# Patient Record
Sex: MALE | Race: WHITE | Employment: OTHER | URBAN - METROPOLITAN AREA
[De-identification: names, ages, dates, MRNs, and addresses within clinical notes are randomized per-mention and may not be internally consistent; named-entity substitution may affect disease eponyms.]

---

## 2017-01-01 ENCOUNTER — SNF/IP PROF CHARGE ONLY (OUTPATIENT)
Dept: HEMATOLOGY/ONCOLOGY | Facility: HOSPITAL | Age: 62
End: 2017-01-01

## 2017-01-01 ENCOUNTER — OFFICE VISIT (OUTPATIENT)
Dept: HEMATOLOGY/ONCOLOGY | Facility: HOSPITAL | Age: 62
End: 2017-01-01
Attending: SPECIALIST
Payer: MEDICARE

## 2017-01-01 ENCOUNTER — SOCIAL WORK SERVICES (OUTPATIENT)
Dept: HEMATOLOGY/ONCOLOGY | Facility: HOSPITAL | Age: 62
End: 2017-01-01

## 2017-01-01 ENCOUNTER — HOSPITAL ENCOUNTER (OUTPATIENT)
Dept: MRI IMAGING | Facility: HOSPITAL | Age: 62
Discharge: HOME | End: 2017-01-01
Attending: PHYSICIAN ASSISTANT
Payer: MEDICARE

## 2017-01-01 ENCOUNTER — OFFICE VISIT (OUTPATIENT)
Dept: NEPHROLOGY | Facility: CLINIC | Age: 62
End: 2017-01-01

## 2017-01-01 ENCOUNTER — TELEPHONE (OUTPATIENT)
Dept: HEMATOLOGY/ONCOLOGY | Facility: HOSPITAL | Age: 62
End: 2017-01-01

## 2017-01-01 ENCOUNTER — OFFICE VISIT (OUTPATIENT)
Dept: FAMILY MEDICINE CLINIC | Facility: CLINIC | Age: 62
End: 2017-01-01

## 2017-01-01 ENCOUNTER — APPOINTMENT (OUTPATIENT)
Dept: CT IMAGING | Facility: HOSPITAL | Age: 62
DRG: 870 | End: 2017-01-01
Attending: INTERNAL MEDICINE
Payer: MEDICARE

## 2017-01-01 ENCOUNTER — MED REC SCAN ONLY (OUTPATIENT)
Dept: FAMILY MEDICINE CLINIC | Facility: CLINIC | Age: 62
End: 2017-01-01

## 2017-01-01 ENCOUNTER — APPOINTMENT (OUTPATIENT)
Dept: LAB | Facility: HOSPITAL | Age: 62
End: 2017-01-01
Attending: SPECIALIST
Payer: MEDICARE

## 2017-01-01 ENCOUNTER — TELEPHONE (OUTPATIENT)
Dept: NEPHROLOGY | Facility: CLINIC | Age: 62
End: 2017-01-01

## 2017-01-01 ENCOUNTER — OFFICE VISIT (OUTPATIENT)
Dept: SURGERY | Facility: CLINIC | Age: 62
End: 2017-01-01

## 2017-01-01 ENCOUNTER — TELEPHONE (OUTPATIENT)
Dept: FAMILY MEDICINE CLINIC | Facility: CLINIC | Age: 62
End: 2017-01-01

## 2017-01-01 ENCOUNTER — APPOINTMENT (OUTPATIENT)
Dept: CV DIAGNOSTICS | Facility: HOSPITAL | Age: 62
DRG: 870 | End: 2017-01-01
Attending: INTERNAL MEDICINE
Payer: MEDICARE

## 2017-01-01 ENCOUNTER — HOSPITAL ENCOUNTER (OUTPATIENT)
Dept: GENERAL RADIOLOGY | Age: 62
Discharge: HOME | End: 2017-01-01
Attending: SPECIALIST
Payer: MEDICARE

## 2017-01-01 ENCOUNTER — HOSPITAL ENCOUNTER (INPATIENT)
Facility: HOSPITAL | Age: 62
LOS: 16 days | Discharge: HOME HEALTH | DRG: 870 | End: 2018-01-01
Attending: INTERNAL MEDICINE | Admitting: INTERNAL MEDICINE
Payer: MEDICARE

## 2017-01-01 ENCOUNTER — APPOINTMENT (OUTPATIENT)
Dept: GENERAL RADIOLOGY | Facility: HOSPITAL | Age: 62
DRG: 870 | End: 2017-01-01
Attending: INTERNAL MEDICINE
Payer: MEDICARE

## 2017-01-01 VITALS
BODY MASS INDEX: 22.48 KG/M2 | OXYGEN SATURATION: 98 % | WEIGHT: 151.81 LBS | DIASTOLIC BLOOD PRESSURE: 60 MMHG | HEART RATE: 89 BPM | TEMPERATURE: 98 F | RESPIRATION RATE: 18 BRPM | SYSTOLIC BLOOD PRESSURE: 106 MMHG | HEIGHT: 69.09 IN

## 2017-01-01 VITALS
WEIGHT: 175.63 LBS | DIASTOLIC BLOOD PRESSURE: 86 MMHG | BODY MASS INDEX: 26.62 KG/M2 | RESPIRATION RATE: 20 BRPM | HEART RATE: 83 BPM | SYSTOLIC BLOOD PRESSURE: 125 MMHG | HEIGHT: 68.27 IN | OXYGEN SATURATION: 95 % | TEMPERATURE: 97 F

## 2017-01-01 VITALS
WEIGHT: 161.81 LBS | HEIGHT: 68.27 IN | BODY MASS INDEX: 24.1 KG/M2 | SYSTOLIC BLOOD PRESSURE: 131 MMHG | HEIGHT: 68.27 IN | TEMPERATURE: 98 F | HEART RATE: 59 BPM | RESPIRATION RATE: 18 BRPM | BODY MASS INDEX: 24.52 KG/M2 | OXYGEN SATURATION: 98 % | TEMPERATURE: 99 F | DIASTOLIC BLOOD PRESSURE: 77 MMHG | OXYGEN SATURATION: 99 % | HEART RATE: 81 BPM | SYSTOLIC BLOOD PRESSURE: 129 MMHG | DIASTOLIC BLOOD PRESSURE: 77 MMHG | RESPIRATION RATE: 18 BRPM | WEIGHT: 159 LBS

## 2017-01-01 VITALS
BODY MASS INDEX: 23.85 KG/M2 | OXYGEN SATURATION: 99 % | HEART RATE: 99 BPM | TEMPERATURE: 97 F | SYSTOLIC BLOOD PRESSURE: 98 MMHG | HEIGHT: 68.27 IN | RESPIRATION RATE: 18 BRPM | DIASTOLIC BLOOD PRESSURE: 65 MMHG | WEIGHT: 157.38 LBS

## 2017-01-01 VITALS
WEIGHT: 149 LBS | HEART RATE: 90 BPM | TEMPERATURE: 98 F | OXYGEN SATURATION: 97 % | DIASTOLIC BLOOD PRESSURE: 60 MMHG | HEIGHT: 69.09 IN | RESPIRATION RATE: 18 BRPM | BODY MASS INDEX: 22.07 KG/M2 | SYSTOLIC BLOOD PRESSURE: 107 MMHG

## 2017-01-01 VITALS
DIASTOLIC BLOOD PRESSURE: 42 MMHG | RESPIRATION RATE: 18 BRPM | SYSTOLIC BLOOD PRESSURE: 116 MMHG | HEART RATE: 74 BPM | BODY MASS INDEX: 24.34 KG/M2 | HEIGHT: 68.27 IN | WEIGHT: 160.63 LBS | TEMPERATURE: 97 F

## 2017-01-01 VITALS
OXYGEN SATURATION: 98 % | BODY MASS INDEX: 22.16 KG/M2 | TEMPERATURE: 96 F | HEIGHT: 69.09 IN | WEIGHT: 149.63 LBS | SYSTOLIC BLOOD PRESSURE: 119 MMHG | DIASTOLIC BLOOD PRESSURE: 75 MMHG | RESPIRATION RATE: 18 BRPM | HEART RATE: 71 BPM

## 2017-01-01 VITALS
BODY MASS INDEX: 26.16 KG/M2 | TEMPERATURE: 97 F | SYSTOLIC BLOOD PRESSURE: 129 MMHG | WEIGHT: 158.38 LBS | HEART RATE: 92 BPM | HEART RATE: 78 BPM | DIASTOLIC BLOOD PRESSURE: 72 MMHG | OXYGEN SATURATION: 99 % | WEIGHT: 172.63 LBS | DIASTOLIC BLOOD PRESSURE: 79 MMHG | BODY MASS INDEX: 24 KG/M2 | RESPIRATION RATE: 18 BRPM | TEMPERATURE: 98 F | HEIGHT: 68.27 IN | SYSTOLIC BLOOD PRESSURE: 133 MMHG | RESPIRATION RATE: 18 BRPM | OXYGEN SATURATION: 98 % | HEIGHT: 68.27 IN

## 2017-01-01 VITALS
BODY MASS INDEX: 22.13 KG/M2 | HEIGHT: 69.09 IN | TEMPERATURE: 97 F | HEART RATE: 58 BPM | WEIGHT: 149.38 LBS | OXYGEN SATURATION: 98 % | RESPIRATION RATE: 16 BRPM | SYSTOLIC BLOOD PRESSURE: 112 MMHG | DIASTOLIC BLOOD PRESSURE: 75 MMHG

## 2017-01-01 VITALS
SYSTOLIC BLOOD PRESSURE: 119 MMHG | HEART RATE: 94 BPM | TEMPERATURE: 98 F | WEIGHT: 159.63 LBS | BODY MASS INDEX: 24 KG/M2 | DIASTOLIC BLOOD PRESSURE: 68 MMHG | OXYGEN SATURATION: 97 % | RESPIRATION RATE: 18 BRPM

## 2017-01-01 VITALS
OXYGEN SATURATION: 100 % | DIASTOLIC BLOOD PRESSURE: 54 MMHG | RESPIRATION RATE: 18 BRPM | SYSTOLIC BLOOD PRESSURE: 107 MMHG | TEMPERATURE: 98 F | HEART RATE: 98 BPM

## 2017-01-01 VITALS
DIASTOLIC BLOOD PRESSURE: 76 MMHG | SYSTOLIC BLOOD PRESSURE: 116 MMHG | RESPIRATION RATE: 18 BRPM | TEMPERATURE: 98 F | OXYGEN SATURATION: 100 % | HEIGHT: 68.27 IN | WEIGHT: 165.5 LBS | HEART RATE: 99 BPM | BODY MASS INDEX: 25.08 KG/M2

## 2017-01-01 VITALS
SYSTOLIC BLOOD PRESSURE: 117 MMHG | OXYGEN SATURATION: 98 % | DIASTOLIC BLOOD PRESSURE: 72 MMHG | BODY MASS INDEX: 25.1 KG/M2 | RESPIRATION RATE: 18 BRPM | HEART RATE: 96 BPM | TEMPERATURE: 98 F | HEIGHT: 68.27 IN | WEIGHT: 165.63 LBS

## 2017-01-01 VITALS
WEIGHT: 149 LBS | HEART RATE: 98 BPM | SYSTOLIC BLOOD PRESSURE: 131 MMHG | DIASTOLIC BLOOD PRESSURE: 67 MMHG | WEIGHT: 159 LBS | TEMPERATURE: 98 F | HEIGHT: 69.09 IN | BODY MASS INDEX: 23.55 KG/M2 | HEIGHT: 69.09 IN | OXYGEN SATURATION: 98 % | SYSTOLIC BLOOD PRESSURE: 107 MMHG | RESPIRATION RATE: 18 BRPM | RESPIRATION RATE: 18 BRPM | HEART RATE: 90 BPM | TEMPERATURE: 98 F | BODY MASS INDEX: 22.07 KG/M2 | OXYGEN SATURATION: 97 % | DIASTOLIC BLOOD PRESSURE: 60 MMHG

## 2017-01-01 VITALS
HEART RATE: 98 BPM | OXYGEN SATURATION: 96 % | RESPIRATION RATE: 18 BRPM | TEMPERATURE: 97 F | HEIGHT: 69.09 IN | DIASTOLIC BLOOD PRESSURE: 62 MMHG | SYSTOLIC BLOOD PRESSURE: 121 MMHG | BODY MASS INDEX: 23.47 KG/M2 | WEIGHT: 158.5 LBS

## 2017-01-01 VITALS
HEART RATE: 107 BPM | OXYGEN SATURATION: 99 % | SYSTOLIC BLOOD PRESSURE: 129 MMHG | HEIGHT: 69.09 IN | DIASTOLIC BLOOD PRESSURE: 75 MMHG | RESPIRATION RATE: 18 BRPM | BODY MASS INDEX: 22.81 KG/M2 | TEMPERATURE: 98 F | WEIGHT: 154 LBS

## 2017-01-01 VITALS
DIASTOLIC BLOOD PRESSURE: 76 MMHG | HEIGHT: 69.09 IN | WEIGHT: 145.38 LBS | TEMPERATURE: 96 F | RESPIRATION RATE: 18 BRPM | BODY MASS INDEX: 21.53 KG/M2 | OXYGEN SATURATION: 98 % | HEART RATE: 82 BPM | SYSTOLIC BLOOD PRESSURE: 134 MMHG

## 2017-01-01 VITALS
DIASTOLIC BLOOD PRESSURE: 74 MMHG | HEART RATE: 80 BPM | TEMPERATURE: 98 F | RESPIRATION RATE: 18 BRPM | SYSTOLIC BLOOD PRESSURE: 109 MMHG

## 2017-01-01 VITALS — WEIGHT: 148 LBS | SYSTOLIC BLOOD PRESSURE: 122 MMHG | BODY MASS INDEX: 22 KG/M2 | DIASTOLIC BLOOD PRESSURE: 74 MMHG

## 2017-01-01 VITALS
WEIGHT: 169 LBS | DIASTOLIC BLOOD PRESSURE: 80 MMHG | HEART RATE: 99 BPM | HEIGHT: 68.27 IN | RESPIRATION RATE: 18 BRPM | BODY MASS INDEX: 25.61 KG/M2 | SYSTOLIC BLOOD PRESSURE: 128 MMHG | TEMPERATURE: 98 F | OXYGEN SATURATION: 97 %

## 2017-01-01 VITALS
RESPIRATION RATE: 18 BRPM | DIASTOLIC BLOOD PRESSURE: 64 MMHG | OXYGEN SATURATION: 100 % | BODY MASS INDEX: 24.25 KG/M2 | WEIGHT: 160 LBS | SYSTOLIC BLOOD PRESSURE: 114 MMHG | TEMPERATURE: 97 F | HEART RATE: 92 BPM | HEIGHT: 68.27 IN

## 2017-01-01 VITALS
WEIGHT: 149.38 LBS | OXYGEN SATURATION: 98 % | RESPIRATION RATE: 20 BRPM | BODY MASS INDEX: 22 KG/M2 | SYSTOLIC BLOOD PRESSURE: 122 MMHG | TEMPERATURE: 97 F | HEART RATE: 76 BPM | DIASTOLIC BLOOD PRESSURE: 55 MMHG

## 2017-01-01 VITALS
RESPIRATION RATE: 20 BRPM | WEIGHT: 173.19 LBS | OXYGEN SATURATION: 99 % | TEMPERATURE: 98 F | DIASTOLIC BLOOD PRESSURE: 77 MMHG | HEART RATE: 97 BPM | SYSTOLIC BLOOD PRESSURE: 127 MMHG | BODY MASS INDEX: 26.25 KG/M2 | HEIGHT: 68.27 IN

## 2017-01-01 VITALS
WEIGHT: 170 LBS | HEART RATE: 101 BPM | DIASTOLIC BLOOD PRESSURE: 75 MMHG | HEIGHT: 68.27 IN | RESPIRATION RATE: 18 BRPM | SYSTOLIC BLOOD PRESSURE: 120 MMHG | OXYGEN SATURATION: 98 % | TEMPERATURE: 98 F | BODY MASS INDEX: 25.76 KG/M2

## 2017-01-01 VITALS
BODY MASS INDEX: 23.6 KG/M2 | HEART RATE: 80 BPM | RESPIRATION RATE: 18 BRPM | SYSTOLIC BLOOD PRESSURE: 129 MMHG | HEIGHT: 69.09 IN | SYSTOLIC BLOOD PRESSURE: 123 MMHG | WEIGHT: 159.38 LBS | WEIGHT: 170.38 LBS | TEMPERATURE: 98 F | DIASTOLIC BLOOD PRESSURE: 50 MMHG | OXYGEN SATURATION: 98 % | OXYGEN SATURATION: 98 % | BODY MASS INDEX: 25.82 KG/M2 | HEIGHT: 68.27 IN | RESPIRATION RATE: 18 BRPM | DIASTOLIC BLOOD PRESSURE: 83 MMHG | TEMPERATURE: 98 F | HEART RATE: 101 BPM

## 2017-01-01 VITALS
OXYGEN SATURATION: 99 % | TEMPERATURE: 97 F | SYSTOLIC BLOOD PRESSURE: 148 MMHG | DIASTOLIC BLOOD PRESSURE: 75 MMHG | HEART RATE: 63 BPM | RESPIRATION RATE: 18 BRPM

## 2017-01-01 VITALS
DIASTOLIC BLOOD PRESSURE: 75 MMHG | WEIGHT: 159.19 LBS | HEART RATE: 85 BPM | SYSTOLIC BLOOD PRESSURE: 125 MMHG | BODY MASS INDEX: 24.13 KG/M2 | TEMPERATURE: 98 F | OXYGEN SATURATION: 97 % | HEIGHT: 68.27 IN | RESPIRATION RATE: 18 BRPM

## 2017-01-01 VITALS
DIASTOLIC BLOOD PRESSURE: 78 MMHG | TEMPERATURE: 98 F | BODY MASS INDEX: 21.89 KG/M2 | OXYGEN SATURATION: 99 % | HEART RATE: 84 BPM | WEIGHT: 147.81 LBS | HEIGHT: 69.09 IN | RESPIRATION RATE: 18 BRPM | SYSTOLIC BLOOD PRESSURE: 131 MMHG

## 2017-01-01 VITALS
RESPIRATION RATE: 18 BRPM | TEMPERATURE: 98 F | DIASTOLIC BLOOD PRESSURE: 69 MMHG | BODY MASS INDEX: 24.38 KG/M2 | WEIGHT: 164.63 LBS | HEART RATE: 88 BPM | SYSTOLIC BLOOD PRESSURE: 120 MMHG | OXYGEN SATURATION: 100 % | HEIGHT: 69.09 IN

## 2017-01-01 VITALS
TEMPERATURE: 96 F | RESPIRATION RATE: 18 BRPM | WEIGHT: 171 LBS | DIASTOLIC BLOOD PRESSURE: 71 MMHG | SYSTOLIC BLOOD PRESSURE: 125 MMHG | BODY MASS INDEX: 25.91 KG/M2 | HEIGHT: 68.27 IN | HEART RATE: 77 BPM

## 2017-01-01 VITALS
TEMPERATURE: 98 F | OXYGEN SATURATION: 99 % | BODY MASS INDEX: 24.02 KG/M2 | RESPIRATION RATE: 18 BRPM | SYSTOLIC BLOOD PRESSURE: 112 MMHG | DIASTOLIC BLOOD PRESSURE: 69 MMHG | HEIGHT: 68.27 IN | HEART RATE: 80 BPM | WEIGHT: 158.5 LBS

## 2017-01-01 VITALS
HEIGHT: 69.09 IN | WEIGHT: 160.19 LBS | BODY MASS INDEX: 23.73 KG/M2 | HEART RATE: 88 BPM | SYSTOLIC BLOOD PRESSURE: 113 MMHG | OXYGEN SATURATION: 98 % | TEMPERATURE: 98 F | DIASTOLIC BLOOD PRESSURE: 67 MMHG | RESPIRATION RATE: 18 BRPM

## 2017-01-01 VITALS
HEART RATE: 102 BPM | WEIGHT: 154 LBS | DIASTOLIC BLOOD PRESSURE: 68 MMHG | RESPIRATION RATE: 18 BRPM | HEIGHT: 69.09 IN | OXYGEN SATURATION: 98 % | SYSTOLIC BLOOD PRESSURE: 122 MMHG | BODY MASS INDEX: 22.81 KG/M2 | TEMPERATURE: 99 F

## 2017-01-01 VITALS
HEIGHT: 69.09 IN | OXYGEN SATURATION: 98 % | DIASTOLIC BLOOD PRESSURE: 62 MMHG | SYSTOLIC BLOOD PRESSURE: 108 MMHG | RESPIRATION RATE: 18 BRPM | HEART RATE: 105 BPM | WEIGHT: 146 LBS | BODY MASS INDEX: 21.62 KG/M2 | TEMPERATURE: 97 F

## 2017-01-01 VITALS
HEIGHT: 67 IN | SYSTOLIC BLOOD PRESSURE: 110 MMHG | HEART RATE: 79 BPM | OXYGEN SATURATION: 97 % | DIASTOLIC BLOOD PRESSURE: 62 MMHG | WEIGHT: 158.13 LBS | TEMPERATURE: 98 F | BODY MASS INDEX: 24.82 KG/M2

## 2017-01-01 VITALS
OXYGEN SATURATION: 99 % | DIASTOLIC BLOOD PRESSURE: 60 MMHG | WEIGHT: 151.25 LBS | TEMPERATURE: 99 F | HEART RATE: 101 BPM | BODY MASS INDEX: 22.92 KG/M2 | SYSTOLIC BLOOD PRESSURE: 110 MMHG | HEIGHT: 68 IN

## 2017-01-01 VITALS
RESPIRATION RATE: 19 BRPM | BODY MASS INDEX: 24.95 KG/M2 | HEART RATE: 88 BPM | WEIGHT: 164.63 LBS | TEMPERATURE: 97 F | DIASTOLIC BLOOD PRESSURE: 60 MMHG | SYSTOLIC BLOOD PRESSURE: 106 MMHG | HEIGHT: 68.27 IN

## 2017-01-01 VITALS
TEMPERATURE: 99 F | DIASTOLIC BLOOD PRESSURE: 73 MMHG | RESPIRATION RATE: 18 BRPM | HEART RATE: 101 BPM | BODY MASS INDEX: 23.99 KG/M2 | SYSTOLIC BLOOD PRESSURE: 135 MMHG | WEIGHT: 162 LBS | HEIGHT: 69 IN

## 2017-01-01 VITALS
HEIGHT: 68.27 IN | SYSTOLIC BLOOD PRESSURE: 117 MMHG | OXYGEN SATURATION: 98 % | HEART RATE: 68 BPM | TEMPERATURE: 99 F | WEIGHT: 160.38 LBS | BODY MASS INDEX: 24.31 KG/M2 | RESPIRATION RATE: 18 BRPM | DIASTOLIC BLOOD PRESSURE: 65 MMHG

## 2017-01-01 VITALS
TEMPERATURE: 97 F | WEIGHT: 157.81 LBS | BODY MASS INDEX: 23.37 KG/M2 | SYSTOLIC BLOOD PRESSURE: 112 MMHG | OXYGEN SATURATION: 99 % | HEART RATE: 90 BPM | HEIGHT: 69.09 IN | RESPIRATION RATE: 18 BRPM | DIASTOLIC BLOOD PRESSURE: 70 MMHG

## 2017-01-01 VITALS
BODY MASS INDEX: 22.54 KG/M2 | WEIGHT: 152.19 LBS | HEART RATE: 77 BPM | SYSTOLIC BLOOD PRESSURE: 120 MMHG | OXYGEN SATURATION: 98 % | RESPIRATION RATE: 18 BRPM | TEMPERATURE: 97 F | HEIGHT: 69.09 IN | DIASTOLIC BLOOD PRESSURE: 71 MMHG

## 2017-01-01 VITALS
BODY MASS INDEX: 24 KG/M2 | SYSTOLIC BLOOD PRESSURE: 128 MMHG | WEIGHT: 159.63 LBS | RESPIRATION RATE: 18 BRPM | DIASTOLIC BLOOD PRESSURE: 72 MMHG

## 2017-01-01 VITALS
WEIGHT: 159.63 LBS | BODY MASS INDEX: 24.19 KG/M2 | SYSTOLIC BLOOD PRESSURE: 140 MMHG | HEIGHT: 68.27 IN | DIASTOLIC BLOOD PRESSURE: 85 MMHG | TEMPERATURE: 99 F | HEART RATE: 88 BPM | OXYGEN SATURATION: 98 % | RESPIRATION RATE: 18 BRPM

## 2017-01-01 VITALS
HEART RATE: 84 BPM | RESPIRATION RATE: 18 BRPM | SYSTOLIC BLOOD PRESSURE: 114 MMHG | OXYGEN SATURATION: 98 % | TEMPERATURE: 97 F | DIASTOLIC BLOOD PRESSURE: 67 MMHG | HEIGHT: 69.09 IN | BODY MASS INDEX: 23.99 KG/M2 | WEIGHT: 162 LBS

## 2017-01-01 VITALS
DIASTOLIC BLOOD PRESSURE: 70 MMHG | BODY MASS INDEX: 23.18 KG/M2 | SYSTOLIC BLOOD PRESSURE: 122 MMHG | HEART RATE: 96 BPM | OXYGEN SATURATION: 98 % | HEIGHT: 69.09 IN | WEIGHT: 156.5 LBS | RESPIRATION RATE: 18 BRPM | TEMPERATURE: 98 F

## 2017-01-01 VITALS
HEART RATE: 85 BPM | RESPIRATION RATE: 18 BRPM | TEMPERATURE: 97 F | WEIGHT: 156 LBS | OXYGEN SATURATION: 99 % | DIASTOLIC BLOOD PRESSURE: 85 MMHG | SYSTOLIC BLOOD PRESSURE: 120 MMHG | BODY MASS INDEX: 23.11 KG/M2 | HEIGHT: 69.09 IN

## 2017-01-01 VITALS — WEIGHT: 154 LBS | BODY MASS INDEX: 23 KG/M2 | DIASTOLIC BLOOD PRESSURE: 70 MMHG | SYSTOLIC BLOOD PRESSURE: 122 MMHG

## 2017-01-01 VITALS — HEIGHT: 69.09 IN | BODY MASS INDEX: 23.64 KG/M2 | WEIGHT: 159.63 LBS

## 2017-01-01 VITALS
WEIGHT: 162.81 LBS | DIASTOLIC BLOOD PRESSURE: 61 MMHG | OXYGEN SATURATION: 97 % | SYSTOLIC BLOOD PRESSURE: 100 MMHG | HEART RATE: 93 BPM | BODY MASS INDEX: 24.68 KG/M2 | TEMPERATURE: 98 F | RESPIRATION RATE: 18 BRPM | HEIGHT: 68.27 IN

## 2017-01-01 VITALS
HEART RATE: 89 BPM | RESPIRATION RATE: 18 BRPM | TEMPERATURE: 97 F | SYSTOLIC BLOOD PRESSURE: 138 MMHG | DIASTOLIC BLOOD PRESSURE: 79 MMHG

## 2017-01-01 DIAGNOSIS — N18.4 CKD (CHRONIC KIDNEY DISEASE) STAGE 4, GFR 15-29 ML/MIN (HCC): ICD-10-CM

## 2017-01-01 DIAGNOSIS — Z87.81 HISTORY OF COMPRESSION FRACTURE OF SPINE: Primary | ICD-10-CM

## 2017-01-01 DIAGNOSIS — D63.1 ANEMIA IN STAGE 4 CHRONIC KIDNEY DISEASE (HCC): ICD-10-CM

## 2017-01-01 DIAGNOSIS — C90.00 STAGE III MULTIPLE MYELOMA (HCC): ICD-10-CM

## 2017-01-01 DIAGNOSIS — D89.89 LIGHT CHAIN DISEASE, LAMBDA TYPE (HCC): Primary | ICD-10-CM

## 2017-01-01 DIAGNOSIS — N17.0 ACUTE RENAL FAILURE WITH ACUTE TUBULAR NECROSIS SUPERIMPOSED ON STAGE 4 CHRONIC KIDNEY DISEASE (HCC): ICD-10-CM

## 2017-01-01 DIAGNOSIS — D89.89 LIGHT CHAIN DISEASE, LAMBDA TYPE (HCC): ICD-10-CM

## 2017-01-01 DIAGNOSIS — C90.02 MULTIPLE MYELOMA IN RELAPSE (HCC): Primary | ICD-10-CM

## 2017-01-01 DIAGNOSIS — B18.1 CHRONIC VIRAL HEPATITIS B WITHOUT DELTA AGENT AND WITHOUT COMA (HCC): ICD-10-CM

## 2017-01-01 DIAGNOSIS — K21.9 GERD WITHOUT ESOPHAGITIS: ICD-10-CM

## 2017-01-01 DIAGNOSIS — N18.4 ANEMIA IN STAGE 4 CHRONIC KIDNEY DISEASE (HCC): Primary | ICD-10-CM

## 2017-01-01 DIAGNOSIS — E79.0 HYPERURICEMIA: ICD-10-CM

## 2017-01-01 DIAGNOSIS — N18.4 STAGE 4 CHRONIC KIDNEY DISEASE (HCC): Primary | ICD-10-CM

## 2017-01-01 DIAGNOSIS — R53.83 FATIGUE DUE TO TREATMENT: ICD-10-CM

## 2017-01-01 DIAGNOSIS — N08 MYELOMA KIDNEY (HCC): ICD-10-CM

## 2017-01-01 DIAGNOSIS — D63.1 ANEMIA IN STAGE 3 CHRONIC KIDNEY DISEASE (HCC): ICD-10-CM

## 2017-01-01 DIAGNOSIS — Z00.00 ENCOUNTER FOR ANNUAL HEALTH EXAMINATION: ICD-10-CM

## 2017-01-01 DIAGNOSIS — D64.9 NORMOCYTIC NORMOCHROMIC ANEMIA: ICD-10-CM

## 2017-01-01 DIAGNOSIS — C90.02 MULTIPLE MYELOMA IN RELAPSE (HCC): ICD-10-CM

## 2017-01-01 DIAGNOSIS — N18.30 ANEMIA IN STAGE 3 CHRONIC KIDNEY DISEASE (HCC): ICD-10-CM

## 2017-01-01 DIAGNOSIS — B19.10 REACTIVATION OF HEPATITIS B VIRAL HEPATITIS: ICD-10-CM

## 2017-01-01 DIAGNOSIS — C90.00 MYELOMA KIDNEY (HCC): ICD-10-CM

## 2017-01-01 DIAGNOSIS — N18.4 ANEMIA IN STAGE 4 CHRONIC KIDNEY DISEASE (HCC): ICD-10-CM

## 2017-01-01 DIAGNOSIS — N18.4 CKD (CHRONIC KIDNEY DISEASE) STAGE 4, GFR 15-29 ML/MIN (HCC): Primary | ICD-10-CM

## 2017-01-01 DIAGNOSIS — N18.30 CHRONIC RENAL IMPAIRMENT, STAGE 3 (MODERATE) (HCC): ICD-10-CM

## 2017-01-01 DIAGNOSIS — M89.9 DISORDER OF BONE: ICD-10-CM

## 2017-01-01 DIAGNOSIS — N08 MYELOMA KIDNEY (HCC): Primary | ICD-10-CM

## 2017-01-01 DIAGNOSIS — Z87.81 HISTORY OF COMPRESSION FRACTURE OF SPINE: ICD-10-CM

## 2017-01-01 DIAGNOSIS — D69.6 THROMBOCYTOPENIA (HCC): ICD-10-CM

## 2017-01-01 DIAGNOSIS — N18.30 CHRONIC RENAL INSUFFICIENCY, STAGE 3 (MODERATE) (HCC): Primary | ICD-10-CM

## 2017-01-01 DIAGNOSIS — N18.30 CHRONIC RENAL INSUFFICIENCY, STAGE 3 (MODERATE) (HCC): ICD-10-CM

## 2017-01-01 DIAGNOSIS — R63.4 WEIGHT LOSS: ICD-10-CM

## 2017-01-01 DIAGNOSIS — N18.4 ACUTE RENAL FAILURE WITH ACUTE TUBULAR NECROSIS SUPERIMPOSED ON STAGE 4 CHRONIC KIDNEY DISEASE (HCC): ICD-10-CM

## 2017-01-01 DIAGNOSIS — D63.1 ANEMIA IN STAGE 4 CHRONIC KIDNEY DISEASE (HCC): Primary | ICD-10-CM

## 2017-01-01 DIAGNOSIS — C90.00 MYELOMA KIDNEY (HCC): Primary | ICD-10-CM

## 2017-01-01 DIAGNOSIS — Z71.89 OTHER SPECIFIED COUNSELING: Primary | ICD-10-CM

## 2017-01-01 DIAGNOSIS — C90.00 STAGE III MULTIPLE MYELOMA (HCC): Primary | ICD-10-CM

## 2017-01-01 DIAGNOSIS — H25.011 CORTICAL AGE-RELATED CATARACT OF RIGHT EYE: ICD-10-CM

## 2017-01-01 DIAGNOSIS — N18.4 STAGE 4 CHRONIC KIDNEY DISEASE (HCC): ICD-10-CM

## 2017-01-01 DIAGNOSIS — R63.4 WEIGHT LOSS: Primary | ICD-10-CM

## 2017-01-01 DIAGNOSIS — B18.1 CHRONIC VIRAL HEPATITIS B WITHOUT DELTA AGENT AND WITHOUT COMA (HCC): Primary | ICD-10-CM

## 2017-01-01 DIAGNOSIS — Z94.84 HX OF AUTOLOGOUS STEM CELL TRANSPLANT (HCC): ICD-10-CM

## 2017-01-01 DIAGNOSIS — N18.30 ACUTE RENAL FAILURE WITH OTHER SPECIFIED PATHOLOGICAL KIDNEY LESION SUPERIMPOSED ON STAGE 3 CHRONIC KIDNEY DISEASE: ICD-10-CM

## 2017-01-01 DIAGNOSIS — N17.8 ACUTE RENAL FAILURE WITH OTHER SPECIFIED PATHOLOGICAL KIDNEY LESION SUPERIMPOSED ON STAGE 3 CHRONIC KIDNEY DISEASE: ICD-10-CM

## 2017-01-01 DIAGNOSIS — Z01.818 PRE-OP EVALUATION: Primary | ICD-10-CM

## 2017-01-01 DIAGNOSIS — B19.10 HEP B W/O COMA: Primary | ICD-10-CM

## 2017-01-01 DIAGNOSIS — F32.89 OTHER DEPRESSION: ICD-10-CM

## 2017-01-01 DIAGNOSIS — B19.10 REACTIVATION OF HEPATITIS B VIRAL HEPATITIS: Primary | ICD-10-CM

## 2017-01-01 DIAGNOSIS — R79.89 ELEVATED LFTS: ICD-10-CM

## 2017-01-01 DIAGNOSIS — D64.9 ANEMIA, UNSPECIFIED TYPE: ICD-10-CM

## 2017-01-01 DIAGNOSIS — D53.9 MACROCYTIC ANEMIA: ICD-10-CM

## 2017-01-01 DIAGNOSIS — H65.01 RIGHT ACUTE SEROUS OTITIS MEDIA, RECURRENCE NOT SPECIFIED: ICD-10-CM

## 2017-01-01 DIAGNOSIS — M89.9 DISORDER OF BONE: Primary | ICD-10-CM

## 2017-01-01 LAB
A/G RATIO: 1.42
A/G RATIO: 1.43
ADENOVIRUS PCR:: NEGATIVE
ALBUMIN SERPL-MCNC: 1.8 G/DL (ref 3.5–4.8)
ALBUMIN SERPL-MCNC: 2.1 G/DL (ref 3.5–4.8)
ALBUMIN SERPL-MCNC: 3 G/DL (ref 3.5–4.8)
ALBUMIN SERPL-MCNC: 3.1 G/DL (ref 3.5–4.8)
ALBUMIN SERPL-MCNC: 3.1 G/DL (ref 3.5–4.8)
ALBUMIN SERPL-MCNC: 3.3 G/DL (ref 3.5–4.8)
ALBUMIN SERPL-MCNC: 3.3 G/DL (ref 3.5–4.8)
ALBUMIN, SERUM: 3.7 G/DL (ref 3.5–4.8)
ALBUMIN, SERUM: 3.94 G/DL (ref 3.5–4.8)
ALLENS TEST: POSITIVE
ALP LIVER SERPL-CCNC: 102 U/L (ref 45–117)
ALP LIVER SERPL-CCNC: 77 U/L (ref 45–117)
ALP LIVER SERPL-CCNC: 83 U/L (ref 45–117)
ALP LIVER SERPL-CCNC: 84 U/L (ref 45–117)
ALP LIVER SERPL-CCNC: 85 U/L (ref 45–117)
ALP LIVER SERPL-CCNC: 87 U/L (ref 45–117)
ALP LIVER SERPL-CCNC: 88 U/L (ref 45–117)
ALP LIVER SERPL-CCNC: 92 U/L (ref 45–117)
ALP LIVER SERPL-CCNC: 97 U/L (ref 45–117)
ALP LIVER SERPL-CCNC: 99 U/L (ref 45–117)
ALPHA-1 GLOBULIN: 0.23 G/DL (ref 0.1–0.3)
ALPHA-1 GLOBULIN: 0.26 G/DL (ref 0.1–0.3)
ALPHA-2 GLOBULIN: 1.27 G/DL (ref 0.6–1)
ALPHA-2 GLOBULIN: 1.61 G/DL (ref 0.6–1)
ALT SERPL-CCNC: 20 U/L (ref 17–63)
ALT SERPL-CCNC: 22 U/L (ref 17–63)
ALT SERPL-CCNC: 27 U/L (ref 17–63)
ALT SERPL-CCNC: 28 U/L (ref 17–63)
ALT SERPL-CCNC: 32 U/L (ref 17–63)
ALT SERPL-CCNC: 33 U/L (ref 17–63)
ALT SERPL-CCNC: 35 U/L (ref 17–63)
ALT SERPL-CCNC: 36 U/L (ref 17–63)
ALT SERPL-CCNC: 37 U/L (ref 17–63)
ALT SERPL-CCNC: 48 U/L (ref 17–63)
ANTIBODY SCREEN: NEGATIVE
ANTIBODY SCREEN: POSITIVE
APTT PPP: 34.1 SECONDS (ref 25–34)
ARTERIAL BLD GAS O2 SATURATION: 93 % (ref 92–100)
ARTERIAL BLD GAS O2 SATURATION: 94 % (ref 92–100)
ARTERIAL BLD GAS O2 SATURATION: 97 % (ref 92–100)
ARTERIAL BLOOD GAS BASE EXCESS: -10.7
ARTERIAL BLOOD GAS BASE EXCESS: -11.8
ARTERIAL BLOOD GAS BASE EXCESS: -12.3
ARTERIAL BLOOD GAS BASE EXCESS: -12.7
ARTERIAL BLOOD GAS BASE EXCESS: -13.1
ARTERIAL BLOOD GAS HCO3: 14.2 MEQ/L (ref 22–26)
ARTERIAL BLOOD GAS HCO3: 14.3 MEQ/L (ref 22–26)
ARTERIAL BLOOD GAS HCO3: 15.5 MEQ/L (ref 22–26)
ARTERIAL BLOOD GAS HCO3: 16.3 MEQ/L (ref 22–26)
ARTERIAL BLOOD GAS HCO3: 17.3 MEQ/L (ref 22–26)
ARTERIAL BLOOD GAS PCO2: 28 MM HG (ref 35–45)
ARTERIAL BLOOD GAS PCO2: 35 MM HG (ref 35–45)
ARTERIAL BLOOD GAS PCO2: 47 MM HG (ref 35–45)
ARTERIAL BLOOD GAS PCO2: 49 MM HG (ref 35–45)
ARTERIAL BLOOD GAS PCO2: 54 MM HG (ref 35–45)
ARTERIAL BLOOD GAS PH: 7.12 (ref 7.35–7.45)
ARTERIAL BLOOD GAS PH: 7.13 (ref 7.35–7.45)
ARTERIAL BLOOD GAS PH: 7.13 (ref 7.35–7.45)
ARTERIAL BLOOD GAS PH: 7.22 (ref 7.35–7.45)
ARTERIAL BLOOD GAS PH: 7.32 (ref 7.35–7.45)
ARTERIAL BLOOD GAS PO2: 177 MM HG (ref 80–105)
ARTERIAL BLOOD GAS PO2: 83 MM HG (ref 80–105)
ARTERIAL BLOOD GAS PO2: 93 MM HG (ref 80–105)
ARTERIAL BLOOD GAS PO2: 93 MM HG (ref 80–105)
ARTERIAL BLOOD GAS PO2: 97 MM HG (ref 80–105)
AST SERPL-CCNC: 15 U/L (ref 15–41)
AST SERPL-CCNC: 19 U/L (ref 15–41)
AST SERPL-CCNC: 22 U/L (ref 15–41)
AST SERPL-CCNC: 24 U/L (ref 15–41)
AST SERPL-CCNC: 25 U/L (ref 15–41)
AST SERPL-CCNC: 26 U/L (ref 15–41)
AST SERPL-CCNC: 28 U/L (ref 15–41)
AST SERPL-CCNC: 34 U/L (ref 15–41)
AST SERPL-CCNC: 35 U/L (ref 15–41)
AST SERPL-CCNC: 66 U/L (ref 15–41)
B PERT DNA SPEC QL NAA+PROBE: NEGATIVE
BAND %: 20 %
BASOPHIL % MANUAL: 0 %
BASOPHIL ABSOLUTE MANUAL: 0 X10(3) UL (ref 0–0.1)
BASOPHILS # BLD AUTO: 0 X10(3) UL (ref 0–0.1)
BASOPHILS # BLD AUTO: 0.01 X10(3) UL (ref 0–0.1)
BASOPHILS # BLD AUTO: 0.02 X10(3) UL (ref 0–0.1)
BASOPHILS # BLD AUTO: 0.02 X10(3) UL (ref 0–0.1)
BASOPHILS # BLD AUTO: 0.05 X10(3) UL (ref 0–0.1)
BASOPHILS NFR BLD AUTO: 0 %
BASOPHILS NFR BLD AUTO: 0.1 %
BASOPHILS NFR BLD AUTO: 0.2 %
BASOPHILS NFR BLD AUTO: 0.2 %
BASOPHILS NFR BLD AUTO: 0.3 %
BASOPHILS NFR BLD AUTO: 0.4 %
BASOPHILS NFR BLD AUTO: 0.6 %
BETA GLOBULIN: 0.44 G/DL (ref 0.7–1.2)
BETA GLOBULIN: 0.52 G/DL (ref 0.7–1.2)
BILIRUB SERPL-MCNC: 0.3 MG/DL (ref 0.1–2)
BILIRUB SERPL-MCNC: 0.4 MG/DL (ref 0.1–2)
BILIRUB SERPL-MCNC: 0.4 MG/DL (ref 0.1–2)
BILIRUB SERPL-MCNC: 0.5 MG/DL (ref 0.1–2)
BILIRUB SERPL-MCNC: 0.5 MG/DL (ref 0.1–2)
BILIRUB SERPL-MCNC: 0.8 MG/DL (ref 0.1–2)
BILIRUB SERPL-MCNC: 0.9 MG/DL (ref 0.1–2)
BILIRUB SERPL-MCNC: 0.9 MG/DL (ref 0.1–2)
BILIRUB SERPL-MCNC: 1 MG/DL (ref 0.1–2)
BILIRUB SERPL-MCNC: 1.2 MG/DL (ref 0.1–2)
BILIRUB UR QL STRIP.AUTO: NEGATIVE
BUN BLD-MCNC: 21 MG/DL (ref 8–20)
BUN BLD-MCNC: 23 MG/DL (ref 8–20)
BUN BLD-MCNC: 24 MG/DL (ref 8–20)
BUN BLD-MCNC: 25 MG/DL (ref 8–20)
BUN BLD-MCNC: 28 MG/DL (ref 8–20)
BUN BLD-MCNC: 29 MG/DL (ref 8–20)
BUN BLD-MCNC: 31 MG/DL (ref 8–20)
BUN BLD-MCNC: 36 MG/DL (ref 8–20)
BUN BLD-MCNC: 39 MG/DL (ref 8–20)
BUN BLD-MCNC: 41 MG/DL (ref 8–20)
BUN BLD-MCNC: 48 MG/DL (ref 8–20)
C PNEUM DNA SPEC QL NAA+PROBE: NEGATIVE
C-REACTIVE PROTEIN: 0.69 MG/DL (ref ?–1)
CALCIUM BLD-MCNC: 5.6 MG/DL (ref 8.3–10.3)
CALCIUM BLD-MCNC: 6 MG/DL (ref 8.3–10.3)
CALCIUM BLD-MCNC: 7.3 MG/DL (ref 8.3–10.3)
CALCIUM BLD-MCNC: 8.4 MG/DL (ref 8.3–10.3)
CALCIUM BLD-MCNC: 8.6 MG/DL (ref 8.3–10.3)
CALCIUM BLD-MCNC: 8.8 MG/DL (ref 8.3–10.3)
CALCIUM BLD-MCNC: 8.9 MG/DL (ref 8.3–10.3)
CALCIUM BLD-MCNC: 9 MG/DL (ref 8.3–10.3)
CALCIUM BLD-MCNC: 9 MG/DL (ref 8.3–10.3)
CALCIUM BLD-MCNC: 9.3 MG/DL (ref 8.3–10.3)
CALCIUM BLD-MCNC: 9.4 MG/DL (ref 8.3–10.3)
CALCULATED O2 SATURATION: 93 % (ref 92–100)
CALCULATED O2 SATURATION: 94 % (ref 92–100)
CALCULATED O2 SATURATION: 99 % (ref 92–100)
CARBOXYHEMOGLOBIN: 0.9 % SAT (ref 0–3)
CARBOXYHEMOGLOBIN: 1 % SAT (ref 0–3)
CARBOXYHEMOGLOBIN: 1 % SAT (ref 0–3)
CARBOXYHEMOGLOBIN: 1.1 % SAT (ref 0–3)
CARBOXYHEMOGLOBIN: 1.1 % SAT (ref 0–3)
CD138+: 99.5 %
CD19+CD10+: 1.4 %
CD19+CD20+: 98.6 %
CD19+CD22+: 96.5 %
CD19+CD25+: 0.9 %
CD19+CD5+: 0.1 %
CD19+KAPPA+: 63.8 %
CD19+LAMBDA+: 34.8 %
CD3+CD2+: 99.9 %
CD3+CD4+: 37.3 %
CD3+CD4+CD8+: 0.2 %
CD3+CD5+: 95.1 %
CD3+CD7+: 87.3 %
CD3+CD8+: 50.8 %
CD4+:CD8+ RATIO: 0.7
CD56+: 0.5 %
CHLORIDE: 107 MMOL/L (ref 101–111)
CHLORIDE: 108 MMOL/L (ref 101–111)
CHLORIDE: 111 MMOL/L (ref 101–111)
CHLORIDE: 112 MMOL/L (ref 101–111)
CHLORIDE: 112 MMOL/L (ref 101–111)
CHLORIDE: 114 MMOL/L (ref 101–111)
CHLORIDE: 115 MMOL/L (ref 101–111)
CHLORIDE: 116 MMOL/L (ref 101–111)
CHLORIDE: 119 MMOL/L (ref 101–111)
CLARITY UR REFRACT.AUTO: CLEAR
CO2: 14 MMOL/L (ref 22–32)
CO2: 17 MMOL/L (ref 22–32)
CO2: 17 MMOL/L (ref 22–32)
CO2: 18 MMOL/L (ref 22–32)
CO2: 18 MMOL/L (ref 22–32)
CO2: 19 MMOL/L (ref 22–32)
CO2: 20 MMOL/L (ref 22–32)
CO2: 20 MMOL/L (ref 22–32)
CO2: 21 MMOL/L (ref 22–32)
CORONAVIRUS 229E PCR:: NEGATIVE
CORONAVIRUS HKU1 PCR:: NEGATIVE
CORONAVIRUS NL63 PCR:: NEGATIVE
CORONAVIRUS OC43 PCR:: POSITIVE
CREAT BLD-MCNC: 1.63 MG/DL (ref 0.7–1.3)
CREAT BLD-MCNC: 1.65 MG/DL (ref 0.7–1.3)
CREAT BLD-MCNC: 1.68 MG/DL (ref 0.7–1.3)
CREAT BLD-MCNC: 1.76 MG/DL (ref 0.7–1.3)
CREAT BLD-MCNC: 1.81 MG/DL (ref 0.7–1.3)
CREAT BLD-MCNC: 1.84 MG/DL (ref 0.7–1.3)
CREAT BLD-MCNC: 2.12 MG/DL (ref 0.7–1.3)
CREAT BLD-MCNC: 2.35 MG/DL (ref 0.7–1.3)
CREAT BLD-MCNC: 2.49 MG/DL (ref 0.7–1.3)
CREAT BLD-MCNC: 2.71 MG/DL (ref 0.7–1.3)
CREAT BLD-MCNC: 2.86 MG/DL (ref 0.7–1.3)
CYKAPPA:CYLAMBDA RATIO: 0
CYTOPLASMIC KAPPA: 0.1 %
CYTOPLASMIC LAMBDA: 98.7 %
DEPRECATED HBV CORE AB SER IA-ACNC: >2000 NG/ML (ref 22–322)
EOSINOPHIL # BLD AUTO: 0 X10(3) UL (ref 0–0.3)
EOSINOPHIL # BLD AUTO: 0.06 X10(3) UL (ref 0–0.3)
EOSINOPHIL # BLD AUTO: 0.1 X10(3) UL (ref 0–0.3)
EOSINOPHIL # BLD AUTO: 0.11 X10(3) UL (ref 0–0.3)
EOSINOPHIL # BLD AUTO: 0.13 X10(3) UL (ref 0–0.3)
EOSINOPHIL # BLD AUTO: 0.15 X10(3) UL (ref 0–0.3)
EOSINOPHIL # BLD AUTO: 0.18 X10(3) UL (ref 0–0.3)
EOSINOPHIL # BLD AUTO: 0.2 X10(3) UL (ref 0–0.3)
EOSINOPHIL # BLD AUTO: 0.22 X10(3) UL (ref 0–0.3)
EOSINOPHIL # BLD AUTO: 0.29 X10(3) UL (ref 0–0.3)
EOSINOPHIL % MANUAL: 0 %
EOSINOPHIL ABSOLUTE MANUAL: 0 X10(3) UL (ref 0–0.3)
EOSINOPHIL NFR BLD AUTO: 0 %
EOSINOPHIL NFR BLD AUTO: 1.8 %
EOSINOPHIL NFR BLD AUTO: 1.8 %
EOSINOPHIL NFR BLD AUTO: 1.9 %
EOSINOPHIL NFR BLD AUTO: 2 %
EOSINOPHIL NFR BLD AUTO: 2.5 %
EOSINOPHIL NFR BLD AUTO: 2.7 %
EOSINOPHIL NFR BLD AUTO: 3.2 %
EOSINOPHIL NFR BLD AUTO: 4.1 %
EOSINOPHIL NFR BLD AUTO: 5.8 %
ERYTHROCYTE [DISTWIDTH] IN BLOOD BY AUTOMATED COUNT: 13.2 % (ref 11.5–16)
ERYTHROCYTE [DISTWIDTH] IN BLOOD BY AUTOMATED COUNT: 13.7 % (ref 11.5–16)
ERYTHROCYTE [DISTWIDTH] IN BLOOD BY AUTOMATED COUNT: 13.9 % (ref 11.5–16)
ERYTHROCYTE [DISTWIDTH] IN BLOOD BY AUTOMATED COUNT: 14.4 % (ref 11.5–16)
ERYTHROCYTE [DISTWIDTH] IN BLOOD BY AUTOMATED COUNT: 15.3 % (ref 11.5–16)
ERYTHROCYTE [DISTWIDTH] IN BLOOD BY AUTOMATED COUNT: 15.4 % (ref 11.5–16)
ERYTHROCYTE [DISTWIDTH] IN BLOOD BY AUTOMATED COUNT: 15.5 % (ref 11.5–16)
ERYTHROCYTE [DISTWIDTH] IN BLOOD BY AUTOMATED COUNT: 15.6 % (ref 11.5–16)
ERYTHROCYTE [DISTWIDTH] IN BLOOD BY AUTOMATED COUNT: 15.7 % (ref 11.5–16)
ERYTHROCYTE [DISTWIDTH] IN BLOOD BY AUTOMATED COUNT: 22.3 % (ref 11.5–16)
ERYTHROCYTE [DISTWIDTH] IN BLOOD BY AUTOMATED COUNT: 23.3 % (ref 11.5–16)
ERYTHROPOIETIN (EPO): 58 MU/ML
FIBRINOGEN: 330 MG/DL (ref 200–446)
FIO2: 60 %
FIO2: 70 %
FLUAV RNA SPEC QL NAA+PROBE: NEGATIVE
FLUBV RNA SPEC QL NAA+PROBE: NEGATIVE
FOLATE (FOLIC ACID), SERUM: 10.2 NG/ML (ref 8.7–24)
GAMMA GLOBULIN: 0.48 G/DL (ref 0.6–1.6)
GAMMA GLOBULIN: 0.55 G/DL (ref 0.6–1.6)
GLUCOSE BLD-MCNC: 103 MG/DL (ref 70–99)
GLUCOSE BLD-MCNC: 109 MG/DL (ref 70–99)
GLUCOSE BLD-MCNC: 114 MG/DL (ref 70–99)
GLUCOSE BLD-MCNC: 134 MG/DL (ref 70–99)
GLUCOSE BLD-MCNC: 76 MG/DL (ref 70–99)
GLUCOSE BLD-MCNC: 84 MG/DL (ref 70–99)
GLUCOSE BLD-MCNC: 85 MG/DL (ref 70–99)
GLUCOSE BLD-MCNC: 86 MG/DL (ref 70–99)
GLUCOSE BLD-MCNC: 91 MG/DL (ref 70–99)
GLUCOSE BLD-MCNC: 95 MG/DL (ref 70–99)
GLUCOSE BLD-MCNC: 96 MG/DL (ref 70–99)
GLUCOSE UR STRIP.AUTO-MCNC: 50 MG/DL
GRAN CASTS #/AREA URNS LPF: PRESENT /LPF
HAPTOGLOBIN: 116 MG/DL (ref 30–200)
HAV AB SERPL IA-ACNC: 1409 PG/ML (ref 193–986)
HAV IGM SER QL: 2.3 MG/DL (ref 1.7–3)
HBV DNA (IU/ML): 340 IU/ML
HBV DNA (LOG IU/ML): 2.5 LOG IU
HBV DNA INTERPRETATION: DETECTED
HCT VFR BLD AUTO: 21.4 % (ref 37–53)
HCT VFR BLD AUTO: 22.8 % (ref 37–53)
HCT VFR BLD AUTO: 23.1 % (ref 37–53)
HCT VFR BLD AUTO: 24.3 % (ref 37–53)
HCT VFR BLD AUTO: 24.7 % (ref 37–53)
HCT VFR BLD AUTO: 24.7 % (ref 37–53)
HCT VFR BLD AUTO: 25.1 % (ref 37–53)
HCT VFR BLD AUTO: 25.9 % (ref 37–53)
HCT VFR BLD AUTO: 27.9 % (ref 37–53)
HCT VFR BLD AUTO: 28.4 % (ref 37–53)
HCT VFR BLD AUTO: 29.7 % (ref 37–53)
HGB BLD-MCNC: 10.2 G/DL (ref 13–17)
HGB BLD-MCNC: 7.5 G/DL (ref 13–17)
HGB BLD-MCNC: 7.9 G/DL (ref 13–17)
HGB BLD-MCNC: 8.1 G/DL (ref 13–17)
HGB BLD-MCNC: 8.2 G/DL (ref 13–17)
HGB BLD-MCNC: 8.3 G/DL (ref 13–17)
HGB BLD-MCNC: 8.4 G/DL (ref 13–17)
HGB BLD-MCNC: 8.4 G/DL (ref 13–17)
HGB BLD-MCNC: 8.6 G/DL (ref 13–17)
HGB BLD-MCNC: 9.3 G/DL (ref 13–17)
HGB BLD-MCNC: 9.5 G/DL (ref 13–17)
HYALINE CASTS #/AREA URNS AUTO: PRESENT /LPF
IMMATURE GRANULOCYTE COUNT: 0.02 X10(3) UL (ref 0–1)
IMMATURE GRANULOCYTE COUNT: 0.07 X10(3) UL (ref 0–1)
IMMATURE GRANULOCYTE COUNT: 0.09 X10(3) UL (ref 0–1)
IMMATURE GRANULOCYTE COUNT: 0.1 X10(3) UL (ref 0–1)
IMMATURE GRANULOCYTE COUNT: 0.11 X10(3) UL (ref 0–1)
IMMATURE GRANULOCYTE COUNT: 0.15 X10(3) UL (ref 0–1)
IMMATURE GRANULOCYTE COUNT: 0.22 X10(3) UL (ref 0–1)
IMMATURE GRANULOCYTE COUNT: 0.3 X10(3) UL (ref 0–1)
IMMATURE GRANULOCYTE COUNT: 0.32 X10(3) UL (ref 0–1)
IMMATURE GRANULOCYTE COUNT: 0.39 X10(3) UL (ref 0–1)
IMMATURE GRANULOCYTE RATIO %: 0.5 %
IMMATURE GRANULOCYTE RATIO %: 1.3 %
IMMATURE GRANULOCYTE RATIO %: 1.4 %
IMMATURE GRANULOCYTE RATIO %: 1.8 %
IMMATURE GRANULOCYTE RATIO %: 2.7 %
IMMATURE GRANULOCYTE RATIO %: 3.3 %
IMMATURE GRANULOCYTE RATIO %: 3.8 %
IMMATURE GRANULOCYTE RATIO %: 4.2 %
IMMATURE GRANULOCYTE RATIO %: 4.3 %
IMMATURE GRANULOCYTE RATIO %: 4.7 %
IMMUNOGLOBULIN A: <7.83 MG/DL (ref 70–312)
IMMUNOGLOBULIN A: <7.83 MG/DL (ref 70–312)
IMMUNOGLOBULIN G: 437 MG/DL (ref 791–1643)
IMMUNOGLOBULIN G: 559 MG/DL (ref 791–1643)
IMMUNOGLOBULIN M: 14.4 MG/DL (ref 43–279)
IMMUNOGLOBULIN M: <5.3 MG/DL (ref 43–279)
INR BLD: 1.27 (ref 0.89–1.11)
IONIZED CALCIUM: 0.9 MMOL/L (ref 1.12–1.32)
IONIZED CALCIUM: 0.92 MMOL/L (ref 1.12–1.32)
IONIZED CALCIUM: 0.96 MMOL/L (ref 1.12–1.32)
IRON SATURATION: 50 % (ref 13–45)
IRON: 104 UG/DL (ref 45–182)
KAPPA FREE LIGHT CHAIN: 0.13 MG/DL (ref 0.33–1.94)
KAPPA FREE LIGHT CHAIN: 0.64 MG/DL (ref 0.33–1.94)
KAPPA/LAMBDA FLC RATIO: 0.02 (ref 0.26–1.65)
KAPPA/LAMBDA FLC RATIO: 0.11 (ref 0.26–1.65)
KAPPA:LAMBDA RATIO: 1.83
KETONES UR STRIP.AUTO-MCNC: NEGATIVE MG/DL
LACTIC ACID ARTERIAL: 3.4 MMOL/L (ref 0.5–2)
LACTIC ACID ARTERIAL: 4.1 MMOL/L (ref 0.5–2)
LACTIC ACID ARTERIAL: 4.3 MMOL/L (ref 0.5–2)
LACTIC ACID ARTERIAL: 4.7 MMOL/L (ref 0.5–2)
LACTIC ACID ARTERIAL: 5 MMOL/L (ref 0.5–2)
LACTIC ACID: 4.3 MMOL/L (ref 0.5–2)
LAMBDA FREE LIGHT CHAIN: 5.53 MG/DL (ref 0.57–2.63)
LAMBDA FREE LIGHT CHAIN: 6.62 MG/DL (ref 0.57–2.63)
LDH: 259 U/L (ref 84–249)
LEUKOCYTE ESTERASE UR QL STRIP.AUTO: NEGATIVE
LYMPHOCYTE % MANUAL: 4 %
LYMPHOCYTE ABSOLUTE MANUAL: 0.18 X10(3) UL (ref 0.9–4)
LYMPHOCYTES # BLD AUTO: 0.34 X10(3) UL (ref 0.9–4)
LYMPHOCYTES # BLD AUTO: 0.37 X10(3) UL (ref 0.9–4)
LYMPHOCYTES # BLD AUTO: 0.52 X10(3) UL (ref 0.9–4)
LYMPHOCYTES # BLD AUTO: 0.59 X10(3) UL (ref 0.9–4)
LYMPHOCYTES # BLD AUTO: 1 X10(3) UL (ref 0.9–4)
LYMPHOCYTES # BLD AUTO: 1.18 X10(3) UL (ref 0.9–4)
LYMPHOCYTES # BLD AUTO: 1.42 X10(3) UL (ref 0.9–4)
LYMPHOCYTES # BLD AUTO: 1.79 X10(3) UL (ref 0.9–4)
LYMPHOCYTES # BLD AUTO: 1.93 X10(3) UL (ref 0.9–4)
LYMPHOCYTES # BLD AUTO: 2 X10(3) UL (ref 0.9–4)
LYMPHOCYTES NFR BLD AUTO: 13.4 %
LYMPHOCYTES NFR BLD AUTO: 17.1 %
LYMPHOCYTES NFR BLD AUTO: 19.5 %
LYMPHOCYTES NFR BLD AUTO: 20.2 %
LYMPHOCYTES NFR BLD AUTO: 28 %
LYMPHOCYTES NFR BLD AUTO: 32.3 %
LYMPHOCYTES NFR BLD AUTO: 38.4 %
LYMPHOCYTES NFR BLD AUTO: 4.6 %
LYMPHOCYTES NFR BLD AUTO: 8.5 %
LYMPHOCYTES NFR BLD AUTO: 9.6 %
M PROTEIN MFR SERPL ELPH: 5.2 G/DL (ref 6.1–8.3)
M PROTEIN MFR SERPL ELPH: 6.1 G/DL (ref 6.1–8.3)
M PROTEIN MFR SERPL ELPH: 6.5 G/DL (ref 6.1–8.3)
M PROTEIN MFR SERPL ELPH: 6.8 G/DL (ref 6.1–8.3)
M PROTEIN MFR SERPL ELPH: 6.8 G/DL (ref 6.1–8.3)
M PROTEIN MFR SERPL ELPH: 7.1 G/DL (ref 6.1–8.3)
M PROTEIN MFR SERPL ELPH: 7.5 G/DL (ref 6.1–8.3)
M PROTEIN MFR SERPL ELPH: 7.7 G/DL (ref 6.1–8.3)
M PROTEIN MFR SERPL ELPH: 7.8 G/DL (ref 6.1–8.3)
M PROTEIN MFR SERPL ELPH: 8 G/DL (ref 6.1–8.3)
M-SPIKE 1: 0.25 G/DL (ref ?–0)
M-SPIKE 1: 0.32 G/DL (ref ?–0)
MCH RBC QN AUTO: 32.1 PG (ref 27–33.2)
MCH RBC QN AUTO: 32.4 PG (ref 27–33.2)
MCH RBC QN AUTO: 36.7 PG (ref 27–33.2)
MCH RBC QN AUTO: 37.2 PG (ref 27–33.2)
MCH RBC QN AUTO: 37.2 PG (ref 27–33.2)
MCH RBC QN AUTO: 37.3 PG (ref 27–33.2)
MCH RBC QN AUTO: 37.5 PG (ref 27–33.2)
MCH RBC QN AUTO: 37.6 PG (ref 27–33.2)
MCH RBC QN AUTO: 37.7 PG (ref 27–33.2)
MCH RBC QN AUTO: 37.7 PG (ref 27–33.2)
MCH RBC QN AUTO: 38.1 PG (ref 27–33.2)
MCHC RBC AUTO-ENTMCNC: 32.7 G/DL (ref 31–37)
MCHC RBC AUTO-ENTMCNC: 33.2 G/DL (ref 31–37)
MCHC RBC AUTO-ENTMCNC: 33.2 G/DL (ref 31–37)
MCHC RBC AUTO-ENTMCNC: 33.5 G/DL (ref 31–37)
MCHC RBC AUTO-ENTMCNC: 33.6 G/DL (ref 31–37)
MCHC RBC AUTO-ENTMCNC: 34.1 G/DL (ref 31–37)
MCHC RBC AUTO-ENTMCNC: 34.2 G/DL (ref 31–37)
MCHC RBC AUTO-ENTMCNC: 34.3 G/DL (ref 31–37)
MCHC RBC AUTO-ENTMCNC: 34.6 G/DL (ref 31–37)
MCHC RBC AUTO-ENTMCNC: 35 G/DL (ref 31–37)
MCHC RBC AUTO-ENTMCNC: 35.5 G/DL (ref 31–37)
MCV RBC AUTO: 106 FL (ref 80–99)
MCV RBC AUTO: 107.7 FL (ref 80–99)
MCV RBC AUTO: 108.4 FL (ref 80–99)
MCV RBC AUTO: 108.6 FL (ref 80–99)
MCV RBC AUTO: 109 FL (ref 80–99)
MCV RBC AUTO: 110.8 FL (ref 80–99)
MCV RBC AUTO: 112.1 FL (ref 80–99)
MCV RBC AUTO: 112.3 FL (ref 80–99)
MCV RBC AUTO: 113.1 FL (ref 80–99)
MCV RBC AUTO: 93.9 FL (ref 80–99)
MCV RBC AUTO: 99 FL (ref 80–99)
METAPNEUMOVIRUS PCR:: NEGATIVE
METHEMOGLOBIN: 0.7 % SAT (ref 0.4–1.5)
METHEMOGLOBIN: 0.9 % SAT (ref 0.4–1.5)
MONOCYTE % MANUAL: 6 %
MONOCYTE ABSOLUTE MANUAL: 0.26 X10(3) UL (ref 0.1–0.6)
MONOCYTES # BLD AUTO: 0.22 X10(3) UL (ref 0.1–0.6)
MONOCYTES # BLD AUTO: 0.3 X10(3) UL (ref 0.1–0.6)
MONOCYTES # BLD AUTO: 0.49 X10(3) UL (ref 0.1–0.6)
MONOCYTES # BLD AUTO: 0.62 X10(3) UL (ref 0.1–0.6)
MONOCYTES # BLD AUTO: 0.66 X10(3) UL (ref 0.1–0.6)
MONOCYTES # BLD AUTO: 0.66 X10(3) UL (ref 0.1–0.6)
MONOCYTES # BLD AUTO: 0.71 X10(3) UL (ref 0.1–0.6)
MONOCYTES # BLD AUTO: 0.95 X10(3) UL (ref 0.1–0.6)
MONOCYTES # BLD AUTO: 0.99 X10(3) UL (ref 0.1–0.6)
MONOCYTES # BLD AUTO: 1.1 X10(3) UL (ref 0.1–0.6)
MONOCYTES NFR BLD AUTO: 11.7 %
MONOCYTES NFR BLD AUTO: 11.9 %
MONOCYTES NFR BLD AUTO: 11.9 %
MONOCYTES NFR BLD AUTO: 12.2 %
MONOCYTES NFR BLD AUTO: 12.3 %
MONOCYTES NFR BLD AUTO: 14.7 %
MONOCYTES NFR BLD AUTO: 7.3 %
MONOCYTES NFR BLD AUTO: 7.5 %
MONOCYTES NFR BLD AUTO: 9.1 %
MONOCYTES NFR BLD AUTO: 9.2 %
MYCOPLASMA PNEUMONIA PCR:: NEGATIVE
NEUTROPHIL ABS PRELIM: 2.05 X10 (3) UL (ref 1.3–6.7)
NEUTROPHIL ABS PRELIM: 2.09 X10 (3) UL (ref 1.3–6.7)
NEUTROPHIL ABS PRELIM: 2.74 X10 (3) UL (ref 1.3–6.7)
NEUTROPHIL ABS PRELIM: 3.23 X10 (3) UL (ref 1.3–6.7)
NEUTROPHIL ABS PRELIM: 3.61 X10 (3) UL (ref 1.3–6.7)
NEUTROPHIL ABS PRELIM: 4.05 X10 (3) UL (ref 1.3–6.7)
NEUTROPHIL ABS PRELIM: 4.08 X10 (3) UL (ref 1.3–6.7)
NEUTROPHIL ABS PRELIM: 4.09 X10 (3) UL (ref 1.3–6.7)
NEUTROPHIL ABS PRELIM: 4.85 X10 (3) UL (ref 1.3–6.7)
NEUTROPHIL ABS PRELIM: 5.31 X10 (3) UL (ref 1.3–6.7)
NEUTROPHIL ABS PRELIM: 6.67 X10 (3) UL (ref 1.3–6.7)
NEUTROPHIL ABSOLUTE MANUAL: 3.96 X10(3) UL (ref 1.3–6.7)
NEUTROPHILS # BLD AUTO: 2.05 X10(3) UL (ref 1.3–6.7)
NEUTROPHILS # BLD AUTO: 2.09 X10(3) UL (ref 1.3–6.7)
NEUTROPHILS # BLD AUTO: 2.74 X10(3) UL (ref 1.3–6.7)
NEUTROPHILS # BLD AUTO: 3.23 X10(3) UL (ref 1.3–6.7)
NEUTROPHILS # BLD AUTO: 3.61 X10(3) UL (ref 1.3–6.7)
NEUTROPHILS # BLD AUTO: 4.05 X10(3) UL (ref 1.3–6.7)
NEUTROPHILS # BLD AUTO: 4.09 X10(3) UL (ref 1.3–6.7)
NEUTROPHILS # BLD AUTO: 4.85 X10(3) UL (ref 1.3–6.7)
NEUTROPHILS # BLD AUTO: 5.31 X10(3) UL (ref 1.3–6.7)
NEUTROPHILS # BLD AUTO: 6.67 X10(3) UL (ref 1.3–6.7)
NEUTROPHILS % MANUAL: 70 %
NEUTROPHILS NFR BLD AUTO: 41.5 %
NEUTROPHILS NFR BLD AUTO: 49.5 %
NEUTROPHILS NFR BLD AUTO: 56.7 %
NEUTROPHILS NFR BLD AUTO: 61.7 %
NEUTROPHILS NFR BLD AUTO: 63.9 %
NEUTROPHILS NFR BLD AUTO: 64.6 %
NEUTROPHILS NFR BLD AUTO: 67.9 %
NEUTROPHILS NFR BLD AUTO: 75.9 %
NEUTROPHILS NFR BLD AUTO: 81 %
NEUTROPHILS NFR BLD AUTO: 82.3 %
NITRITE UR QL STRIP.AUTO: NEGATIVE
NRBC CALCULATED: 10
P/F RATIO: 169 MMHG
PARAINFLUENZA 1 PCR:: NEGATIVE
PARAINFLUENZA 2 PCR:: NEGATIVE
PARAINFLUENZA 3 PCR:: NEGATIVE
PARAINFLUENZA 4 PCR:: NEGATIVE
PATIENT TEMPERATURE: 97.3 F
PATIENT TEMPERATURE: 98.6 F
PEEP: 10 CM H2O
PEEP: 5 CM H2O
PH UR STRIP.AUTO: 5 [PH] (ref 4.5–8)
PHOSPHATE SERPL-MCNC: 3.7 MG/DL (ref 2.5–4.9)
PLATELET # BLD AUTO: 107 10(3)UL (ref 150–450)
PLATELET # BLD AUTO: 112 10(3)UL (ref 150–450)
PLATELET # BLD AUTO: 114 10(3)UL (ref 150–450)
PLATELET # BLD AUTO: 120 10(3)UL (ref 150–450)
PLATELET # BLD AUTO: 124 10(3)UL (ref 150–450)
PLATELET # BLD AUTO: 161 10(3)UL (ref 150–450)
PLATELET # BLD AUTO: 52 10(3)UL (ref 150–450)
PLATELET # BLD AUTO: 60 10(3)UL (ref 150–450)
PLATELET # BLD AUTO: 63 10(3)UL (ref 150–450)
PLATELET # BLD AUTO: 66 10(3)UL (ref 150–450)
PLATELET # BLD AUTO: 89 10(3)UL (ref 150–450)
PLATELET MORPHOLOGY: NORMAL
POTASSIUM BLOOD GAS: 2.9 MMOL/L (ref 3.6–5.1)
POTASSIUM BLOOD GAS: 3.3 MMOL/L (ref 3.6–5.1)
POTASSIUM BLOOD GAS: 3.5 MMOL/L (ref 3.6–5.1)
POTASSIUM SERPL-SCNC: 3 MMOL/L (ref 3.6–5.1)
POTASSIUM SERPL-SCNC: 3.1 MMOL/L (ref 3.6–5.1)
POTASSIUM SERPL-SCNC: 3.7 MMOL/L (ref 3.6–5.1)
POTASSIUM SERPL-SCNC: 3.7 MMOL/L (ref 3.6–5.1)
POTASSIUM SERPL-SCNC: 3.8 MMOL/L (ref 3.6–5.1)
POTASSIUM SERPL-SCNC: 4 MMOL/L (ref 3.6–5.1)
POTASSIUM SERPL-SCNC: 4.1 MMOL/L (ref 3.6–5.1)
POTASSIUM SERPL-SCNC: 4.1 MMOL/L (ref 3.6–5.1)
POTASSIUM SERPL-SCNC: 4.2 MMOL/L (ref 3.6–5.1)
POTASSIUM SERPL-SCNC: 4.2 MMOL/L (ref 3.6–5.1)
POTASSIUM SERPL-SCNC: 4.5 MMOL/L (ref 3.6–5.1)
PROCALCITONIN SERPL-MCNC: 62.06 NG/ML (ref ?–0.11)
PROT UR STRIP.AUTO-MCNC: 30 MG/DL
PSA SERPL DL<=0.01 NG/ML-MCNC: 16 SECONDS (ref 12–14.3)
RBC # BLD AUTO: 1.97 X10(6)UL (ref 4.3–5.7)
RBC # BLD AUTO: 2.15 X10(6)UL (ref 4.3–5.7)
RBC # BLD AUTO: 2.2 X10(6)UL (ref 4.3–5.7)
RBC # BLD AUTO: 2.23 X10(6)UL (ref 4.3–5.7)
RBC # BLD AUTO: 2.23 X10(6)UL (ref 4.3–5.7)
RBC # BLD AUTO: 2.24 X10(6)UL (ref 4.3–5.7)
RBC # BLD AUTO: 2.29 X10(6)UL (ref 4.3–5.7)
RBC # BLD AUTO: 2.46 X10(6)UL (ref 4.3–5.7)
RBC # BLD AUTO: 2.59 X10(6)UL (ref 4.3–5.7)
RBC # BLD AUTO: 2.74 X10(6)UL (ref 4.3–5.7)
RBC # BLD AUTO: 2.87 X10(6)UL (ref 4.3–5.7)
RED CELL DISTRIBUTION WIDTH-SD: 51.4 FL (ref 35.1–46.3)
RED CELL DISTRIBUTION WIDTH-SD: 51.4 FL (ref 35.1–46.3)
RED CELL DISTRIBUTION WIDTH-SD: 54.4 FL (ref 35.1–46.3)
RED CELL DISTRIBUTION WIDTH-SD: 58.7 FL (ref 35.1–46.3)
RED CELL DISTRIBUTION WIDTH-SD: 60 FL (ref 35.1–46.3)
RED CELL DISTRIBUTION WIDTH-SD: 60.3 FL (ref 35.1–46.3)
RED CELL DISTRIBUTION WIDTH-SD: 61.7 FL (ref 35.1–46.3)
RED CELL DISTRIBUTION WIDTH-SD: 63.8 FL (ref 35.1–46.3)
RED CELL DISTRIBUTION WIDTH-SD: 64 FL (ref 35.1–46.3)
RED CELL DISTRIBUTION WIDTH-SD: 75.7 FL (ref 35.1–46.3)
RED CELL DISTRIBUTION WIDTH-SD: 79.4 FL (ref 35.1–46.3)
RETIC ABS CALC AUTO: 132.8 X10(3) UL (ref 22.5–147.5)
RETIC IRF CALC: 0.26 RATIO (ref 0.09–0.3)
RETIC%: 5.8 % (ref 0.5–2.5)
RETICULOCYTE HEMOGLOBIN EQUIVALENT: 44.1 PG (ref 28.2–36.3)
RH BLOOD TYPE: POSITIVE
RH BLOOD TYPE: POSITIVE
RHINOVIRUS/ENTERO PCR:: NEGATIVE
RSV RNA SPEC QL NAA+PROBE: NEGATIVE
SED RATE-ML: 24 MM/HR (ref 0–12)
SODIUM BLOOD GAS: 142 MMOL/L (ref 136–144)
SODIUM BLOOD GAS: 142 MMOL/L (ref 136–144)
SODIUM BLOOD GAS: 143 MMOL/L (ref 136–144)
SODIUM SERPL-SCNC: 138 MMOL/L (ref 136–144)
SODIUM SERPL-SCNC: 140 MMOL/L (ref 136–144)
SODIUM SERPL-SCNC: 141 MMOL/L (ref 136–144)
SODIUM SERPL-SCNC: 142 MMOL/L (ref 136–144)
SODIUM SERPL-SCNC: 142 MMOL/L (ref 136–144)
SODIUM SERPL-SCNC: 143 MMOL/L (ref 136–144)
SODIUM SERPL-SCNC: 143 MMOL/L (ref 136–144)
SODIUM SERPL-SCNC: 145 MMOL/L (ref 136–144)
SODIUM SERPL-SCNC: 146 MMOL/L (ref 136–144)
SP GR UR STRIP.AUTO: 1.01 (ref 1–1.03)
TIDAL VOLUME: 420 ML
TIDAL VOLUME: 420 ML
TIDAL VOLUME: 500 ML
TIDAL VOLUME: 500 ML
TIDAL VOLUME: 550 ML
TOTAL CELLS COUNTED: 100
TOTAL HEMOGLOBIN: 7.7 G/DL (ref 13.2–17.3)
TOTAL HEMOGLOBIN: 8.6 G/DL (ref 13.2–17.3)
TOTAL HEMOGLOBIN: 8.8 G/DL (ref 13.2–17.3)
TOTAL HEMOGLOBIN: 8.9 G/DL (ref 13.2–17.3)
TOTAL HEMOGLOBIN: 9.3 G/DL (ref 13.2–17.3)
TOTAL IRON BINDING CAPACITY: 210 UG/DL (ref 298–536)
TOTAL PROTEIN,SERUM: 6.3 G/DL (ref 6.1–8.3)
TOTAL PROTEIN,SERUM: 6.7 G/DL (ref 6.1–8.3)
TRANSFERRIN: 141 MG/DL (ref 200–360)
TROPONIN: 1.83 NG/ML (ref ?–0.05)
TROPONIN: 1.93 NG/ML (ref ?–0.05)
TSI SER-ACNC: 2.94 MIU/ML (ref 0.35–5.5)
URIC ACID: 9.6 MG/DL (ref 2.4–8.7)
UROBILINOGEN UR STRIP.AUTO-MCNC: <2 MG/DL
VENT RATE: 12 /MIN
VENT RATE: 24 /MIN
VENT RATE: 30 /MIN
WBC # BLD AUTO: 3 X10(3) UL (ref 4–13)
WBC # BLD AUTO: 4 X10(3) UL (ref 4–13)
WBC # BLD AUTO: 4.4 X10(3) UL (ref 4–13)
WBC # BLD AUTO: 5 X10(3) UL (ref 4–13)
WBC # BLD AUTO: 5.4 X10(3) UL (ref 4–13)
WBC # BLD AUTO: 5.5 X10(3) UL (ref 4–13)
WBC # BLD AUTO: 5.8 X10(3) UL (ref 4–13)
WBC # BLD AUTO: 7.2 X10(3) UL (ref 4–13)
WBC # BLD AUTO: 7.5 X10(3) UL (ref 4–13)
WBC # BLD AUTO: 8.1 X10(3) UL (ref 4–13)
WBC # BLD AUTO: 8.3 X10(3) UL (ref 4–13)

## 2017-01-01 PROCEDURE — 96415 CHEMO IV INFUSION ADDL HR: CPT

## 2017-01-01 PROCEDURE — 36430 TRANSFUSION BLD/BLD COMPNT: CPT

## 2017-01-01 PROCEDURE — 96409 CHEMO IV PUSH SNGL DRUG: CPT

## 2017-01-01 PROCEDURE — 99223 1ST HOSP IP/OBS HIGH 75: CPT | Performed by: HOSPITALIST

## 2017-01-01 PROCEDURE — 96372 THER/PROPH/DIAG INJ SC/IM: CPT

## 2017-01-01 PROCEDURE — 96413 CHEMO IV INFUSION 1 HR: CPT

## 2017-01-01 PROCEDURE — 86922 COMPATIBILITY TEST ANTIGLOB: CPT

## 2017-01-01 PROCEDURE — 96401 CHEMO ANTI-NEOPL SQ/IM: CPT

## 2017-01-01 PROCEDURE — 86920 COMPATIBILITY TEST SPIN: CPT

## 2017-01-01 PROCEDURE — 96375 TX/PRO/DX INJ NEW DRUG ADDON: CPT

## 2017-01-01 PROCEDURE — 85025 COMPLETE CBC W/AUTO DIFF WBC: CPT

## 2017-01-01 PROCEDURE — 80053 COMPREHEN METABOLIC PANEL: CPT

## 2017-01-01 PROCEDURE — 99215 OFFICE O/P EST HI 40 MIN: CPT | Performed by: SPECIALIST

## 2017-01-01 PROCEDURE — 87517 HEPATITIS B DNA QUANT: CPT

## 2017-01-01 PROCEDURE — 99214 OFFICE O/P EST MOD 30 MIN: CPT | Performed by: NURSE PRACTITIONER

## 2017-01-01 PROCEDURE — 85027 COMPLETE CBC AUTOMATED: CPT

## 2017-01-01 PROCEDURE — 36415 COLL VENOUS BLD VENIPUNCTURE: CPT

## 2017-01-01 PROCEDURE — 85045 AUTOMATED RETICULOCYTE COUNT: CPT

## 2017-01-01 PROCEDURE — G9678 ONCOLOGY CARE MODEL SERVICE: HCPCS | Performed by: INTERNAL MEDICINE

## 2017-01-01 PROCEDURE — 99214 OFFICE O/P EST MOD 30 MIN: CPT | Performed by: CLINICAL NURSE SPECIALIST

## 2017-01-01 PROCEDURE — 77075 RADEX OSSEOUS SURVEY COMPL: CPT | Performed by: SPECIALIST

## 2017-01-01 PROCEDURE — 96367 TX/PROPH/DG ADDL SEQ IV INF: CPT

## 2017-01-01 PROCEDURE — 99214 OFFICE O/P EST MOD 30 MIN: CPT | Performed by: INTERNAL MEDICINE

## 2017-01-01 PROCEDURE — 86077 PHYS BLOOD BANK SERV XMATCH: CPT

## 2017-01-01 PROCEDURE — 82272 OCCULT BLD FECES 1-3 TESTS: CPT

## 2017-01-01 PROCEDURE — 86870 RBC ANTIBODY IDENTIFICATION: CPT

## 2017-01-01 PROCEDURE — 96376 TX/PRO/DX INJ SAME DRUG ADON: CPT

## 2017-01-01 PROCEDURE — G0438 PPPS, INITIAL VISIT: HCPCS | Performed by: FAMILY MEDICINE

## 2017-01-01 PROCEDURE — 86905 BLOOD TYPING RBC ANTIGENS: CPT

## 2017-01-01 PROCEDURE — 86140 C-REACTIVE PROTEIN: CPT

## 2017-01-01 PROCEDURE — 80048 BASIC METABOLIC PNL TOTAL CA: CPT

## 2017-01-01 PROCEDURE — 93000 ELECTROCARDIOGRAM COMPLETE: CPT | Performed by: FAMILY MEDICINE

## 2017-01-01 PROCEDURE — 99214 OFFICE O/P EST MOD 30 MIN: CPT | Performed by: FAMILY MEDICINE

## 2017-01-01 PROCEDURE — 85652 RBC SED RATE AUTOMATED: CPT

## 2017-01-01 PROCEDURE — 5A1955Z RESPIRATORY VENTILATION, GREATER THAN 96 CONSECUTIVE HOURS: ICD-10-PCS | Performed by: HOSPITALIST

## 2017-01-01 PROCEDURE — 71010 XR CHEST AP PORTABLE  (CPT=71010): CPT | Performed by: INTERNAL MEDICINE

## 2017-01-01 PROCEDURE — 96361 HYDRATE IV INFUSION ADD-ON: CPT

## 2017-01-01 PROCEDURE — 84165 PROTEIN E-PHORESIS SERUM: CPT

## 2017-01-01 PROCEDURE — 72148 MRI LUMBAR SPINE W/O DYE: CPT | Performed by: PHYSICIAN ASSISTANT

## 2017-01-01 PROCEDURE — G9678 ONCOLOGY CARE MODEL SERVICE: HCPCS | Performed by: SPECIALIST

## 2017-01-01 PROCEDURE — 86334 IMMUNOFIX E-PHORESIS SERUM: CPT

## 2017-01-01 PROCEDURE — 72146 MRI CHEST SPINE W/O DYE: CPT | Performed by: PHYSICIAN ASSISTANT

## 2017-01-01 PROCEDURE — 86901 BLOOD TYPING SEROLOGIC RH(D): CPT

## 2017-01-01 PROCEDURE — 83883 ASSAY NEPHELOMETRY NOT SPEC: CPT

## 2017-01-01 PROCEDURE — 86970 RBC PRETX INCUBATJ W/CHEMICL: CPT

## 2017-01-01 PROCEDURE — 86850 RBC ANTIBODY SCREEN: CPT

## 2017-01-01 PROCEDURE — 70450 CT HEAD/BRAIN W/O DYE: CPT | Performed by: INTERNAL MEDICINE

## 2017-01-01 PROCEDURE — 93306 TTE W/DOPPLER COMPLETE: CPT | Performed by: INTERNAL MEDICINE

## 2017-01-01 PROCEDURE — 84550 ASSAY OF BLOOD/URIC ACID: CPT

## 2017-01-01 PROCEDURE — 99215 OFFICE O/P EST HI 40 MIN: CPT | Performed by: CLINICAL NURSE SPECIALIST

## 2017-01-01 PROCEDURE — 85007 BL SMEAR W/DIFF WBC COUNT: CPT

## 2017-01-01 PROCEDURE — 72141 MRI NECK SPINE W/O DYE: CPT | Performed by: PHYSICIAN ASSISTANT

## 2017-01-01 PROCEDURE — 86900 BLOOD TYPING SEROLOGIC ABO: CPT

## 2017-01-01 PROCEDURE — 86335 IMMUNFIX E-PHORSIS/URINE/CSF: CPT

## 2017-01-01 PROCEDURE — 84156 ASSAY OF PROTEIN URINE: CPT

## 2017-01-01 PROCEDURE — 96411 CHEMO IV PUSH ADDL DRUG: CPT

## 2017-01-01 PROCEDURE — 85018 HEMOGLOBIN: CPT

## 2017-01-01 PROCEDURE — 99223 1ST HOSP IP/OBS HIGH 75: CPT | Performed by: INTERNAL MEDICINE

## 2017-01-01 PROCEDURE — 71250 CT THORAX DX C-: CPT | Performed by: INTERNAL MEDICINE

## 2017-01-01 PROCEDURE — 96366 THER/PROPH/DIAG IV INF ADDON: CPT

## 2017-01-01 PROCEDURE — 99215 OFFICE O/P EST HI 40 MIN: CPT | Performed by: INTERNAL MEDICINE

## 2017-01-01 RX ORDER — LORAZEPAM 0.5 MG/1
TABLET ORAL EVERY 4 HOURS PRN
Status: CANCELLED | OUTPATIENT
Start: 2017-01-01

## 2017-01-01 RX ORDER — IPRATROPIUM BROMIDE AND ALBUTEROL SULFATE 2.5; .5 MG/3ML; MG/3ML
3 SOLUTION RESPIRATORY (INHALATION) EVERY 4 HOURS PRN
Status: DISCONTINUED | OUTPATIENT
Start: 2017-01-01 | End: 2018-01-01

## 2017-01-01 RX ORDER — DIPHENHYDRAMINE HYDROCHLORIDE 50 MG/ML
50 INJECTION INTRAMUSCULAR; INTRAVENOUS ONCE
Status: CANCELLED
Start: 2017-01-01 | End: 2017-01-01

## 2017-01-01 RX ORDER — SODIUM CHLORIDE, SODIUM LACTATE, POTASSIUM CHLORIDE, CALCIUM CHLORIDE 600; 310; 30; 20 MG/100ML; MG/100ML; MG/100ML; MG/100ML
INJECTION, SOLUTION INTRAVENOUS CONTINUOUS
Status: DISCONTINUED | OUTPATIENT
Start: 2017-01-01 | End: 2017-01-01

## 2017-01-01 RX ORDER — RANITIDINE 25 MG/ML
50 INJECTION, SOLUTION INTRAMUSCULAR; INTRAVENOUS AS NEEDED
Status: CANCELLED | OUTPATIENT
Start: 2017-01-01

## 2017-01-01 RX ORDER — PROCHLORPERAZINE MALEATE 10 MG
10 TABLET ORAL EVERY 6 HOURS PRN
Status: CANCELLED | OUTPATIENT
Start: 2017-01-01

## 2017-01-01 RX ORDER — ALBUTEROL SULFATE 90 UG/1
2 AEROSOL, METERED RESPIRATORY (INHALATION) AS NEEDED
Status: CANCELLED | OUTPATIENT
Start: 2017-01-01

## 2017-01-01 RX ORDER — DIPHENHYDRAMINE HCL 25 MG
25 CAPSULE ORAL ONCE
Status: COMPLETED | OUTPATIENT
Start: 2017-01-01 | End: 2017-01-01

## 2017-01-01 RX ORDER — METOCLOPRAMIDE HYDROCHLORIDE 5 MG/ML
10 INJECTION INTRAMUSCULAR; INTRAVENOUS EVERY 6 HOURS PRN
Status: CANCELLED | OUTPATIENT
Start: 2017-01-01

## 2017-01-01 RX ORDER — LORAZEPAM 2 MG/ML
INJECTION INTRAMUSCULAR EVERY 4 HOURS PRN
Status: CANCELLED | OUTPATIENT
Start: 2017-01-01

## 2017-01-01 RX ORDER — ONDANSETRON 2 MG/ML
8 INJECTION INTRAMUSCULAR; INTRAVENOUS EVERY 6 HOURS PRN
Status: CANCELLED | OUTPATIENT
Start: 2017-01-01

## 2017-01-01 RX ORDER — FAMOTIDINE 10 MG/ML
20 INJECTION, SOLUTION INTRAVENOUS DAILY
Status: DISCONTINUED | OUTPATIENT
Start: 2017-01-01 | End: 2017-01-01

## 2017-01-01 RX ORDER — ACETAMINOPHEN 325 MG/1
650 TABLET ORAL ONCE
Status: COMPLETED | OUTPATIENT
Start: 2017-01-01 | End: 2017-01-01

## 2017-01-01 RX ORDER — SODIUM BICARBONATE 650 MG/1
650 TABLET ORAL 3 TIMES DAILY
Qty: 90 TABLET | Refills: 5 | Status: SHIPPED | OUTPATIENT
Start: 2017-01-01 | End: 2018-01-01

## 2017-01-01 RX ORDER — METHYLPREDNISOLONE 4 MG/1
TABLET ORAL
Qty: 40 TABLET | Refills: 0 | Status: SHIPPED | OUTPATIENT
Start: 2017-01-01 | End: 2017-01-01

## 2017-01-01 RX ORDER — DIFLUPREDNATE 0.5 MG/ML
1 EMULSION OPHTHALMIC DAILY
Status: DISCONTINUED | OUTPATIENT
Start: 2018-01-01 | End: 2018-01-01

## 2017-01-01 RX ORDER — ACETAMINOPHEN 325 MG/1
TABLET ORAL EVERY 6 HOURS PRN
Status: CANCELLED | OUTPATIENT
Start: 2017-01-01

## 2017-01-01 RX ORDER — DIPHENHYDRAMINE HCL 25 MG
25 CAPSULE ORAL EVERY 6 HOURS PRN
Status: CANCELLED
Start: 2017-01-01

## 2017-01-01 RX ORDER — ACYCLOVIR 200 MG/5ML
400 SUSPENSION ORAL 2 TIMES DAILY
Status: DISCONTINUED | OUTPATIENT
Start: 2017-01-01 | End: 2018-01-01

## 2017-01-01 RX ORDER — DIPHENHYDRAMINE HYDROCHLORIDE 50 MG/ML
INJECTION INTRAMUSCULAR; INTRAVENOUS EVERY 4 HOURS PRN
Status: CANCELLED | OUTPATIENT
Start: 2017-01-01

## 2017-01-01 RX ORDER — ACYCLOVIR 400 MG/1
TABLET ORAL
Qty: 60 TABLET | Refills: 0 | Status: SHIPPED | OUTPATIENT
Start: 2017-01-01 | End: 2017-01-01

## 2017-01-01 RX ORDER — DIPHENHYDRAMINE HYDROCHLORIDE 50 MG/ML
50 INJECTION INTRAMUSCULAR; INTRAVENOUS ONCE
Status: CANCELLED | OUTPATIENT
Start: 2017-01-01 | End: 2017-01-01

## 2017-01-01 RX ORDER — MEPERIDINE HYDROCHLORIDE 25 MG/ML
INJECTION INTRAMUSCULAR; INTRAVENOUS; SUBCUTANEOUS AS NEEDED
Status: CANCELLED | OUTPATIENT
Start: 2017-01-01

## 2017-01-01 RX ORDER — DEXAMETHASONE 4 MG/1
TABLET ORAL
Qty: 10 TABLET | Refills: 0 | Status: SHIPPED | OUTPATIENT
Start: 2017-01-01 | End: 2017-01-01

## 2017-01-01 RX ORDER — IPRATROPIUM BROMIDE AND ALBUTEROL SULFATE 2.5; .5 MG/3ML; MG/3ML
3 SOLUTION RESPIRATORY (INHALATION)
Status: DISCONTINUED | OUTPATIENT
Start: 2017-01-01 | End: 2018-01-01

## 2017-01-01 RX ORDER — METHYLPREDNISOLONE SODIUM SUCCINATE 500 MG/1
100 INJECTION, POWDER, FOR SOLUTION INTRAMUSCULAR; INTRAVENOUS ONCE
Status: CANCELLED
Start: 2017-01-01 | End: 2017-01-01

## 2017-01-01 RX ORDER — ACETAMINOPHEN 325 MG/1
650 TABLET ORAL ONCE
Status: CANCELLED | OUTPATIENT
Start: 2017-01-01

## 2017-01-01 RX ORDER — METHYLPREDNISOLONE SODIUM SUCCINATE 125 MG/2ML
100 INJECTION, POWDER, LYOPHILIZED, FOR SOLUTION INTRAMUSCULAR; INTRAVENOUS ONCE
Status: COMPLETED | OUTPATIENT
Start: 2017-01-01 | End: 2017-01-01

## 2017-01-01 RX ORDER — ONDANSETRON 2 MG/ML
4 INJECTION INTRAMUSCULAR; INTRAVENOUS EVERY 6 HOURS PRN
Status: DISCONTINUED | OUTPATIENT
Start: 2017-01-01 | End: 2018-01-01

## 2017-01-01 RX ORDER — DIPHENHYDRAMINE HCL 25 MG
25 CAPSULE ORAL ONCE
Status: CANCELLED
Start: 2017-01-01 | End: 2017-01-01

## 2017-01-01 RX ORDER — SODIUM PHOSPHATE, DIBASIC AND SODIUM PHOSPHATE, MONOBASIC 7; 19 G/133ML; G/133ML
1 ENEMA RECTAL ONCE AS NEEDED
Status: DISCONTINUED | OUTPATIENT
Start: 2017-01-01 | End: 2018-01-01

## 2017-01-01 RX ORDER — ACETAMINOPHEN 650 MG/1
650 SUPPOSITORY RECTAL EVERY 6 HOURS PRN
Status: DISCONTINUED | OUTPATIENT
Start: 2017-01-01 | End: 2018-01-01

## 2017-01-01 RX ORDER — METHYLPREDNISOLONE 4 MG/1
TABLET ORAL
Qty: 40 TABLET | Refills: 3 | Status: SHIPPED | OUTPATIENT
Start: 2017-01-01 | End: 2017-01-01 | Stop reason: ALTCHOICE

## 2017-01-01 RX ORDER — POLYETHYLENE GLYCOL 3350 17 G/17G
17 POWDER, FOR SOLUTION ORAL DAILY PRN
Status: DISCONTINUED | OUTPATIENT
Start: 2017-01-01 | End: 2018-01-01

## 2017-01-01 RX ORDER — TRIPROLIDINE/PSEUDOEPHEDRINE 2.5MG-60MG
TABLET ORAL
Refills: 1 | COMMUNITY
Start: 2017-01-01 | End: 2018-01-01 | Stop reason: ALTCHOICE

## 2017-01-01 RX ORDER — DEXAMETHASONE 4 MG/1
8 TABLET ORAL ONCE
Status: COMPLETED | OUTPATIENT
Start: 2017-01-01 | End: 2017-01-01

## 2017-01-01 RX ORDER — ACETAMINOPHEN 325 MG/1
650 TABLET ORAL EVERY 6 HOURS PRN
Status: DISCONTINUED | OUTPATIENT
Start: 2017-01-01 | End: 2017-01-01

## 2017-01-01 RX ORDER — DIPHENHYDRAMINE HYDROCHLORIDE 50 MG/ML
50 INJECTION INTRAMUSCULAR; INTRAVENOUS ONCE
Status: COMPLETED | OUTPATIENT
Start: 2017-01-01 | End: 2017-01-01

## 2017-01-01 RX ORDER — ACETAMINOPHEN 160 MG/5ML
650 SOLUTION ORAL EVERY 6 HOURS PRN
Status: DISCONTINUED | OUTPATIENT
Start: 2017-01-01 | End: 2018-01-01

## 2017-01-01 RX ORDER — SODIUM BICARBONATE 650 MG/1
650 TABLET ORAL 3 TIMES DAILY
Qty: 60 TABLET | Refills: 3 | Status: SHIPPED | OUTPATIENT
Start: 2017-01-01 | End: 2017-01-01

## 2017-01-01 RX ORDER — DIPHENHYDRAMINE HCL 25 MG
25 CAPSULE ORAL ONCE
Status: CANCELLED | OUTPATIENT
Start: 2017-01-01

## 2017-01-01 RX ORDER — IPRATROPIUM BROMIDE AND ALBUTEROL SULFATE 2.5; .5 MG/3ML; MG/3ML
3 SOLUTION RESPIRATORY (INHALATION)
Status: DISCONTINUED | OUTPATIENT
Start: 2017-01-01 | End: 2017-01-01

## 2017-01-01 RX ORDER — DEXAMETHASONE 4 MG/1
8 TABLET ORAL 2 TIMES DAILY
Qty: 8 TABLET | Refills: 0 | Status: SHIPPED | OUTPATIENT
Start: 2017-01-01 | End: 2017-01-01 | Stop reason: ALTCHOICE

## 2017-01-01 RX ORDER — SODIUM BICARBONATE 650 MG/1
650 TABLET ORAL 2 TIMES DAILY
Qty: 60 TABLET | Refills: 3 | Status: SHIPPED | OUTPATIENT
Start: 2017-01-01 | End: 2017-01-01

## 2017-01-01 RX ORDER — POTASSIUM CHLORIDE 29.8 MG/ML
40 INJECTION INTRAVENOUS ONCE
Status: COMPLETED | OUTPATIENT
Start: 2017-01-01 | End: 2017-01-01

## 2017-01-01 RX ORDER — FAMOTIDINE 10 MG/ML
20 INJECTION, SOLUTION INTRAVENOUS DAILY
Status: DISCONTINUED | OUTPATIENT
Start: 2017-01-01 | End: 2018-01-01

## 2017-01-01 RX ORDER — DEXAMETHASONE 4 MG/1
4 TABLET ORAL
Qty: 10 TABLET | Refills: 0 | Status: SHIPPED | OUTPATIENT
Start: 2017-01-01 | End: 2017-01-01

## 2017-01-01 RX ORDER — CALCIUM GLUCONATE-DEXTROSE IV SOLN 2 GRAM/100ML-5% 2-5/100 GM/ML-%
2 SOLUTION INTRAVENOUS ONCE
Status: COMPLETED | OUTPATIENT
Start: 2018-01-01 | End: 2018-01-01

## 2017-01-01 RX ORDER — PANTOPRAZOLE SODIUM 40 MG/1
TABLET, DELAYED RELEASE ORAL
Qty: 30 TABLET | Refills: 5 | Status: SHIPPED | OUTPATIENT
Start: 2017-01-01 | End: 2017-01-01

## 2017-01-01 RX ORDER — DEXAMETHASONE 4 MG/1
TABLET ORAL
Qty: 10 TABLET | Refills: 0 | Status: SHIPPED | OUTPATIENT
Start: 2017-01-01 | End: 2017-01-01 | Stop reason: ALTCHOICE

## 2017-01-01 RX ORDER — HEPARIN SODIUM 5000 [USP'U]/ML
5000 INJECTION, SOLUTION INTRAVENOUS; SUBCUTANEOUS EVERY 12 HOURS SCHEDULED
Status: DISCONTINUED | OUTPATIENT
Start: 2017-01-01 | End: 2018-01-01

## 2017-01-01 RX ORDER — ALLOPURINOL 100 MG/1
TABLET ORAL
Qty: 30 TABLET | Refills: 0 | Status: SHIPPED | OUTPATIENT
Start: 2017-01-01 | End: 2017-01-01

## 2017-01-01 RX ORDER — ACYCLOVIR 400 MG/1
TABLET ORAL
Qty: 60 TABLET | Refills: 0 | Status: ON HOLD | OUTPATIENT
Start: 2017-01-01 | End: 2018-01-01

## 2017-01-01 RX ORDER — METHYLPREDNISOLONE SODIUM SUCCINATE 125 MG/2ML
60 INJECTION, POWDER, LYOPHILIZED, FOR SOLUTION INTRAMUSCULAR; INTRAVENOUS ONCE
Status: COMPLETED | OUTPATIENT
Start: 2017-01-01 | End: 2017-01-01

## 2017-01-01 RX ORDER — POTASSIUM CHLORIDE 750 MG/1
20 TABLET, EXTENDED RELEASE ORAL ONCE
Status: COMPLETED | OUTPATIENT
Start: 2017-01-01 | End: 2017-01-01

## 2017-01-01 RX ORDER — METHYLPREDNISOLONE SODIUM SUCCINATE 125 MG/2ML
125 INJECTION, POWDER, LYOPHILIZED, FOR SOLUTION INTRAMUSCULAR; INTRAVENOUS EVERY 6 HOURS
Status: CANCELLED
Start: 2017-01-01

## 2017-01-01 RX ORDER — PANTOPRAZOLE SODIUM 40 MG/1
TABLET, DELAYED RELEASE ORAL
Qty: 90 TABLET | Refills: 0 | Status: SHIPPED | OUTPATIENT
Start: 2017-01-01 | End: 2018-01-01

## 2017-01-01 RX ORDER — DOCUSATE SODIUM 100 MG/1
100 CAPSULE, LIQUID FILLED ORAL 2 TIMES DAILY
Status: DISCONTINUED | OUTPATIENT
Start: 2017-01-01 | End: 2017-01-01

## 2017-01-01 RX ORDER — BESIFLOXACIN 6 MG/ML
SUSPENSION OPHTHALMIC
Refills: 1 | Status: ON HOLD | COMMUNITY
Start: 2017-01-01 | End: 2017-01-01

## 2017-01-01 RX ORDER — ALLOPURINOL 100 MG/1
TABLET ORAL
Qty: 90 TABLET | Refills: 0 | Status: SHIPPED | OUTPATIENT
Start: 2017-01-01 | End: 2018-01-01

## 2017-01-01 RX ORDER — SODIUM CHLORIDE 9 MG/ML
INJECTION, SOLUTION INTRAVENOUS CONTINUOUS
Status: DISCONTINUED | OUTPATIENT
Start: 2017-01-01 | End: 2018-01-01

## 2017-01-01 RX ORDER — ALLOPURINOL 100 MG/1
100 TABLET ORAL DAILY
Status: DISCONTINUED | OUTPATIENT
Start: 2017-01-01 | End: 2017-01-01

## 2017-01-01 RX ORDER — METHYLPREDNISOLONE SODIUM SUCCINATE 125 MG/2ML
60 INJECTION, POWDER, LYOPHILIZED, FOR SOLUTION INTRAMUSCULAR; INTRAVENOUS ONCE
Status: CANCELLED
Start: 2017-01-01 | End: 2017-01-01

## 2017-01-01 RX ORDER — ACETAMINOPHEN 325 MG/1
650 TABLET ORAL EVERY 6 HOURS PRN
Status: DISCONTINUED | OUTPATIENT
Start: 2017-01-01 | End: 2018-01-01

## 2017-01-01 RX ORDER — LORAZEPAM 0.5 MG/1
0.5 TABLET ORAL EVERY 8 HOURS PRN
Qty: 30 TABLET | Refills: 3 | Status: SHIPPED
Start: 2017-01-01 | End: 2017-01-01 | Stop reason: ALTCHOICE

## 2017-01-01 RX ORDER — CHLORHEXIDINE GLUCONATE 0.12 MG/ML
15 RINSE ORAL
Status: DISCONTINUED | OUTPATIENT
Start: 2017-01-01 | End: 2018-01-01

## 2017-01-01 RX ORDER — DEXAMETHASONE 4 MG/1
8 TABLET ORAL 2 TIMES DAILY
Qty: 16 TABLET | Status: SHIPPED | OUTPATIENT
Start: 2017-01-01 | End: 2017-01-01 | Stop reason: ALTCHOICE

## 2017-01-01 RX ORDER — ALLOPURINOL 100 MG/1
100 TABLET ORAL DAILY
Qty: 30 TABLET | Refills: 0 | Status: SHIPPED | OUTPATIENT
Start: 2017-01-01 | End: 2017-01-01

## 2017-01-01 RX ORDER — BISACODYL 10 MG
10 SUPPOSITORY, RECTAL RECTAL
Status: DISCONTINUED | OUTPATIENT
Start: 2017-01-01 | End: 2018-01-01

## 2017-01-01 RX ORDER — PANTOPRAZOLE SODIUM 40 MG/1
TABLET, DELAYED RELEASE ORAL
Qty: 90 TABLET | Refills: 0 | Status: SHIPPED | OUTPATIENT
Start: 2017-01-01 | End: 2017-01-01

## 2017-01-01 RX ADMIN — ACETAMINOPHEN 650 MG: 325 TABLET ORAL at 10:54:00

## 2017-01-01 RX ADMIN — DIPHENHYDRAMINE HYDROCHLORIDE 50 MG: 50 INJECTION INTRAMUSCULAR; INTRAVENOUS at 12:40:00

## 2017-01-01 RX ADMIN — DIPHENHYDRAMINE HCL 25 MG: 25 MG CAPSULE ORAL at 10:05:00

## 2017-01-01 RX ADMIN — DIPHENHYDRAMINE HCL 25 MG: 25 MG CAPSULE ORAL at 13:48:00

## 2017-01-01 RX ADMIN — DIPHENHYDRAMINE HCL 25 MG: 25 MG CAPSULE ORAL at 12:18:00

## 2017-01-01 RX ADMIN — METHYLPREDNISOLONE SODIUM SUCCINATE 60 MG: 125 INJECTION, POWDER, LYOPHILIZED, FOR SOLUTION INTRAMUSCULAR; INTRAVENOUS at 10:30:00

## 2017-01-01 RX ADMIN — ACETAMINOPHEN 650 MG: 325 TABLET ORAL at 09:38:00

## 2017-01-01 RX ADMIN — ACETAMINOPHEN 650 MG: 325 TABLET ORAL at 12:18:00

## 2017-01-01 RX ADMIN — DEXAMETHASONE 8 MG: 4 TABLET ORAL at 17:40:00

## 2017-01-01 RX ADMIN — DIPHENHYDRAMINE HCL 25 MG: 25 MG CAPSULE ORAL at 10:30:00

## 2017-01-01 RX ADMIN — DIPHENHYDRAMINE HYDROCHLORIDE 50 MG: 50 INJECTION INTRAMUSCULAR; INTRAVENOUS at 10:25:00

## 2017-01-01 RX ADMIN — ACETAMINOPHEN 650 MG: 325 TABLET ORAL at 11:41:00

## 2017-01-01 RX ADMIN — ACETAMINOPHEN 650 MG: 325 TABLET ORAL at 10:05:00

## 2017-01-01 RX ADMIN — DIPHENHYDRAMINE HYDROCHLORIDE 50 MG: 50 INJECTION INTRAMUSCULAR; INTRAVENOUS at 10:05:00

## 2017-01-01 RX ADMIN — ACETAMINOPHEN 650 MG: 325 TABLET ORAL at 09:30:00

## 2017-01-01 RX ADMIN — ACETAMINOPHEN 650 MG: 325 TABLET ORAL at 09:05:00

## 2017-01-01 RX ADMIN — METHYLPREDNISOLONE SODIUM SUCCINATE 100 MG: 125 INJECTION, POWDER, LYOPHILIZED, FOR SOLUTION INTRAMUSCULAR; INTRAVENOUS at 11:03:00

## 2017-01-01 RX ADMIN — ACETAMINOPHEN 650 MG: 325 TABLET ORAL at 11:22:00

## 2017-01-01 RX ADMIN — ACETAMINOPHEN 650 MG: 325 TABLET ORAL at 10:30:00

## 2017-01-01 RX ADMIN — METHYLPREDNISOLONE SODIUM SUCCINATE 100 MG: 125 INJECTION, POWDER, LYOPHILIZED, FOR SOLUTION INTRAMUSCULAR; INTRAVENOUS at 12:41:00

## 2017-01-01 RX ADMIN — METHYLPREDNISOLONE SODIUM SUCCINATE 100 MG: 125 INJECTION, POWDER, LYOPHILIZED, FOR SOLUTION INTRAMUSCULAR; INTRAVENOUS at 10:05:00

## 2017-01-01 RX ADMIN — POTASSIUM CHLORIDE 20 MEQ: 750 TABLET, EXTENDED RELEASE ORAL at 13:32:00

## 2017-01-01 RX ADMIN — DIPHENHYDRAMINE HYDROCHLORIDE 50 MG: 50 INJECTION INTRAMUSCULAR; INTRAVENOUS at 11:00:00

## 2017-01-01 RX ADMIN — ACETAMINOPHEN 650 MG: 325 TABLET ORAL at 13:48:00

## 2017-01-01 RX ADMIN — ACETAMINOPHEN 650 MG: 325 TABLET ORAL at 10:10:00

## 2017-01-01 RX ADMIN — DIPHENHYDRAMINE HYDROCHLORIDE 50 MG: 50 INJECTION INTRAMUSCULAR; INTRAVENOUS at 11:17:00

## 2017-01-01 RX ADMIN — METHYLPREDNISOLONE SODIUM SUCCINATE 100 MG: 125 INJECTION, POWDER, LYOPHILIZED, FOR SOLUTION INTRAMUSCULAR; INTRAVENOUS at 10:30:00

## 2017-01-01 RX ADMIN — ACETAMINOPHEN 650 MG: 325 TABLET ORAL at 10:20:00

## 2017-01-01 RX ADMIN — METHYLPREDNISOLONE SODIUM SUCCINATE 100 MG: 125 INJECTION, POWDER, LYOPHILIZED, FOR SOLUTION INTRAMUSCULAR; INTRAVENOUS at 11:20:00

## 2017-01-01 RX ADMIN — DIPHENHYDRAMINE HCL 25 MG: 25 MG CAPSULE ORAL at 09:30:00

## 2017-01-04 ENCOUNTER — TELEPHONE (OUTPATIENT)
Dept: FAMILY MEDICINE CLINIC | Facility: CLINIC | Age: 62
End: 2017-01-04

## 2017-01-04 NOTE — TELEPHONE ENCOUNTER
Helen Avalos states that medicare is denying pt immunizations stating that it is a routine preventive health.  Jo also states that [employer] gap insurance denied coverage as well due to medicare's denial. Helen Avalos is requesting a letter form Dr. Gabi Beltran stating why t

## 2017-01-04 NOTE — TELEPHONE ENCOUNTER
Per pt: the immunizations were given on 11/18/2016 in [facility] office: HIB, IPV, MMR, Tdap.   Per Kayla Altman, pt was [employer] previously and that is why the immunizations were covered and since switching to medicare, immunizations are not covered  Jo gleason and stephanie

## 2017-01-08 NOTE — PROGRESS NOTES
[facility] Progress Note      Patient Name: Lucía Reynoso   YOB: 1955  Medical Record Number: [id number]  Attending Physician: Sarahi Kennedy M.D.      Date of Visit: 1/12/2017      Chief Complaint  Multiple myeloma, light chain la maintenance lenolidomide 5 mg daily x 21 days every 28 days. Immediately liver function increased but initially mildly. However, they then increased significantly. Hepatitis B viral load was checked and found to be lower than before lenolidomide.  As a resu Oral Tab Take 1 tablet (400 mg total) by mouth 2 (two) times daily. Disp: 60 tablet Rfl: 3   Zoledronic Acid (ZOMETA IV) Inject 3 mg into the vein every 28 days. Disp:  Rfl:    Calcium Citrate-Vitamin D (CALCIUM CITRATE + D3 OR) Take by mouth.  Disp:  Rfl: discussions today.     Laboratory    Recent Results (from the past 336 hour(s))  -COMP METABOLIC PANEL (14)   Collection Time: 01/12/17 11:14 AM   Result Value Ref Range   Glucose 88 70-99 mg/dL   BUN 23 (H) 8-20 mg/dL   Creatinine 1.39 (H) 0.70-1.30 mg/dL monoclonal protein testing could be a useful quantitative exam.    IgA and IgM proteins appear suppressed. Reviewed by Velvet Dias M.D. Pathology 01/16/2017 at 3:05 PM      Kappa Free Light Chain 0.755 0.330-1.940 mg/dL   Lambda Free Light Chain 10. 3

## 2017-01-12 ENCOUNTER — OFFICE VISIT (OUTPATIENT)
Dept: HEMATOLOGY/ONCOLOGY | Facility: HOSPITAL | Age: 62
End: 2017-01-12
Attending: SPECIALIST
Payer: MEDICARE

## 2017-01-12 VITALS
SYSTOLIC BLOOD PRESSURE: 136 MMHG | DIASTOLIC BLOOD PRESSURE: 83 MMHG | OXYGEN SATURATION: 99 % | WEIGHT: 172.19 LBS | BODY MASS INDEX: 25.21 KG/M2 | HEART RATE: 85 BPM | TEMPERATURE: 98 F | HEIGHT: 69.29 IN | RESPIRATION RATE: 18 BRPM

## 2017-01-12 DIAGNOSIS — D89.89 LIGHT CHAIN DISEASE, LAMBDA TYPE (HCC): Primary | ICD-10-CM

## 2017-01-12 DIAGNOSIS — K21.9 GERD WITHOUT ESOPHAGITIS: ICD-10-CM

## 2017-01-12 DIAGNOSIS — D89.89 LIGHT CHAIN DISEASE, LAMBDA TYPE (HCC): ICD-10-CM

## 2017-01-12 DIAGNOSIS — C90.00 STAGE III MULTIPLE MYELOMA (HCC): Primary | ICD-10-CM

## 2017-01-12 DIAGNOSIS — B19.10 REACTIVATION OF HEPATITIS B VIRAL HEPATITIS: ICD-10-CM

## 2017-01-12 DIAGNOSIS — B18.1 CHRONIC VIRAL HEPATITIS B WITHOUT DELTA AGENT AND WITHOUT COMA (HCC): ICD-10-CM

## 2017-01-12 DIAGNOSIS — C90.02 MULTIPLE MYELOMA IN RELAPSE (HCC): ICD-10-CM

## 2017-01-12 LAB
ALBUMIN SERPL-MCNC: 3.3 G/DL (ref 3.5–4.8)
ALP LIVER SERPL-CCNC: 82 U/L (ref 45–117)
ALT SERPL-CCNC: 29 U/L (ref 17–63)
AST SERPL-CCNC: 23 U/L (ref 15–41)
BASOPHILS # BLD AUTO: 0.08 X10(3) UL (ref 0–0.1)
BASOPHILS NFR BLD AUTO: 0.9 %
BILIRUB SERPL-MCNC: 1.1 MG/DL (ref 0.1–2)
BUN BLD-MCNC: 23 MG/DL (ref 8–20)
CALCIUM BLD-MCNC: 8.6 MG/DL (ref 8.3–10.3)
CHLORIDE: 107 MMOL/L (ref 101–111)
CO2: 23 MMOL/L (ref 22–32)
CREAT BLD-MCNC: 1.39 MG/DL (ref 0.7–1.3)
EOSINOPHIL # BLD AUTO: 0.15 X10(3) UL (ref 0–0.3)
EOSINOPHIL NFR BLD AUTO: 1.7 %
ERYTHROCYTE [DISTWIDTH] IN BLOOD BY AUTOMATED COUNT: 15.2 % (ref 11.5–16)
GLUCOSE BLD-MCNC: 88 MG/DL (ref 70–99)
HCT VFR BLD AUTO: 31.6 % (ref 37–53)
HGB BLD-MCNC: 11.1 G/DL (ref 13–17)
IMMATURE GRANULOCYTE COUNT: 0.41 X10(3) UL (ref 0–1)
IMMATURE GRANULOCYTE RATIO %: 4.6 %
IMMUNOGLOBULIN A: 9.55 MG/DL (ref 70–312)
IMMUNOGLOBULIN G: 488 MG/DL (ref 791–1643)
IMMUNOGLOBULIN M: 9.5 MG/DL (ref 43–279)
LYMPHOCYTES # BLD AUTO: 1.12 X10(3) UL (ref 0.9–4)
LYMPHOCYTES NFR BLD AUTO: 12.5 %
M PROTEIN MFR SERPL ELPH: 6.2 G/DL (ref 6.1–8.3)
MCH RBC QN AUTO: 37.1 PG (ref 27–33.2)
MCHC RBC AUTO-ENTMCNC: 35.1 G/DL (ref 31–37)
MCV RBC AUTO: 105.7 FL (ref 80–99)
MONOCYTES # BLD AUTO: 1.27 X10(3) UL (ref 0.1–0.6)
MONOCYTES NFR BLD AUTO: 14.2 %
NEUTROPHIL ABS PRELIM: 5.91 X10 (3) UL (ref 1.3–6.7)
NEUTROPHILS # BLD AUTO: 5.91 X10(3) UL (ref 1.3–6.7)
NEUTROPHILS NFR BLD AUTO: 66.1 %
PLATELET # BLD AUTO: 143 10(3)UL (ref 150–450)
POTASSIUM SERPL-SCNC: 4 MMOL/L (ref 3.6–5.1)
RBC # BLD AUTO: 2.99 X10(6)UL (ref 4.3–5.7)
RED CELL DISTRIBUTION WIDTH-SD: 58.1 FL (ref 35.1–46.3)
SODIUM SERPL-SCNC: 140 MMOL/L (ref 136–144)
WBC # BLD AUTO: 8.9 X10(3) UL (ref 4–13)

## 2017-01-12 PROCEDURE — 96409 CHEMO IV PUSH SNGL DRUG: CPT

## 2017-01-12 PROCEDURE — 99215 OFFICE O/P EST HI 40 MIN: CPT | Performed by: SPECIALIST

## 2017-01-12 PROCEDURE — 96375 TX/PRO/DX INJ NEW DRUG ADDON: CPT

## 2017-01-12 RX ORDER — DEXAMETHASONE 4 MG/1
20 TABLET ORAL DAILY
Qty: 10 TABLET | Refills: 0 | Status: SHIPPED | OUTPATIENT
Start: 2017-01-12 | End: 2017-01-14

## 2017-01-12 NOTE — PROGRESS NOTES
Patient is here today for follow up with Dr. Osiel Palmer for multiple myeloma. Patient denies pain. Mild fatigue. Denies nausea, stated appetite is good. Medication list,medical history and toxicities were reviewed and updated. Post MD visit.  Patient escor

## 2017-01-12 NOTE — PROGRESS NOTES
Education Record    Learner:  Patient    Disease / Diagnosis:    Barriers / Limitations:  None   Comments:    Method:  Discussion   Comments:    General Topics:  Plan of care reviewed   Comments:    Outcome:  Shows understanding   Comments:

## 2017-01-13 ENCOUNTER — OFFICE VISIT (OUTPATIENT)
Dept: HEMATOLOGY/ONCOLOGY | Facility: HOSPITAL | Age: 62
End: 2017-01-13
Attending: SPECIALIST
Payer: MEDICARE

## 2017-01-13 VITALS
TEMPERATURE: 97 F | RESPIRATION RATE: 18 BRPM | WEIGHT: 175.5 LBS | SYSTOLIC BLOOD PRESSURE: 116 MMHG | BODY MASS INDEX: 25.7 KG/M2 | HEIGHT: 69.29 IN | HEART RATE: 82 BPM | DIASTOLIC BLOOD PRESSURE: 69 MMHG

## 2017-01-13 DIAGNOSIS — D89.89 LIGHT CHAIN DISEASE, LAMBDA TYPE (HCC): Primary | ICD-10-CM

## 2017-01-13 PROCEDURE — 96409 CHEMO IV PUSH SNGL DRUG: CPT

## 2017-01-13 PROCEDURE — 96375 TX/PRO/DX INJ NEW DRUG ADDON: CPT

## 2017-01-13 NOTE — PROGRESS NOTES
Education Record    Learner:  Patient    Disease / Diagnosis:Multiple Myeloma    Barriers / Limitations:  None   Comments:    Method:  Discussion   Comments:    General Topics:  Plan of care reviewed   Comments:    Outcome:  Shows understanding   Comments:

## 2017-01-16 LAB
A/G RATIO: 1.68
ALBUMIN, SERUM: 3.64 G/DL (ref 3.5–4.8)
ALPHA-1 GLOBULIN: 0.23 G/DL (ref 0.1–0.3)
ALPHA-2 GLOBULIN: 0.86 G/DL (ref 0.6–1)
BETA GLOBULIN: 0.61 G/DL (ref 0.7–1.2)
GAMMA GLOBULIN: 0.46 G/DL (ref 0.6–1.6)
KAPPA FREE LIGHT CHAIN: 0.76 MG/DL (ref 0.33–1.94)
KAPPA/LAMBDA FLC RATIO: 0.07 (ref 0.26–1.65)
LAMBDA FREE LIGHT CHAIN: 10.36 MG/DL (ref 0.57–2.63)
M-SPIKE 1: 0.13 G/DL (ref ?–0)
TOTAL PROTEIN,SERUM: 5.8 G/DL (ref 6.1–8.3)

## 2017-01-19 ENCOUNTER — OFFICE VISIT (OUTPATIENT)
Dept: HEMATOLOGY/ONCOLOGY | Facility: HOSPITAL | Age: 62
End: 2017-01-19
Attending: SPECIALIST
Payer: MEDICARE

## 2017-01-19 VITALS
TEMPERATURE: 98 F | DIASTOLIC BLOOD PRESSURE: 77 MMHG | WEIGHT: 173.5 LBS | BODY MASS INDEX: 25.41 KG/M2 | HEIGHT: 69.29 IN | OXYGEN SATURATION: 100 % | HEART RATE: 94 BPM | RESPIRATION RATE: 18 BRPM | SYSTOLIC BLOOD PRESSURE: 121 MMHG

## 2017-01-19 DIAGNOSIS — D89.89 LIGHT CHAIN DISEASE, LAMBDA TYPE (HCC): Primary | ICD-10-CM

## 2017-01-19 LAB
BAND %: 5 %
BASOPHIL % MANUAL: 0 %
BASOPHIL ABSOLUTE MANUAL: 0 X10(3) UL (ref 0–0.1)
EOSINOPHIL % MANUAL: 3 %
EOSINOPHIL ABSOLUTE MANUAL: 0.29 X10(3) UL (ref 0–0.3)
ERYTHROCYTE [DISTWIDTH] IN BLOOD BY AUTOMATED COUNT: 15.2 % (ref 11.5–16)
HCT VFR BLD AUTO: 32.1 % (ref 37–53)
HGB BLD-MCNC: 11.2 G/DL (ref 13–17)
LYMPHOCYTE % MANUAL: 10 %
LYMPHOCYTE ABSOLUTE MANUAL: 0.96 X10(3) UL (ref 0.9–4)
MCH RBC QN AUTO: 36.8 PG (ref 27–33.2)
MCHC RBC AUTO-ENTMCNC: 34.9 G/DL (ref 31–37)
MCV RBC AUTO: 105.6 FL (ref 80–99)
METAMYELOCYTE %: 2 %
METAMYELOCYTE ABSOLUTE MANUAL: 0.19 X10(3) UL (ref ?–0.01)
MONOCYTE % MANUAL: 11 %
MONOCYTE ABSOLUTE MANUAL: 1.06 X10(3) UL (ref 0.1–0.6)
NEUTROPHIL ABS PRELIM: 6.08 X10 (3) UL (ref 1.3–6.7)
NEUTROPHIL ABSOLUTE MANUAL: 7.1 X10(3) UL (ref 1.3–6.7)
NEUTROPHILS % MANUAL: 69 %
PLATELET # BLD AUTO: 64 10(3)UL (ref 150–450)
PLATELET MORPHOLOGY: NORMAL
RBC # BLD AUTO: 3.04 X10(6)UL (ref 4.3–5.7)
RED CELL DISTRIBUTION WIDTH-SD: 58.1 FL (ref 35.1–46.3)
TOTAL CELLS COUNTED: 100
WBC # BLD AUTO: 9.6 X10(3) UL (ref 4–13)

## 2017-01-19 PROCEDURE — 96375 TX/PRO/DX INJ NEW DRUG ADDON: CPT

## 2017-01-19 PROCEDURE — 85007 BL SMEAR W/DIFF WBC COUNT: CPT

## 2017-01-19 PROCEDURE — 96409 CHEMO IV PUSH SNGL DRUG: CPT

## 2017-01-19 PROCEDURE — 85025 COMPLETE CBC W/AUTO DIFF WBC: CPT

## 2017-01-19 PROCEDURE — 36415 COLL VENOUS BLD VENIPUNCTURE: CPT

## 2017-01-19 PROCEDURE — 85027 COMPLETE CBC AUTOMATED: CPT

## 2017-01-19 NOTE — PROGRESS NOTES
Education Record    Learner:  Patient    Disease / Diagnosis:    Barriers / Limitations:  None   Comments:    Method:  Brief focused   Comments:    General Topics:  Medication and Plan of care reviewed   Comments:    Outcome:  Shows understanding   Comment

## 2017-01-20 ENCOUNTER — OFFICE VISIT (OUTPATIENT)
Dept: HEMATOLOGY/ONCOLOGY | Facility: HOSPITAL | Age: 62
End: 2017-01-20
Attending: SPECIALIST
Payer: MEDICARE

## 2017-01-20 VITALS
BODY MASS INDEX: 26 KG/M2 | SYSTOLIC BLOOD PRESSURE: 136 MMHG | TEMPERATURE: 97 F | WEIGHT: 177 LBS | DIASTOLIC BLOOD PRESSURE: 67 MMHG | HEART RATE: 82 BPM | RESPIRATION RATE: 18 BRPM

## 2017-01-20 DIAGNOSIS — D89.89 LIGHT CHAIN DISEASE, LAMBDA TYPE (HCC): Primary | ICD-10-CM

## 2017-01-20 PROCEDURE — 96409 CHEMO IV PUSH SNGL DRUG: CPT

## 2017-01-23 PROBLEM — K21.9 GERD WITHOUT ESOPHAGITIS: Status: ACTIVE | Noted: 2017-01-23

## 2017-01-26 ENCOUNTER — OFFICE VISIT (OUTPATIENT)
Dept: HEMATOLOGY/ONCOLOGY | Facility: HOSPITAL | Age: 62
End: 2017-01-26
Attending: SPECIALIST
Payer: MEDICARE

## 2017-01-26 VITALS
OXYGEN SATURATION: 100 % | WEIGHT: 171.5 LBS | BODY MASS INDEX: 25.11 KG/M2 | SYSTOLIC BLOOD PRESSURE: 126 MMHG | HEIGHT: 69.29 IN | HEART RATE: 101 BPM | DIASTOLIC BLOOD PRESSURE: 80 MMHG | RESPIRATION RATE: 18 BRPM | TEMPERATURE: 97 F

## 2017-01-26 DIAGNOSIS — D89.89 LIGHT CHAIN DISEASE, LAMBDA TYPE (HCC): Primary | ICD-10-CM

## 2017-01-26 LAB
BASOPHILS # BLD AUTO: 0.03 X10(3) UL (ref 0–0.1)
BASOPHILS NFR BLD AUTO: 0.4 %
EOSINOPHIL # BLD AUTO: 0.33 X10(3) UL (ref 0–0.3)
EOSINOPHIL NFR BLD AUTO: 4.2 %
ERYTHROCYTE [DISTWIDTH] IN BLOOD BY AUTOMATED COUNT: 15.3 % (ref 11.5–16)
HCT VFR BLD AUTO: 30.8 % (ref 37–53)
HGB BLD-MCNC: 10.6 G/DL (ref 13–17)
IMMATURE GRANULOCYTE COUNT: 0.19 X10(3) UL (ref 0–1)
IMMATURE GRANULOCYTE RATIO %: 2.4 %
LYMPHOCYTES # BLD AUTO: 0.81 X10(3) UL (ref 0.9–4)
LYMPHOCYTES NFR BLD AUTO: 10.3 %
MCH RBC QN AUTO: 37.2 PG (ref 27–33.2)
MCHC RBC AUTO-ENTMCNC: 34.4 G/DL (ref 31–37)
MCV RBC AUTO: 108.1 FL (ref 80–99)
MONOCYTES # BLD AUTO: 1.05 X10(3) UL (ref 0.1–0.6)
MONOCYTES NFR BLD AUTO: 13.3 %
NEUTROPHIL ABS PRELIM: 5.46 X10 (3) UL (ref 1.3–6.7)
NEUTROPHILS # BLD AUTO: 5.46 X10(3) UL (ref 1.3–6.7)
NEUTROPHILS NFR BLD AUTO: 69.4 %
PLATELET # BLD AUTO: 70 10(3)UL (ref 150–450)
RBC # BLD AUTO: 2.85 X10(6)UL (ref 4.3–5.7)
RED CELL DISTRIBUTION WIDTH-SD: 59.7 FL (ref 35.1–46.3)
WBC # BLD AUTO: 7.9 X10(3) UL (ref 4–13)

## 2017-01-26 PROCEDURE — 36415 COLL VENOUS BLD VENIPUNCTURE: CPT

## 2017-01-26 PROCEDURE — 85025 COMPLETE CBC W/AUTO DIFF WBC: CPT

## 2017-01-26 PROCEDURE — 96375 TX/PRO/DX INJ NEW DRUG ADDON: CPT

## 2017-01-26 PROCEDURE — 96409 CHEMO IV PUSH SNGL DRUG: CPT

## 2017-01-26 NOTE — PROGRESS NOTES
Education Record    Learner:  Patient    Disease / Diagnosis: Multiple Myeloma    Barriers / Limitations:  None   Comments:    Method:  Brief focused and Reinforcement   Comments:    General Topics:  Plan of care reviewed   Comments:    Outcome:  Shows und

## 2017-01-27 ENCOUNTER — OFFICE VISIT (OUTPATIENT)
Dept: HEMATOLOGY/ONCOLOGY | Facility: HOSPITAL | Age: 62
End: 2017-01-27
Attending: SPECIALIST
Payer: MEDICARE

## 2017-01-27 VITALS
TEMPERATURE: 96 F | SYSTOLIC BLOOD PRESSURE: 134 MMHG | DIASTOLIC BLOOD PRESSURE: 67 MMHG | HEART RATE: 71 BPM | RESPIRATION RATE: 18 BRPM

## 2017-01-27 DIAGNOSIS — D89.89 LIGHT CHAIN DISEASE, LAMBDA TYPE (HCC): Primary | ICD-10-CM

## 2017-01-27 PROCEDURE — 96409 CHEMO IV PUSH SNGL DRUG: CPT

## 2017-01-27 PROCEDURE — 96375 TX/PRO/DX INJ NEW DRUG ADDON: CPT

## 2017-01-27 NOTE — PROGRESS NOTES
Education Record    Learner:  Patient    Disease / Diagnosis:Multiple myeloma    Barriers / Limitations:  None   Comments:    Method:  Discussion   Comments:    General Topics:  Medication, Side effects and symptom management, Plan of care [facility]

## 2017-01-31 ENCOUNTER — SNF/IP PROF CHARGE ONLY (OUTPATIENT)
Dept: HEMATOLOGY/ONCOLOGY | Facility: HOSPITAL | Age: 62
End: 2017-01-31

## 2017-01-31 DIAGNOSIS — C90.02 MULTIPLE MYELOMA IN RELAPSE (HCC): Primary | ICD-10-CM

## 2017-01-31 PROCEDURE — G9678 ONCOLOGY CARE MODEL SERVICE: HCPCS | Performed by: SPECIALIST

## 2017-02-09 ENCOUNTER — OFFICE VISIT (OUTPATIENT)
Dept: HEMATOLOGY/ONCOLOGY | Facility: HOSPITAL | Age: 62
End: 2017-02-09
Attending: SPECIALIST
Payer: MEDICARE

## 2017-02-09 VITALS
HEART RATE: 105 BPM | RESPIRATION RATE: 18 BRPM | BODY MASS INDEX: 24.69 KG/M2 | OXYGEN SATURATION: 99 % | SYSTOLIC BLOOD PRESSURE: 130 MMHG | DIASTOLIC BLOOD PRESSURE: 81 MMHG | TEMPERATURE: 97 F | WEIGHT: 168.63 LBS | HEIGHT: 69.29 IN

## 2017-02-09 DIAGNOSIS — C90.02 MULTIPLE MYELOMA IN RELAPSE (HCC): ICD-10-CM

## 2017-02-09 DIAGNOSIS — C90.00 STAGE III MULTIPLE MYELOMA (HCC): Primary | ICD-10-CM

## 2017-02-09 DIAGNOSIS — K21.9 GERD WITHOUT ESOPHAGITIS: ICD-10-CM

## 2017-02-09 DIAGNOSIS — B19.10 REACTIVATION OF HEPATITIS B VIRAL HEPATITIS: ICD-10-CM

## 2017-02-09 DIAGNOSIS — D89.89 LIGHT CHAIN DISEASE, LAMBDA TYPE (HCC): Primary | ICD-10-CM

## 2017-02-09 DIAGNOSIS — D89.89 LIGHT CHAIN DISEASE, LAMBDA TYPE (HCC): ICD-10-CM

## 2017-02-09 LAB
ALBUMIN SERPL-MCNC: 3.5 G/DL (ref 3.5–4.8)
ALP LIVER SERPL-CCNC: 76 U/L (ref 45–117)
ALT SERPL-CCNC: 28 U/L (ref 17–63)
AST SERPL-CCNC: 20 U/L (ref 15–41)
BASOPHILS # BLD AUTO: 0.02 X10(3) UL (ref 0–0.1)
BASOPHILS NFR BLD AUTO: 0.2 %
BILIRUB SERPL-MCNC: 1.3 MG/DL (ref 0.1–2)
BUN BLD-MCNC: 28 MG/DL (ref 8–20)
CALCIUM BLD-MCNC: 9 MG/DL (ref 8.3–10.3)
CHLORIDE: 105 MMOL/L (ref 101–111)
CO2: 19 MMOL/L (ref 22–32)
CREAT BLD-MCNC: 1.43 MG/DL (ref 0.7–1.3)
EOSINOPHIL # BLD AUTO: 0.3 X10(3) UL (ref 0–0.3)
EOSINOPHIL NFR BLD AUTO: 3.2 %
ERYTHROCYTE [DISTWIDTH] IN BLOOD BY AUTOMATED COUNT: 14.9 % (ref 11.5–16)
GLUCOSE BLD-MCNC: 100 MG/DL (ref 70–99)
HCT VFR BLD AUTO: 33.6 % (ref 37–53)
HGB BLD-MCNC: 11.2 G/DL (ref 13–17)
IMMATURE GRANULOCYTE COUNT: 0.25 X10(3) UL (ref 0–1)
IMMATURE GRANULOCYTE RATIO %: 2.7 %
LYMPHOCYTES # BLD AUTO: 0.92 X10(3) UL (ref 0.9–4)
LYMPHOCYTES NFR BLD AUTO: 9.9 %
M PROTEIN MFR SERPL ELPH: 6.9 G/DL (ref 6.1–8.3)
MCH RBC QN AUTO: 36.1 PG (ref 27–33.2)
MCHC RBC AUTO-ENTMCNC: 33.3 G/DL (ref 31–37)
MCV RBC AUTO: 108.4 FL (ref 80–99)
MONOCYTES # BLD AUTO: 1.22 X10(3) UL (ref 0.1–0.6)
MONOCYTES NFR BLD AUTO: 13.2 %
NEUTROPHIL ABS PRELIM: 6.54 X10 (3) UL (ref 1.3–6.7)
NEUTROPHILS # BLD AUTO: 6.54 X10(3) UL (ref 1.3–6.7)
NEUTROPHILS NFR BLD AUTO: 70.8 %
PLATELET # BLD AUTO: 165 10(3)UL (ref 150–450)
POTASSIUM SERPL-SCNC: 3.9 MMOL/L (ref 3.6–5.1)
RBC # BLD AUTO: 3.1 X10(6)UL (ref 4.3–5.7)
RED CELL DISTRIBUTION WIDTH-SD: 59.6 FL (ref 35.1–46.3)
SODIUM SERPL-SCNC: 137 MMOL/L (ref 136–144)
WBC # BLD AUTO: 9.3 X10(3) UL (ref 4–13)

## 2017-02-09 PROCEDURE — 96413 CHEMO IV INFUSION 1 HR: CPT

## 2017-02-09 PROCEDURE — 99215 OFFICE O/P EST HI 40 MIN: CPT | Performed by: SPECIALIST

## 2017-02-09 PROCEDURE — 96375 TX/PRO/DX INJ NEW DRUG ADDON: CPT

## 2017-02-09 PROCEDURE — 96361 HYDRATE IV INFUSION ADD-ON: CPT

## 2017-02-09 RX ORDER — DEXAMETHASONE 4 MG/1
4 TABLET ORAL
Qty: 10 TABLET | Refills: 0 | Status: SHIPPED | OUTPATIENT
Start: 2017-02-09 | End: 2017-03-09

## 2017-02-09 RX ORDER — DEXAMETHASONE 4 MG/1
4 TABLET ORAL
COMMUNITY
End: 2017-02-09 | Stop reason: CLARIF

## 2017-02-09 RX ORDER — DEXAMETHASONE 4 MG/1
4 TABLET ORAL
COMMUNITY
End: 2017-02-09

## 2017-02-09 NOTE — PROGRESS NOTES
[facility] Progress Note      Patient Name: Steven Rosario   YOB: 1955  Medical Record Number: [id number]  Attending Physician: Dayne Mccoy M.D.      Date of Visit: 2/9/2017      Chief Complaint  Multiple myeloma, light chain lloyd maintenance lenolidomide 5 mg daily x 21 days every 28 days. Immediately liver function increased but initially mildly. However, they then increased significantly. Hepatitis B viral load was checked and found to be lower than before lenolidomide.  As a resu tablet (4 mg total) by mouth daily with breakfast. Takes 20mg daily on 3/2 and 3/3.  Disp: 10 tablet Rfl: 0   PANTOPRAZOLE SODIUM 40 MG Oral Tab EC TAKE 1 TABLET(40 MG) BY MOUTH EVERY MORNING BEFORE BREAKFAST Disp: 30 tablet Rfl: 0   acyclovir 400 MG Oral T auscultation bilaterally. Cardiovascular Regular rate and rhythm; normal S1S2. Abdomen Soft; nontender; no masses. Extremities No lower extremity edema. Skin Skin is warm and dry.    Neurologic Alert and oriented x 3; motor and sensory grossly intact Prophylaxis: Continue acyclovir 400 mg bid. 3.   Reactivation of hepatitis B: LFTs stable. Patient continues to follow with hepatology. He remains on entecavir. 4.   GERD: Patient to use pantoprazole when taking dexamethasone.      Planned Follow Up

## 2017-02-09 NOTE — PROGRESS NOTES
Pt arrived for md/fu and chemo, pt denies any new adverse S/S except for some slight fatigue, pt alert and appears in nad currently, pt ambulates with steady gait, f/u appts made    Education Record    Learner:  Patient    Disease / Jose Ramírez

## 2017-02-09 NOTE — PROGRESS NOTES
Patient is here today for follow up with Nathan Claros for Multiple Myeloma. Patient denies pain. Stated mild fatigue. Appetite is good. Medication list, medical history and toxicities were reviewed and updated.       Education Record    Learner:  Patient    D

## 2017-02-10 ENCOUNTER — OFFICE VISIT (OUTPATIENT)
Dept: HEMATOLOGY/ONCOLOGY | Facility: HOSPITAL | Age: 62
End: 2017-02-10
Attending: SPECIALIST
Payer: MEDICARE

## 2017-02-10 VITALS
SYSTOLIC BLOOD PRESSURE: 111 MMHG | OXYGEN SATURATION: 96 % | BODY MASS INDEX: 24.99 KG/M2 | RESPIRATION RATE: 18 BRPM | DIASTOLIC BLOOD PRESSURE: 52 MMHG | HEART RATE: 96 BPM | TEMPERATURE: 98 F | WEIGHT: 170.63 LBS | HEIGHT: 69.29 IN

## 2017-02-10 DIAGNOSIS — D89.89 LIGHT CHAIN DISEASE, LAMBDA TYPE (HCC): Primary | ICD-10-CM

## 2017-02-10 PROCEDURE — 96375 TX/PRO/DX INJ NEW DRUG ADDON: CPT

## 2017-02-10 PROCEDURE — 96413 CHEMO IV INFUSION 1 HR: CPT

## 2017-02-10 PROCEDURE — 96360 HYDRATION IV INFUSION INIT: CPT

## 2017-02-10 NOTE — PROGRESS NOTES
Education Record    Learner:  Patient    Disease / Diagnosis:  myeloma    Barriers / Limitations:  None   Comments:    Method:  Brief focused   Comments:    General Topics:  Medication, Procedure and Plan of care reviewed   Comments:    Outcome:  Shows und

## 2017-02-16 ENCOUNTER — OFFICE VISIT (OUTPATIENT)
Dept: HEMATOLOGY/ONCOLOGY | Facility: HOSPITAL | Age: 62
End: 2017-02-16
Attending: SPECIALIST
Payer: MEDICARE

## 2017-02-16 VITALS
HEIGHT: 69.29 IN | SYSTOLIC BLOOD PRESSURE: 130 MMHG | DIASTOLIC BLOOD PRESSURE: 89 MMHG | TEMPERATURE: 98 F | RESPIRATION RATE: 18 BRPM | BODY MASS INDEX: 24.63 KG/M2 | WEIGHT: 168.19 LBS | HEART RATE: 99 BPM | OXYGEN SATURATION: 98 %

## 2017-02-16 DIAGNOSIS — D89.89 LIGHT CHAIN DISEASE, LAMBDA TYPE (HCC): Primary | ICD-10-CM

## 2017-02-16 LAB
BAND %: 4 %
BASOPHIL % MANUAL: 0 %
BASOPHIL ABSOLUTE MANUAL: 0 X10(3) UL (ref 0–0.1)
EOSINOPHIL % MANUAL: 7 %
EOSINOPHIL ABSOLUTE MANUAL: 0.6 X10(3) UL (ref 0–0.3)
ERYTHROCYTE [DISTWIDTH] IN BLOOD BY AUTOMATED COUNT: 15.1 % (ref 11.5–16)
HCT VFR BLD AUTO: 33 % (ref 37–53)
HGB BLD-MCNC: 11.2 G/DL (ref 13–17)
LYMPHOCYTE % MANUAL: 5 %
LYMPHOCYTE ABSOLUTE MANUAL: 0.43 X10(3) UL (ref 0.9–4)
MCH RBC QN AUTO: 36.8 PG (ref 27–33.2)
MCHC RBC AUTO-ENTMCNC: 33.9 G/DL (ref 31–37)
MCV RBC AUTO: 108.6 FL (ref 80–99)
METAMYELOCYTE %: 2 %
METAMYELOCYTE ABSOLUTE MANUAL: 0.17 X10(3) UL (ref ?–0.01)
MONOCYTE % MANUAL: 14 %
MONOCYTE ABSOLUTE MANUAL: 1.2 X10(3) UL (ref 0.1–0.6)
MYELOCYTE %: 2 %
MYELOCYTE ABSOLUTE MANUAL: 0.17 X10(3) UL (ref ?–0.01)
NEUTROPHIL ABS PRELIM: 5.61 X10 (3) UL (ref 1.3–6.7)
NEUTROPHIL ABSOLUTE MANUAL: 6.02 X10(3) UL (ref 1.3–6.7)
NEUTROPHILS % MANUAL: 66 %
PLATELET # BLD AUTO: 78 10(3)UL (ref 150–450)
RBC # BLD AUTO: 3.04 X10(6)UL (ref 4.3–5.7)
RED CELL DISTRIBUTION WIDTH-SD: 59.6 FL (ref 35.1–46.3)
TOTAL CELLS COUNTED: 100
WBC # BLD AUTO: 8.6 X10(3) UL (ref 4–13)

## 2017-02-16 PROCEDURE — 85007 BL SMEAR W/DIFF WBC COUNT: CPT

## 2017-02-16 PROCEDURE — 36415 COLL VENOUS BLD VENIPUNCTURE: CPT

## 2017-02-16 PROCEDURE — 96409 CHEMO IV PUSH SNGL DRUG: CPT

## 2017-02-16 PROCEDURE — 85027 COMPLETE CBC AUTOMATED: CPT

## 2017-02-16 PROCEDURE — 96367 TX/PROPH/DG ADDL SEQ IV INF: CPT

## 2017-02-16 PROCEDURE — 85025 COMPLETE CBC W/AUTO DIFF WBC: CPT

## 2017-02-16 NOTE — PROGRESS NOTES
Education Record    Learner:  Patient    Disease / Diagnosis:multiple myeloma    Barriers / Limitations:  None   Comments:    Method:  Reinforcement   Comments:    General Topics:  Medication, Precautions, Procedure, Side effects and symptom management and

## 2017-02-17 ENCOUNTER — OFFICE VISIT (OUTPATIENT)
Dept: HEMATOLOGY/ONCOLOGY | Facility: HOSPITAL | Age: 62
End: 2017-02-17
Attending: SPECIALIST
Payer: MEDICARE

## 2017-02-17 VITALS
SYSTOLIC BLOOD PRESSURE: 147 MMHG | TEMPERATURE: 98 F | DIASTOLIC BLOOD PRESSURE: 87 MMHG | WEIGHT: 172.19 LBS | HEART RATE: 85 BPM | BODY MASS INDEX: 25 KG/M2 | RESPIRATION RATE: 18 BRPM

## 2017-02-17 DIAGNOSIS — D89.89 LIGHT CHAIN DISEASE, LAMBDA TYPE (HCC): Primary | ICD-10-CM

## 2017-02-17 PROCEDURE — 96409 CHEMO IV PUSH SNGL DRUG: CPT

## 2017-02-17 PROCEDURE — 96375 TX/PRO/DX INJ NEW DRUG ADDON: CPT

## 2017-02-23 ENCOUNTER — OFFICE VISIT (OUTPATIENT)
Dept: HEMATOLOGY/ONCOLOGY | Facility: HOSPITAL | Age: 62
End: 2017-02-23
Attending: SPECIALIST
Payer: MEDICARE

## 2017-02-23 VITALS
SYSTOLIC BLOOD PRESSURE: 118 MMHG | OXYGEN SATURATION: 97 % | DIASTOLIC BLOOD PRESSURE: 81 MMHG | HEART RATE: 98 BPM | TEMPERATURE: 98 F | RESPIRATION RATE: 18 BRPM | WEIGHT: 171.63 LBS | HEIGHT: 69.29 IN | BODY MASS INDEX: 25.13 KG/M2

## 2017-02-23 DIAGNOSIS — D89.89 LIGHT CHAIN DISEASE, LAMBDA TYPE (HCC): Primary | ICD-10-CM

## 2017-02-23 DIAGNOSIS — C90.00 STAGE III MULTIPLE MYELOMA (HCC): ICD-10-CM

## 2017-02-23 LAB
ALBUMIN SERPL-MCNC: 3.4 G/DL (ref 3.5–4.8)
ALP LIVER SERPL-CCNC: 83 U/L (ref 45–117)
ALT SERPL-CCNC: 29 U/L (ref 17–63)
AST SERPL-CCNC: 21 U/L (ref 15–41)
BASOPHILS # BLD AUTO: 0.04 X10(3) UL (ref 0–0.1)
BASOPHILS NFR BLD AUTO: 0.5 %
BILIRUB SERPL-MCNC: 1.2 MG/DL (ref 0.1–2)
BUN BLD-MCNC: 26 MG/DL (ref 8–20)
CALCIUM BLD-MCNC: 9.5 MG/DL (ref 8.3–10.3)
CHLORIDE: 107 MMOL/L (ref 101–111)
CO2: 21 MMOL/L (ref 22–32)
CREAT BLD-MCNC: 1.44 MG/DL (ref 0.7–1.3)
EOSINOPHIL # BLD AUTO: 0.36 X10(3) UL (ref 0–0.3)
EOSINOPHIL NFR BLD AUTO: 4.1 %
ERYTHROCYTE [DISTWIDTH] IN BLOOD BY AUTOMATED COUNT: 15.2 % (ref 11.5–16)
GLUCOSE BLD-MCNC: 92 MG/DL (ref 70–99)
HCT VFR BLD AUTO: 30.8 % (ref 37–53)
HGB BLD-MCNC: 10.6 G/DL (ref 13–17)
IMMATURE GRANULOCYTE COUNT: 0.35 X10(3) UL (ref 0–1)
IMMATURE GRANULOCYTE RATIO %: 4 %
LYMPHOCYTES # BLD AUTO: 1.04 X10(3) UL (ref 0.9–4)
LYMPHOCYTES NFR BLD AUTO: 11.8 %
M PROTEIN MFR SERPL ELPH: 6.7 G/DL (ref 6.1–8.3)
MCH RBC QN AUTO: 36.6 PG (ref 27–33.2)
MCHC RBC AUTO-ENTMCNC: 34.4 G/DL (ref 31–37)
MCV RBC AUTO: 106.2 FL (ref 80–99)
MONOCYTES # BLD AUTO: 1.29 X10(3) UL (ref 0.1–0.6)
MONOCYTES NFR BLD AUTO: 14.7 %
NEUTROPHIL ABS PRELIM: 5.72 X10 (3) UL (ref 1.3–6.7)
NEUTROPHILS # BLD AUTO: 5.72 X10(3) UL (ref 1.3–6.7)
NEUTROPHILS NFR BLD AUTO: 64.9 %
PLATELET # BLD AUTO: 88 10(3)UL (ref 150–450)
POTASSIUM SERPL-SCNC: 4.2 MMOL/L (ref 3.6–5.1)
RBC # BLD AUTO: 2.9 X10(6)UL (ref 4.3–5.7)
RED CELL DISTRIBUTION WIDTH-SD: 58.1 FL (ref 35.1–46.3)
SODIUM SERPL-SCNC: 137 MMOL/L (ref 136–144)
WBC # BLD AUTO: 8.8 X10(3) UL (ref 4–13)

## 2017-02-23 PROCEDURE — 85025 COMPLETE CBC W/AUTO DIFF WBC: CPT

## 2017-02-23 PROCEDURE — 36415 COLL VENOUS BLD VENIPUNCTURE: CPT

## 2017-02-23 PROCEDURE — 80053 COMPREHEN METABOLIC PANEL: CPT

## 2017-02-23 PROCEDURE — 96409 CHEMO IV PUSH SNGL DRUG: CPT

## 2017-02-24 ENCOUNTER — OFFICE VISIT (OUTPATIENT)
Dept: HEMATOLOGY/ONCOLOGY | Facility: HOSPITAL | Age: 62
End: 2017-02-24
Attending: SPECIALIST
Payer: MEDICARE

## 2017-02-24 VITALS
HEIGHT: 69.29 IN | WEIGHT: 175 LBS | DIASTOLIC BLOOD PRESSURE: 78 MMHG | TEMPERATURE: 97 F | SYSTOLIC BLOOD PRESSURE: 134 MMHG | BODY MASS INDEX: 25.63 KG/M2 | HEART RATE: 81 BPM | RESPIRATION RATE: 18 BRPM

## 2017-02-24 DIAGNOSIS — D89.89 LIGHT CHAIN DISEASE, LAMBDA TYPE (HCC): Primary | ICD-10-CM

## 2017-02-24 PROCEDURE — 96361 HYDRATE IV INFUSION ADD-ON: CPT

## 2017-02-24 PROCEDURE — 96413 CHEMO IV INFUSION 1 HR: CPT

## 2017-02-24 NOTE — PROGRESS NOTES
Education Record    Learner:  Patient    Disease / Diagnosis:  Multiple myeloma    Barriers / Limitations:  None   Comments:    Method:  Brief focused   Comments:    General Topics:  Medication, Procedure and Plan of care reviewed   Comments:    Outcome:

## 2017-02-28 ENCOUNTER — SNF/IP PROF CHARGE ONLY (OUTPATIENT)
Dept: HEMATOLOGY/ONCOLOGY | Facility: HOSPITAL | Age: 62
End: 2017-02-28

## 2017-02-28 DIAGNOSIS — C90.02 MULTIPLE MYELOMA IN RELAPSE (HCC): Primary | ICD-10-CM

## 2017-02-28 PROCEDURE — G9678 ONCOLOGY CARE MODEL SERVICE: HCPCS | Performed by: SPECIALIST

## 2017-03-07 NOTE — PROGRESS NOTES
[facility] Progress Note      Patient Name: Delonte Borges   YOB: 1955  Medical Record Number: [id number]  Attending Physician: Shania Fuller M.D.      Date of Visit: 3/9/2017      Chief Complaint  Multiple myeloma, light chain lloyd maintenance lenolidomide 5 mg daily x 21 days every 28 days. Immediately liver function increased but initially mildly. However, they then increased significantly. Hepatitis B viral load was checked and found to be lower than before lenolidomide.  As a resu (DECADRON) 4 MG tablet Take 1 tablet (4 mg total) by mouth daily with breakfast. Takes 20mg daily on 3/30 and 3/31.  Disp: 10 tablet Rfl: 0   Pantoprazole Sodium 40 MG Oral Tab EC TAKE 1 TABLET(40 MG) BY MOUTH EVERY MORNING BEFORE BREAKFAST Disp: 30 tablet respiratory distress; clear to auscultation bilaterally. Cardiovascular Regular rate and rhythm; normal S1S2. Abdomen Soft; nontender; no masses. Extremities No lower extremity edema. Skin Skin is warm and dry.    Neurologic Alert and oriented x 3; m Bilirubin, Total 1.0 0.1-2.0 mg/dL   Total Protein 6.7 6.1-8.3 g/dL   Albumin 3.4 (L) 3.5-4.8 g/dL   Sodium 140 136-144 mmol/L   Potassium 4.0 3.6-5.1 mmol/L   Chloride 111 101-111 mmol/L   CO2 18.0 (L) 22.0-32.0 mmol/L   -CBC W/ DIFFERENTIAL   Collectio - multiple myeloma on treatment. Electronically signed by:    Renu Winn M.D.   THE [facility]  Director, Bone and Soft Tissue Sarcoma Program  Section of Hematology/Oncology, [facility]

## 2017-03-09 ENCOUNTER — OFFICE VISIT (OUTPATIENT)
Dept: HEMATOLOGY/ONCOLOGY | Facility: HOSPITAL | Age: 62
End: 2017-03-09
Attending: SPECIALIST
Payer: MEDICARE

## 2017-03-09 VITALS
DIASTOLIC BLOOD PRESSURE: 82 MMHG | HEART RATE: 103 BPM | HEIGHT: 69.02 IN | OXYGEN SATURATION: 97 % | WEIGHT: 186 LBS | BODY MASS INDEX: 27.55 KG/M2 | SYSTOLIC BLOOD PRESSURE: 128 MMHG | RESPIRATION RATE: 18 BRPM | TEMPERATURE: 98 F

## 2017-03-09 DIAGNOSIS — C90.00 STAGE III MULTIPLE MYELOMA (HCC): ICD-10-CM

## 2017-03-09 DIAGNOSIS — K21.9 GERD WITHOUT ESOPHAGITIS: ICD-10-CM

## 2017-03-09 DIAGNOSIS — B19.10 REACTIVATION OF HEPATITIS B VIRAL HEPATITIS: ICD-10-CM

## 2017-03-09 DIAGNOSIS — C90.02 MULTIPLE MYELOMA IN RELAPSE (HCC): ICD-10-CM

## 2017-03-09 DIAGNOSIS — D89.89 LIGHT CHAIN DISEASE, LAMBDA TYPE (HCC): Primary | ICD-10-CM

## 2017-03-09 LAB
ALBUMIN SERPL-MCNC: 3.4 G/DL (ref 3.5–4.8)
ALP LIVER SERPL-CCNC: 82 U/L (ref 45–117)
ALT SERPL-CCNC: 31 U/L (ref 17–63)
AST SERPL-CCNC: 19 U/L (ref 15–41)
BASOPHILS # BLD AUTO: 0.03 X10(3) UL (ref 0–0.1)
BASOPHILS NFR BLD AUTO: 0.4 %
BILIRUB SERPL-MCNC: 1 MG/DL (ref 0.1–2)
BUN BLD-MCNC: 26 MG/DL (ref 8–20)
CALCIUM BLD-MCNC: 8.7 MG/DL (ref 8.3–10.3)
CHLORIDE: 111 MMOL/L (ref 101–111)
CO2: 18 MMOL/L (ref 22–32)
CREAT BLD-MCNC: 1.38 MG/DL (ref 0.7–1.3)
EOSINOPHIL # BLD AUTO: 0.25 X10(3) UL (ref 0–0.3)
EOSINOPHIL NFR BLD AUTO: 2.9 %
ERYTHROCYTE [DISTWIDTH] IN BLOOD BY AUTOMATED COUNT: 15 % (ref 11.5–16)
GLUCOSE BLD-MCNC: 87 MG/DL (ref 70–99)
HCT VFR BLD AUTO: 34.1 % (ref 37–53)
HGB BLD-MCNC: 11.4 G/DL (ref 13–17)
IMMATURE GRANULOCYTE COUNT: 0.34 X10(3) UL (ref 0–1)
IMMATURE GRANULOCYTE RATIO %: 4 %
IMMUNOGLOBULIN A: <7.83 MG/DL (ref 70–312)
IMMUNOGLOBULIN G: 435 MG/DL (ref 791–1643)
IMMUNOGLOBULIN M: 6.9 MG/DL (ref 43–279)
LYMPHOCYTES # BLD AUTO: 1.11 X10(3) UL (ref 0.9–4)
LYMPHOCYTES NFR BLD AUTO: 13 %
M PROTEIN MFR SERPL ELPH: 6.7 G/DL (ref 6.1–8.3)
MCH RBC QN AUTO: 36.1 PG (ref 27–33.2)
MCHC RBC AUTO-ENTMCNC: 33.4 G/DL (ref 31–37)
MCV RBC AUTO: 107.9 FL (ref 80–99)
MONOCYTES # BLD AUTO: 1.17 X10(3) UL (ref 0.1–0.6)
MONOCYTES NFR BLD AUTO: 13.7 %
NEUTROPHIL ABS PRELIM: 5.62 X10 (3) UL (ref 1.3–6.7)
NEUTROPHILS # BLD AUTO: 5.62 X10(3) UL (ref 1.3–6.7)
NEUTROPHILS NFR BLD AUTO: 66 %
PLATELET # BLD AUTO: 142 10(3)UL (ref 150–450)
PLATELET MORPHOLOGY: NORMAL
POTASSIUM SERPL-SCNC: 4 MMOL/L (ref 3.6–5.1)
RBC # BLD AUTO: 3.16 X10(6)UL (ref 4.3–5.7)
RED CELL DISTRIBUTION WIDTH-SD: 59.6 FL (ref 35.1–46.3)
SODIUM SERPL-SCNC: 140 MMOL/L (ref 136–144)
WBC # BLD AUTO: 8.5 X10(3) UL (ref 4–13)

## 2017-03-09 PROCEDURE — 96409 CHEMO IV PUSH SNGL DRUG: CPT

## 2017-03-09 PROCEDURE — 99215 OFFICE O/P EST HI 40 MIN: CPT | Performed by: SPECIALIST

## 2017-03-09 RX ORDER — PANTOPRAZOLE SODIUM 40 MG/1
TABLET, DELAYED RELEASE ORAL
Qty: 30 TABLET | Refills: 5 | Status: SHIPPED | OUTPATIENT
Start: 2017-03-09 | End: 2017-01-01

## 2017-03-09 RX ORDER — DEXAMETHASONE 4 MG/1
4 TABLET ORAL
Qty: 10 TABLET | Refills: 0 | Status: SHIPPED | OUTPATIENT
Start: 2017-03-09 | End: 2017-01-01

## 2017-03-09 NOTE — PROGRESS NOTES
Education Record    Learner:  Patient    Disease / Diagnosis: MM    Barriers / Limitations:  None   Comments:    Method:  Discussion and Printed material   Comments:    General Topics:  Medication, Side effects and symptom management and Plan of care revie

## 2017-03-09 NOTE — PROGRESS NOTES
Patient here today for follow up with Dr. Jewels Hernandez. Patient denies pain. Medication list, medical history and toxicities were reviewed and updated. Post MD visit patient sent to waiting room. Treating RN notified. AVS provided and follow up reviewed.

## 2017-03-10 ENCOUNTER — OFFICE VISIT (OUTPATIENT)
Dept: HEMATOLOGY/ONCOLOGY | Facility: HOSPITAL | Age: 62
End: 2017-03-10
Attending: SPECIALIST
Payer: MEDICARE

## 2017-03-10 VITALS
HEIGHT: 69.02 IN | BODY MASS INDEX: 25.55 KG/M2 | RESPIRATION RATE: 18 BRPM | WEIGHT: 172.5 LBS | SYSTOLIC BLOOD PRESSURE: 122 MMHG | TEMPERATURE: 97 F | HEART RATE: 98 BPM | OXYGEN SATURATION: 98 % | DIASTOLIC BLOOD PRESSURE: 74 MMHG

## 2017-03-10 DIAGNOSIS — D89.89 LIGHT CHAIN DISEASE, LAMBDA TYPE (HCC): Primary | ICD-10-CM

## 2017-03-10 PROCEDURE — 96361 HYDRATE IV INFUSION ADD-ON: CPT

## 2017-03-10 PROCEDURE — 96409 CHEMO IV PUSH SNGL DRUG: CPT

## 2017-03-14 LAB
A/G RATIO: 1.81
ALBUMIN, SERUM: 3.99 G/DL (ref 3.5–4.8)
ALPHA-1 GLOBULIN: 0.22 G/DL (ref 0.1–0.3)
ALPHA-2 GLOBULIN: 1.02 G/DL (ref 0.6–1)
BETA GLOBULIN: 0.6 G/DL (ref 0.7–1.2)
GAMMA GLOBULIN: 0.37 G/DL (ref 0.6–1.6)
KAPPA FREE LIGHT CHAIN: 0.41 MG/DL (ref 0.33–1.94)
KAPPA/LAMBDA FLC RATIO: 0.05 (ref 0.26–1.65)
LAMBDA FREE LIGHT CHAIN: 8.38 MG/DL (ref 0.57–2.63)
TOTAL PROTEIN,SERUM: 6.2 G/DL (ref 6.1–8.3)

## 2017-03-15 ENCOUNTER — TELEPHONE (OUTPATIENT)
Dept: HEMATOLOGY/ONCOLOGY | Facility: HOSPITAL | Age: 62
End: 2017-03-15

## 2017-03-16 ENCOUNTER — OFFICE VISIT (OUTPATIENT)
Dept: HEMATOLOGY/ONCOLOGY | Facility: HOSPITAL | Age: 62
End: 2017-03-16
Attending: SPECIALIST
Payer: MEDICARE

## 2017-03-16 VITALS
OXYGEN SATURATION: 98 % | DIASTOLIC BLOOD PRESSURE: 77 MMHG | WEIGHT: 170 LBS | HEART RATE: 98 BPM | BODY MASS INDEX: 25.18 KG/M2 | TEMPERATURE: 98 F | HEIGHT: 69.02 IN | SYSTOLIC BLOOD PRESSURE: 109 MMHG | RESPIRATION RATE: 18 BRPM

## 2017-03-16 DIAGNOSIS — D89.89 LIGHT CHAIN DISEASE, LAMBDA TYPE (HCC): Primary | ICD-10-CM

## 2017-03-16 LAB
BAND %: 8 %
BASOPHIL % MANUAL: 1 %
BASOPHIL ABSOLUTE MANUAL: 0.09 X10(3) UL (ref 0–0.1)
EOSINOPHIL % MANUAL: 3 %
EOSINOPHIL ABSOLUTE MANUAL: 0.26 X10(3) UL (ref 0–0.3)
ERYTHROCYTE [DISTWIDTH] IN BLOOD BY AUTOMATED COUNT: 14.8 % (ref 11.5–16)
HCT VFR BLD AUTO: 32.4 % (ref 37–53)
HGB BLD-MCNC: 11.2 G/DL (ref 13–17)
LYMPHOCYTE % MANUAL: 12 %
LYMPHOCYTE ABSOLUTE MANUAL: 1.04 X10(3) UL (ref 0.9–4)
MCH RBC QN AUTO: 36.7 PG (ref 27–33.2)
MCHC RBC AUTO-ENTMCNC: 34.6 G/DL (ref 31–37)
MCV RBC AUTO: 106.2 FL (ref 80–99)
MONOCYTE % MANUAL: 11 %
MONOCYTE ABSOLUTE MANUAL: 0.96 X10(3) UL (ref 0.1–0.6)
MYELOCYTE %: 1 %
MYELOCYTE ABSOLUTE MANUAL: 0.09 X10(3) UL (ref ?–0.01)
NEUTROPHIL ABS PRELIM: 5.53 X10 (3) UL (ref 1.3–6.7)
NEUTROPHIL ABSOLUTE MANUAL: 6.26 X10(3) UL (ref 1.3–6.7)
NEUTROPHILS % MANUAL: 64 %
PLATELET # BLD AUTO: 56 10(3)UL (ref 150–450)
PLATELET MORPHOLOGY: NORMAL
RBC # BLD AUTO: 3.05 X10(6)UL (ref 4.3–5.7)
RED CELL DISTRIBUTION WIDTH-SD: 57.1 FL (ref 35.1–46.3)
TOTAL CELLS COUNTED: 100
WBC # BLD AUTO: 8.7 X10(3) UL (ref 4–13)

## 2017-03-16 PROCEDURE — 85007 BL SMEAR W/DIFF WBC COUNT: CPT

## 2017-03-16 PROCEDURE — 96409 CHEMO IV PUSH SNGL DRUG: CPT

## 2017-03-16 PROCEDURE — 36415 COLL VENOUS BLD VENIPUNCTURE: CPT

## 2017-03-16 PROCEDURE — 96374 THER/PROPH/DIAG INJ IV PUSH: CPT

## 2017-03-16 PROCEDURE — 96367 TX/PROPH/DG ADDL SEQ IV INF: CPT

## 2017-03-16 NOTE — PROGRESS NOTES
Education Record    Learner:  Patient    Disease / Diagnosis:    Barriers / Limitations:  None   Comments:    Method:  Discussion   Comments:    General Topics:  Medication, Precautions, Side effects and symptom management and Plan of care reviewed   Christelle

## 2017-03-17 ENCOUNTER — OFFICE VISIT (OUTPATIENT)
Dept: HEMATOLOGY/ONCOLOGY | Facility: HOSPITAL | Age: 62
End: 2017-03-17
Attending: SPECIALIST
Payer: MEDICARE

## 2017-03-17 VITALS
OXYGEN SATURATION: 97 % | BODY MASS INDEX: 26 KG/M2 | WEIGHT: 174.5 LBS | SYSTOLIC BLOOD PRESSURE: 150 MMHG | DIASTOLIC BLOOD PRESSURE: 79 MMHG | HEART RATE: 93 BPM | TEMPERATURE: 97 F | RESPIRATION RATE: 18 BRPM

## 2017-03-17 DIAGNOSIS — D89.89 LIGHT CHAIN DISEASE, LAMBDA TYPE (HCC): Primary | ICD-10-CM

## 2017-03-17 PROCEDURE — 96375 TX/PRO/DX INJ NEW DRUG ADDON: CPT

## 2017-03-17 PROCEDURE — 96409 CHEMO IV PUSH SNGL DRUG: CPT

## 2017-03-17 NOTE — PROGRESS NOTES
Pt arrived for chemo and states he has been having increased fatigue and difficulty falling and staying asleep, pt denies any other adverse S/S, pt alert and appears in nad, pt ambulates with steady gait,     Education Record    Learner:  Patient    Diseas

## 2017-03-23 ENCOUNTER — OFFICE VISIT (OUTPATIENT)
Dept: HEMATOLOGY/ONCOLOGY | Facility: HOSPITAL | Age: 62
End: 2017-03-23
Attending: SPECIALIST
Payer: MEDICARE

## 2017-03-23 VITALS
WEIGHT: 170.38 LBS | HEIGHT: 69.02 IN | OXYGEN SATURATION: 100 % | SYSTOLIC BLOOD PRESSURE: 122 MMHG | DIASTOLIC BLOOD PRESSURE: 82 MMHG | HEART RATE: 98 BPM | TEMPERATURE: 98 F | BODY MASS INDEX: 25.23 KG/M2 | RESPIRATION RATE: 18 BRPM

## 2017-03-23 DIAGNOSIS — D89.89 LIGHT CHAIN DISEASE, LAMBDA TYPE (HCC): Primary | ICD-10-CM

## 2017-03-23 LAB
BASOPHILS # BLD AUTO: 0.02 X10(3) UL (ref 0–0.1)
BASOPHILS NFR BLD AUTO: 0.3 %
EOSINOPHIL # BLD AUTO: 0.34 X10(3) UL (ref 0–0.3)
EOSINOPHIL NFR BLD AUTO: 4.4 %
ERYTHROCYTE [DISTWIDTH] IN BLOOD BY AUTOMATED COUNT: 15.1 % (ref 11.5–16)
HCT VFR BLD AUTO: 33.6 % (ref 37–53)
HGB BLD-MCNC: 11.4 G/DL (ref 13–17)
IMMATURE GRANULOCYTE COUNT: 0.39 X10(3) UL (ref 0–1)
IMMATURE GRANULOCYTE RATIO %: 5 %
LYMPHOCYTES # BLD AUTO: 1.02 X10(3) UL (ref 0.9–4)
LYMPHOCYTES NFR BLD AUTO: 13.1 %
MCH RBC QN AUTO: 36 PG (ref 27–33.2)
MCHC RBC AUTO-ENTMCNC: 33.9 G/DL (ref 31–37)
MCV RBC AUTO: 106 FL (ref 80–99)
MONOCYTES # BLD AUTO: 1.19 X10(3) UL (ref 0.1–0.6)
MONOCYTES NFR BLD AUTO: 15.3 %
NEUTROPHIL ABS PRELIM: 4.84 X10 (3) UL (ref 1.3–6.7)
NEUTROPHILS # BLD AUTO: 4.84 X10(3) UL (ref 1.3–6.7)
NEUTROPHILS NFR BLD AUTO: 61.9 %
PLATELET # BLD AUTO: 73 10(3)UL (ref 150–450)
RBC # BLD AUTO: 3.17 X10(6)UL (ref 4.3–5.7)
RED CELL DISTRIBUTION WIDTH-SD: 58.6 FL (ref 35.1–46.3)
WBC # BLD AUTO: 7.8 X10(3) UL (ref 4–13)

## 2017-03-23 PROCEDURE — 85025 COMPLETE CBC W/AUTO DIFF WBC: CPT

## 2017-03-23 PROCEDURE — 96409 CHEMO IV PUSH SNGL DRUG: CPT

## 2017-03-23 PROCEDURE — 36415 COLL VENOUS BLD VENIPUNCTURE: CPT

## 2017-03-23 PROCEDURE — 96375 TX/PRO/DX INJ NEW DRUG ADDON: CPT

## 2017-03-23 NOTE — PROGRESS NOTES
Education Record    Learner:  Patient    Disease / Diagnosis: Multiple Myeloma    Barriers / Limitations:  None   Comments:    Method:  Brief focused   Comments:    General Topics:  Plan of care reviewed   Comments:    Outcome:  Shows understanding   Iraida Garces

## 2017-03-24 ENCOUNTER — OFFICE VISIT (OUTPATIENT)
Dept: HEMATOLOGY/ONCOLOGY | Facility: HOSPITAL | Age: 62
End: 2017-03-24
Attending: SPECIALIST
Payer: MEDICARE

## 2017-03-24 VITALS
HEIGHT: 69.02 IN | RESPIRATION RATE: 18 BRPM | DIASTOLIC BLOOD PRESSURE: 86 MMHG | WEIGHT: 172.81 LBS | TEMPERATURE: 97 F | BODY MASS INDEX: 25.6 KG/M2 | SYSTOLIC BLOOD PRESSURE: 135 MMHG | HEART RATE: 76 BPM

## 2017-03-24 DIAGNOSIS — D89.89 LIGHT CHAIN DISEASE, LAMBDA TYPE (HCC): Primary | ICD-10-CM

## 2017-03-24 PROCEDURE — 96375 TX/PRO/DX INJ NEW DRUG ADDON: CPT

## 2017-03-24 PROCEDURE — 96409 CHEMO IV PUSH SNGL DRUG: CPT

## 2017-03-24 NOTE — PATIENT INSTRUCTIONS
Education Record    Learner:  Patient    Disease / Diagnosis:MULTIPLE MYELOMA    Barriers / Limitations:  None              Comments:    Method:  Brief focused and Reinforcement              Comments:    General Topics:  Medication, Side effects and sympto

## 2017-04-06 NOTE — PROGRESS NOTES
Education Record    Learner:  Patient    Disease / Diagnosis: MM    Barriers / Limitations:  None   Comments:    Method:  Discussion   Comments:    General Topics:  Medication, Side effects and symptom management and Plan of care reviewed   Comments:     Sameera Diaz

## 2017-04-06 NOTE — PROGRESS NOTES
Patient is here today for follow up with Amanda Jackson. Patient denies pain. Stated fatigue post treatment. Appetite is good. Nausea yesterday only - was out to eat - is better today. Medication list, medical history and toxicities were reviewed and updated.

## 2017-04-07 NOTE — TELEPHONE ENCOUNTER
MD Lindsey Holly, RN                     Let him know that his myeloma numbers are stable. Still in remission. Left detailed message on patient voicemail regarding results. Instructed to return call as needed.

## 2017-04-13 NOTE — PROGRESS NOTES
Education Record    Learner:  Patient    Disease / Diagnosis: MM    Barriers / Limitations:  None   Comments:    Method:  Discussion   Comments:    General Topics:  Medication, Side effects and symptom management and Plan of care reviewed   Comments:     Gianna Lewis

## 2017-04-14 NOTE — PROGRESS NOTES
Education Record    Learner:  Patient    Disease / Diagnosis:mutiple myeloma    Barriers / Limitations:  None   Comments:    Method:  Discussion   Comments:    General Topics:  Medication, Side effects and symptom management, Plan of care reviewed and Fall

## 2017-04-19 NOTE — PROGRESS NOTES
[facility] Progress Note      Patient Name: Sidra Solis   YOB: 1955  Medical Record Number: [id number]  Attending Physician: Bibiana Chavez M.D.      Date of Visit: 4/6/2017      Chief Complaint  Multiple myeloma, light chain lloyd maintenance lenolidomide 5 mg daily x 21 days every 28 days. Immediately liver function increased but initially mildly. However, they then increased significantly. Hepatitis B viral load was checked and found to be lower than before lenolidomide.  As a resu Medications     dexamethasone (DECADRON) 4 MG tablet Take 1 tablet (4 mg total) by mouth daily with breakfast. Takes 20mg daily on 5/4 and 5/5.  Disp: 10 tablet Rfl: 0   Pantoprazole Sodium 40 MG Oral Tab EC TAKE 1 TABLET(40 MG) BY MOUTH EVERY MORNING BEFOR Respiratory Normal effort; no respiratory distress; clear to auscultation bilaterally. Cardiovascular Regular rate and rhythm; normal S1S2. Abdomen Soft; nontender; no masses. Extremities No lower extremity edema. Skin Skin is warm and dry.    Mai Sequin Value Ref Range   Glucose 97 70-99 mg/dL   BUN 26 (H) 8-20 mg/dL   Creatinine 1.46 (H) 0.70-1.30 mg/dL   GFR 51 (L) >=60   Calcium, Total 9.3 8.3-10.3 mg/dL   Alkaline Phosphatase 90  U/L   AST 18 15-41 U/L   Alt 33 17-63 U/L   Bilirubin, Total 1.1 0 protocol. Risk Level: High - multiple myeloma on treatment. Electronically signed by:    Nat Bradshaw M.D.   [facility]  Director, Bone and Soft Tissue Sarcoma Program  Section of Hematology/Oncology, [address] [place] [address]

## 2017-04-20 NOTE — PROGRESS NOTES
Education Record    Learner:  Patient    Disease / Diagnosis: MM    Barriers / Limitations:  None   Comments:    Method:  Discussion   Comments:    General Topics:  Medication, Side effects and symptom management and Plan of care reviewed   Comments:     Wendi Marcano

## 2017-05-04 NOTE — PROGRESS NOTES
Patient is here today for follow up with Alireza Lanier for Multiple Myeloma. Patient is on Kyprolis therapy. Patient stated some fatigue. Has taste changes \"food is not tasting good\". Neuropathy hands and feet unchanged.  Medication list, medical history and

## 2017-05-05 NOTE — PROGRESS NOTES
[facility] Progress Note      Patient Name: Jayda Carrasquillo   YOB: 1955  Medical Record Number: [id number]  Attending Physician: Miles Babcock M.D.      Date of Visit: 5/4/2017      Chief Complaint  Multiple myeloma, light chain lloyd maintenance lenolidomide 5 mg daily x 21 days every 28 days. Immediately liver function increased but initially mildly. However, they then increased significantly. Hepatitis B viral load was checked and found to be lower than before lenolidomide.  As a resu but quit 1992; uses alcohol weekly; denies illicit drug use. Current Medications     dexamethasone (DECADRON) 4 MG tablet Take 1 tablet (4 mg total) by mouth daily with breakfast. Takes 20mg daily on 5/25 and 5/26.  Disp: 10 tablet Rfl: 0   Pantoprazol anicteric. ENMT External nose normal; external ears normal.  Neck Supple; no masses. Respiratory Normal effort; no respiratory distress; clear to auscultation bilaterally. Cardiovascular Regular rate and rhythm; normal S1S2.   Abdomen Soft; nontender; % 4.7 %       Impression and Plan   1. Multiple myleoma: Patient with relapsed disease after stem cell transplant. M protein has increased but less than 0.5 g/dl. Proceed with cycle 10.    2.   Prophylaxis: Continue acyclovir 400 mg bid.     3.   Reactiva

## 2017-05-05 NOTE — PROGRESS NOTES
Education Record    Learner:  Patient    Disease / Diagnosis: MM    Barriers / Limitations:  None   Comments:    Method:  Discussion   Comments:    General Topics:  Medication, Side effects and symptom management and Plan of care reviewed   Comments:     Cedric Lemus

## 2017-05-12 NOTE — PROGRESS NOTES
Education Record    Learner:  Patient    Disease / Diagnosis:multiple myeloma    Barriers / Limitations:  None   Comments:    Method:  Discussion   Comments:    General Topics:  Medication, Side effects and symptom management, Plan of care cesia and Desirae Hanson

## 2017-05-18 NOTE — PROGRESS NOTES
Education Record    Learner:  Patient    Disease / Diagnosis:multiple myeloma    Barriers / Limitations:  None   Comments:    Method:  Discussion and Reinforcement   Comments:    General Topics:  Diet, Infection, Medication, Precautions, Side effects and s

## 2017-05-19 NOTE — PROGRESS NOTES
Education Record    Learner:  Patient    Disease / Diagnosis:multiple myeloma    Barriers / Limitations:  None   Comments:    Method:  Discussion and Reinforcement   Comments:    General Topics:  Infection, Medication, Side effects and symptom management,

## 2017-06-01 NOTE — PROGRESS NOTES
Patient is here today for follow up with Sigrid PAIZ for multiple myeloma and chemotherapy Kyprolis. Patient denies pain. Stated decreased appetite due to taste changes. Denies nausea. Neuropathy increased in feet.  Stated occasionally looses his peter

## 2017-06-07 NOTE — PROGRESS NOTES
[facility]  [address], [address] [facility] [address]  [address]  [id number] ([phone]) tablet (0.5 mg total) by mouth daily. , Disp: 30 tablet, Rfl: 11  •  Lysine 1000 MG Oral Tab, Take 1 tablet by mouth daily. , Disp: , Rfl:   •  Multiple Vitamins-Minerals (TAB-A-YURIDIA MAXIMUM) Oral Tab, Take 1 tablet by mouth daily. , Disp: , Rfl:   •  Acidoph

## 2017-06-08 NOTE — PROGRESS NOTES
Patient here for D8C11 of Kyprolis and Zometa. Awaiting labs. See toxicities.      Education Record    Learner:  Patient    Disease / Diagnosis: MM    Barriers / Limitations:  None   Comments:    Method:  Discussion   Comments:    General Topics:  Medicatio

## 2017-06-08 NOTE — PROGRESS NOTES
ANP Visit Note    Patient Name: Elvira Guthrie   YOB: 1955   Medical Record Number: [id number]   CSN: [id number]   Date of visit: 6/1/2017       Chief Complaint/Reason for Visit: Multiple myeloma, light chain lambda and IgG lambda     History o duodenum - surgery- \"clips\" per pt    IR PORT A CATH PROCEDURE  7/1/14    Comment double lumen power port placed    OTHER  11/2014    Comment stem cell transplant     VASECTOMY  4-14-97       Allergies:    Pcn [Penicillins]           Comment:Lost voice (TAB-A-YURIDIA MAXIMUM) Oral Tab, Take 1 tablet by mouth daily. , Disp: , Rfl:   •  Acidophilus/Pectin (PROBIOTIC) Oral Cap, Take 1 capsule by mouth daily. , Disp: , Rfl:   •  Vitamin B-12 (VITAMIN B12) 1000 MCG Oral Tab, Take 2,000 mcg by mouth daily. , Disp: , RDW Date: 05/18/2017   Value: 15.6  Ref Range 11.5-16.0 % Status: Final   RDW-SD Date: 05/18/2017   Value: 59.7* Ref Range 35.1-46.3 fL Status: Final   Neutrophil Absolute Prelim Date: 05/18/2017   Value: 5.33  Ref Range 1.30-6.70 x10 (3) uL Status: Jacky Cope systems and currently there are no active problems.     Planned Follow Up: 6/29/17     Risk Level: HIGH Multiple myleoma    Electronically Signed by:    Amanda Santos FNP-BC  Nurse Practitioner  THE [facility]

## 2017-06-09 NOTE — PROGRESS NOTES
Education Record    Learner:  Patient    Disease / Diagnosis:Multiple myeloma    Barriers / Limitations:  None   Comments:    Method:  Discussion   Comments:    General Topics:  Medication, Side effects and symptom management, Plan of care [facility]

## 2017-06-15 NOTE — PROGRESS NOTES
Pt arrived for treatment and denies any new or worsening adverse S/S, pt alert and appears in nad, pt ambulates with steady gait  Education Record    Learner:  Patient    Disease / Diagnosis:myeloma    Barriers / Limitations:  None   Comments:    Method:

## 2017-06-29 NOTE — PROGRESS NOTES
ANP Visit Note    Patient Name: Will Marin   YOB: 1955   Medical Record Number: [id number]   CSN: [id number]   Date of visit: 6/29/2017       Chief Complaint/Reason for Visit:  Patient presents with:   Follow - Up: follow up with Jadyn Childs 5 mg daily x 21 days every 28 days. Immediately liver function increased but initially mildly. However, they then increased significantly. Hepatitis B viral load was checked and found to be lower than before lenolidomide.  As a result it was felt that lenol III multiple myeloma (HCC)     GERD without esophagitis       Medical History:  Past Medical History:   Diagnosis Date   • Anemia    • Bone marrow replaced by transplant 1/2015   • Cancer ([facility] [address]     MULTIPLE MYELOMA, LIGHT CHAIN, STAGE 3   • History of bloo tablets (20mg) By mouth daily on 7/20/17 and 7/21/2017, Disp: 10 tablet, Rfl: 0  •  ACYCLOVIR 400 MG Oral Tab, TAKE 1 TABLET BY MOUTH TWICE DAILY, Disp: 60 tablet, Rfl: 0  •  Pantoprazole Sodium 40 MG Oral Tab EC, TAKE 1 TABLET(40 MG) BY MOUTH EVERY MORNIN cervical,supraclavicular, axillary, or inguinal regions. Psych/Depression: mood and affect are appropriate.      Labs:    Recent Results (from the past 24 hour(s))  -COMP METABOLIC PANEL (14)   Collection Time: 06/29/17 11:15 AM   Result Value Ref Range on entecavir.      4. GERD: Patient to use pantoprazole when taking dexamethasone. Emotional Well Being:  I have assessed the patient's emotional well-being and any concerns about anxiety or depression.   We discussed issues of distress, coping diff

## 2017-06-29 NOTE — PROGRESS NOTES
Patient is here today for follow up with Dr. Cecilio Narayan for multiple myeloma. Patient denies pain. Stated intermittent fatigue. Appetite is good. Denies nausea. Medication list, medical history and toxicities were reviewed and updated.       Post APN visit pat

## 2017-06-30 NOTE — PATIENT INSTRUCTIONS
Education Record     Learner:  Patient     Disease / Diagnosis:MULTIPLE MYELOMA     Barriers / Limitations:  None              Comments:     Method:  Brief focused and Reinforcement              Comments:     General Topics:  Medication, Side effects and s

## 2017-07-06 NOTE — PROGRESS NOTES
Education Record    Learner:  Patient    Disease / Diagnosis:multiple myeloma    Barriers / Limitations:  None   Comments:    Method:  Discussion   Comments:    General Topics:  Diet, Infection, Medication, Precautions, Side effects and symptom management,

## 2017-07-06 NOTE — PROGRESS NOTES
ANP Visit Note    Patient Name: Julissa Rosa   YOB: 1955   Medical Record Number: [id number]   CSN: [id number]   Date of visit: 7/6/2017     Chief Complaint/Reason for Visit: Follow up chemo/ MM / weight loss     History of Present Illness: P antineoplastic chemotherapy    • Renal disorder    • Stomach ulcer    • Visual impairment        Surgical History:  Past Surgical History:  1/14/08: COLONOSCOPY      Comment: normal  7/1/14: IR PORT A CATH PROCEDURE      Comment: double lumen power port pl Disp: , Rfl:   •  Calcium Citrate-Vitamin D (CALCIUM CITRATE + D3 OR), Take by mouth., Disp: , Rfl:   •  Entecavir (BARACLUDE) 0.5 MG Oral Tab, Take 1 tablet (0.5 mg total) by mouth daily. , Disp: 30 tablet, Rfl: 11  •  Lysine 1000 MG Oral Tab, Take 1 table Final  GFR                                           Date: 06/29/2017  Value: 49*         Ref range: >=60               Status: Final  Calcium, Total                                Date: 06/29/2017  Value: 8.9         Ref range: 8.3 - 10.3 mg/dL   Status: Date: 06/29/2017  Value: 26.0*       Ref range: 37.0 - 53.0 %      Status: Final  PLT                                           Date: 06/29/2017  Value: 121.0*      Ref range: 150.0 - 450.0 10*  Status: Final  MCV 06/29/2017  Value: 16.1        Ref range: %                  Status: Final  Monocyte %                                    Date: 06/29/2017  Value: 13.4        Ref range: %                  Status: Final  Eosinophil %                                  Date:

## 2017-07-06 NOTE — TELEPHONE ENCOUNTER
Patient's wife called very concerned that the patient has been withdrawn, angry, combative, not eating as well as he should. [place] that there is no longer peace in their household. Asked if someone can see the patient today while he is on treatment.  Discuss

## 2017-07-10 NOTE — PROGRESS NOTES
Nutrition Consultation    Patient Name: Carry Bending  YOB: 1955  Medical Record Number: [id number]   Account Number: [de-identified]  Dietitian: Brodie Larry RD    Date of visit: 7/10/2017    Diet Rx: High protein/calorie    Pertinent Dx: Stone [place]a Disp: , Rfl:     Pertinent Labs: ALB 3.3 mg/dl (7/6/17)    Height:  5'8\"      Weight: 158 lbs   WT HX: +/- 6 lbs UBW: 170 lbs (9/2016)        IBW: 154 +/- 10%    Estimated Nutrition Needs: 30-35 kcals/kg PBW = 2867-8834 KCALS/d; 1.5 gms protein/kg =  108

## 2017-07-12 NOTE — PROGRESS NOTES
Education Record    Learner:  Patient    Disease / Diagnosis:Light chain disease, lambda type    Barriers / Limitations:  None   Comments:    Method:  Brief focused   Comments:    General Topics:  Infection, Medication, Pain, Precautions, Procedure, Side e

## 2017-07-27 NOTE — PROGRESS NOTES
Patient is here today for follow up with Dr. Olivia Onofre for Multiple Myeloma. Patient denies pain. Stated intermittent fatigue. Decreased appetite - eats good when it is a food he likes. Diarrhea - resolves with Imodium.  Neuropathy in fingers and toes - able

## 2017-07-29 NOTE — PROGRESS NOTES
[facility] Progress Note      Patient Name: Steven Rosario   YOB: 1955  Medical Record Number: [id number]  Attending Physician: Dayne Mccoy M.D.      Date of Visit: 7/27/2017      Chief Complaint  Multiple myeloma, light chain la maintenance lenolidomide 5 mg daily x 21 days every 28 days. Immediately liver function increased but initially mildly. However, they then increased significantly. Hepatitis B viral load was checked and found to be lower than before lenolidomide.  As a resu type.    Social History (historical data, reviewed)  Previous tobacco history but quit 1992; uses alcohol weekly; denies illicit drug use.       Current Medications     dexamethasone (DECADRON) 4 MG tablet Take 5 tablets (20mg) By mouth daily on 7/20/17 and clear; sclera anicteric. ENMT External nose normal; external ears normal.  Neck Supple; no masses. Respiratory Normal effort; no respiratory distress; clear to auscultation bilaterally. Cardiovascular Regular rate and rhythm; normal S1S2.   Abdomen Sof Absolute 0.71 (H) 0.10 - 0.60 x10(3) uL   Eosinophil Absolute 0.11 0.00 - 0.30 x10(3) uL   Basophil Absolute 0.01 0.00 - 0.10 x10(3) uL   Immature Granulocyte Absolute 0.22 0.00 - 1.00 x10(3) uL   Neutrophil % 61.7 %   Lymphocyte % 20.2 %   Monocyte % 12. 2

## 2017-08-03 NOTE — PROGRESS NOTES
Patient is here today for follow up with [facility] APN. Patient denies pain. Fatigued. Medication list and medical history were reviewed and updated.       Education Record    Learner:  Patient    Disease / Diagnosis: Multiple myeloma    Barriers / Ernestine Luevano

## 2017-08-15 NOTE — TELEPHONE ENCOUNTER
His labs show stable myeloma. Nothing about what we're doing would cause fever. His hemoglobin is better. His hepatitis is stable. (Routing comment)       Patient informed.

## 2017-08-15 NOTE — TELEPHONE ENCOUNTER
Pt would like his lab results he had done on Friday   He had a fever over the weekend and spoke with the on call doctor. He does not have a fever today. He is fatigued.

## 2017-08-15 NOTE — TELEPHONE ENCOUNTER
Since his hemoglobin is improving, I would like him to stay off therapy for a little longer. I would like to see him next week with labs and a chemo spot. I'll decide on that day whether we give treatment or not.       Pt informed of above and trans

## 2017-08-17 NOTE — PROGRESS NOTES
ANP Visit Note    Patient Name: Abdelrahman Langston   YOB: 1955   Medical Record Number: [id number]   CSN: [id number]   Date of visit: 8/17/2017       Chief Complaint/Reason for Visit: Follow Up / Multiple Myeloma      Oncologic History  Hemanth Barraza function increased but initially mildly. However, they then increased significantly. Hepatitis B viral load was checked and found to be lower than before lenolidomide. As a result it was felt that lenolidomide was the cause.  It was discontinued and the tra due to treatment     Hepatitis B virus infection     Multiple myeloma in relapse ([facility] [address]     Stage III multiple myeloma ([facility] [address]     GERD without esophagitis       Medical History:  Past Medical History:   Diagnosis Date   • Anemia    • Bone marrow replaced by Medications:    Current Outpatient Prescriptions:   •  ACYCLOVIR 400 MG Oral Tab, TAKE 1 TABLET BY MOUTH TWICE DAILY, Disp: 60 tablet, Rfl: 0  •  dexamethasone (DECADRON) 4 MG tablet, Take 5 tablets (20mg) By mouth daily on 7/20/17 and 7/21/2017, Dis edema. Neurological: Grossly intact. Lymphatics: There is no palpable lymphadenopathy throughout in the cervical,supraclavicular, axillary, or inguinal regions. Psych/Depression: mood and affect are appropriate.      Labs:  Patient Active Problem List

## 2017-08-21 NOTE — TELEPHONE ENCOUNTER
MD Roque Ch RN             I would like for him to get a skeletal survey before he sees me if possible. Order is in [facility]

## 2017-08-24 NOTE — PROGRESS NOTES
Pt here for bone marrow biopsy. Patient tolerated the procedure well. Tight occlusive dressing applied after bone marrow biopsy. No signs of bleeding noted.  Discharge instructions given to the patient

## 2017-09-04 PROBLEM — D63.1 ANEMIA IN STAGE 3 CHRONIC KIDNEY DISEASE (HCC): Status: ACTIVE | Noted: 2017-01-01

## 2017-09-04 PROBLEM — D63.1 ANEMIA IN STAGE 3 CHRONIC KIDNEY DISEASE: Status: ACTIVE | Noted: 2017-01-01

## 2017-09-04 PROBLEM — N18.30 ANEMIA IN STAGE 3 CHRONIC KIDNEY DISEASE (HCC): Status: ACTIVE | Noted: 2017-01-01

## 2017-09-04 PROBLEM — N18.30 ANEMIA IN STAGE 3 CHRONIC KIDNEY DISEASE: Status: ACTIVE | Noted: 2017-01-01

## 2017-09-04 NOTE — PROGRESS NOTES
[facility] Progress Note      Patient Name: Delonte Borges   YOB: 1955  Medical Record Number: [id number]  Attending Physician: Shania Fuller M.D.      Date of Visit: 8/24/2017      Chief Complaint  Multiple myeloma, light chain la lenolidomide 5 mg daily x 21 days every 28 days. Immediately liver function increased but initially mildly. However, they then increased significantly. Hepatitis B viral load was checked and found to be lower than before lenolidomide.  As a result it was fe use.      Current Medications     [DISCONTINUED] ACYCLOVIR 400 MG Oral Tab TAKE 1 TABLET BY MOUTH TWICE DAILY Disp: 60 tablet Rfl: 0   Pantoprazole Sodium 40 MG Oral Tab EC TAKE 1 TABLET(40 MG) BY MOUTH EVERY MORNING BEFORE BREAKFAST Disp: 30 tablet Rfl: 5 bilaterally. Cardiovascular Regular rate and rhythm; normal S1S2. Abdomen Soft; nontender; no masses. Extremities No lower extremity edema. Skin Skin is warm and dry. Neurologic Alert and oriented x 3; motor and sensory grossly intact.   Psychiatri Lymphocyte % 32.3 %   Monocyte % 11.9 %   Eosinophil % 3.2 %   Basophil % 0.4 %   Immature Granulocyte % 2.7 %     Impression and Plan   1. Multiple myleoma: Patient with rising M protein not yet meeting criteria for progressive disease.  However, he ha

## 2017-09-07 NOTE — TELEPHONE ENCOUNTER
Let patient know that insurance has authorized new treatment with daratumumab. He can start treatment any time. Initially treatment is every 2 weeks. He'll need chemo ed on first day of treatment along with labs.  Josse Jimenez should see him first day of treat

## 2017-09-08 NOTE — TELEPHONE ENCOUNTER
Patient called to schedule appointment for treatment and APN visit.  Patient transferred to [facility] - to schedule

## 2017-09-11 NOTE — TELEPHONE ENCOUNTER
Patient wanted to know if it would be ok for a flu vaccine if he is on Darzalex. Patient notified per Mirian Sacks. Ok for flu vaccine. It is not a live virus. Patient stated understanding.

## 2017-09-14 NOTE — PROGRESS NOTES
Education Record    Learner:  Patient and Spouse    Disease / Diagnosis:multiple myeloma    Barriers / Limitations:  None   Comments:    Method:  Brief focused, Discussion, Printed material and Reinforcement   Comments:    General Topics:  Infection, Medic

## 2017-09-14 NOTE — PATIENT INSTRUCTIONS
[facility] Chemo Education Overview    Learner:  Patient and Spouse    Learner's Barriers / Limitations of Education:  None    Names of Drugs:      Chemotherapy: Darzalex           Dexamethasone: starting tomorrow-take 2 tabs twice a day for 2 da Keep appointments for follow-up doses as directed. It is important not to miss your dose. Call your doctor or health care professional if you are unable to keep an appointment. Where should I keep my medicine? Keep out of the reach of children.   This devendra Date Last Reviewed:   NOTE:This sheet is a summary. It may not cover all possible information. If you have questions about this medicine, talk to your doctor, pharmacist, or health care provider.  Copyright© 2016 [employer]

## 2017-09-18 NOTE — TELEPHONE ENCOUNTER
Date of Treatment:    9/14/17                             Type of Chemo: Darzalex    Comments: Spoke with patient. He was in [place] today- spoke with Stephanie Hernández RN and received clarification on steroid RX.  Pt also had labs and receive urine collection conta

## 2017-09-21 NOTE — PROGRESS NOTES
[facility] Progress Note      Patient Name: Elvis Beltrán   YOB: 1955  Medical Record Number: [id number]  Attending Physician: Rosalba Lazaro M.D.      Date of Visit: 9/21/2017      Chief Complaint  Multiple myeloma, light chain la lenolidomide 5 mg daily x 21 days every 28 days. Immediately liver function increased but initially mildly. However, they then increased significantly. Hepatitis B viral load was checked and found to be lower than before lenolidomide.  As a result it was fe reviewed)  Surgical correction of duodenal ulcer. Family History (historical data, reviewed)  Father with cancer of unknown type.     Social History (historical data, reviewed)  Previous tobacco history but quit 1992; uses alcohol weekly; denies illicit atraumatic. Eyes Conjunctiva clear; sclera anicteric. ENMT External nose normal; external ears normal.  Neck Supple; no masses. Respiratory Normal effort; no respiratory distress; clear to auscultation bilaterally.    Cardiovascular Regular rate and rhy Neutrophil % 82.3 %   Lymphocyte % 4.6 %   Monocyte % 11.7 %   Eosinophil % 0.0 %   Basophil % 0.0 %   Immature Granulocyte % 1.4 %   -SCAN SLIDE   Collection Time: 09/18/17  9:51 AM   Result Value Ref Range   Slide Review Platelets reviewed on smear Neutrophil Absolute 4.09 1.30 - 6.70 x10(3) uL   Lymphocyte Absolute 0.52 (L) 0.90 - 4.00 x10(3) uL   Monocyte Absolute 0.49 0.10 - 0.60 x10(3) uL   Eosinophil Absolute 0.22 0.00 - 0.30 x10(3) uL   Basophil Absolute 0.00 0.00 - 0.10 x10(3) uL   Immature

## 2017-09-21 NOTE — PROGRESS NOTES
Education Record    Learner:  Patient    Disease / Diagnosis: Multiple myeloma in relapse    Barriers / Limitations:  None   Comments:    Method:  Brief focused and Reinforcement   Comments:    General Topics:  Plan of care reviewed   Comments:    Outcome:

## 2017-09-26 PROBLEM — D63.1 ANEMIA IN STAGE 4 CHRONIC KIDNEY DISEASE (HCC): Status: ACTIVE | Noted: 2017-01-01

## 2017-09-26 PROBLEM — D63.1 ANEMIA IN STAGE 4 CHRONIC KIDNEY DISEASE: Status: ACTIVE | Noted: 2017-01-01

## 2017-09-26 PROBLEM — N18.4 ANEMIA IN STAGE 4 CHRONIC KIDNEY DISEASE (HCC): Status: ACTIVE | Noted: 2017-01-01

## 2017-09-26 PROBLEM — N18.4 STAGE 4 CHRONIC KIDNEY DISEASE (HCC): Status: ACTIVE | Noted: 2017-01-01

## 2017-09-26 PROBLEM — N18.4 ANEMIA IN STAGE 4 CHRONIC KIDNEY DISEASE: Status: ACTIVE | Noted: 2017-01-01

## 2017-09-26 PROBLEM — N18.4 CKD (CHRONIC KIDNEY DISEASE) STAGE 4, GFR 15-29 ML/MIN (HCC): Status: ACTIVE | Noted: 2017-01-01

## 2017-09-26 NOTE — PROGRESS NOTES
HPI:   Kamala Wells is a 58year old male who presents for a Medicare Initial Annual Wellness visit (Once after 12 month Medicare anniversary) .     Cancer Tx  His last annual assessment has been over 1 year: Annual Physical due on 09/23/2017         [facility] directed. epogen procrit - once a week injection   methylPREDNISolone (MEDROL) 4 MG Oral Tab Take 5 tablets (20 mg) daily with food for the two days after chemotherapy.    ACYCLOVIR 400 MG Oral Tab TAKE 1 TABLET BY MOUTH TWICE DAILY   Pantoprazole Sodium 40 denies chest pain on exertion  GI: denies abdominal pain, denies heartburn  : 0 per night nocturia, no complaint of urinary incontinence  MUSCULOSKELETAL: denies back pain  NEURO: denies headaches  PSYCHE: denies depression or anxiety  HEMATOLOGIC: + ane VACCINATIONS - Influenza, Pneumococcal, Zoster, Tetanus     Immunization History   Administered Date(s) Administered   • >=3 YRS FLUZONE OR FLUARIX QUAD PRESERVE FREE SINGLE DOSE (31709) FLU CLINIC 10/01/2015   • HIB (4 Dose) 12/14/2015, 02/17/2016, 11/18/ Walks    How would you describe your daily physical activity?: Moderate    How would you describe your current health state?: Fair    How do you maintain positive mental well-being?: Social Interaction;Puzzles;Games; Visiting Friends; Visiting Family    If y Assessment\" under Medicare Assessment section in Charting, test patient and document. Then, refresh your progress note to see your input here.   Cognitive Assessment     What day of the week is this?: Correct    What month is it?: Correct    What year i STACEY 3 YRS+    Update Immunization Activity if applicable    Pneumoccocal 13 (Prevnar)   Orders placed or performed in visit on 10/01/15  -PNEUMOCOCCAL VACC, 13 STACEY IM   Orders placed or performed in visit on 07/16/15  -PNEUMOCOCCAL VACC, 13 STACEY IM   Orders flowsheet data found. COPD      Spirometry Testing Annually Spirometry date:  No flowsheet data found.

## 2017-09-26 NOTE — PATIENT INSTRUCTIONS
Aishwarya Domínguez's SCREENING SCHEDULE   Tests on this list are recommended by your physician but may not be covered, or covered at this frequency, by your insurer. Please check with your insurance carrier before scheduling to verify coverage.     PREVENTATI Age 76     Colonoscopy Screen   Covered every 10 years- more often if abnormal Colonoscopy,10 Years due on 01/14/2018 Update Health Maintenance if applicable    Flex Sigmoidoscopy Screen  Covered every 5 years No results found for this or any previous visi Risk No orders found for this or any previous visit.  Medium/high risk factors:   End-stage renal disease   Hemophiliacs who received Factor VIII or IX concentrates   Clients of institutions for the mentally retarded   Persons who live in the same house as

## 2017-09-28 NOTE — PROGRESS NOTES
Patient is here today for follow up with Tootie Slater for Multiple Myeloma. Patient is on Darzalex Therapy. Patient denies pain. Stated still fatigued but improving. Appetite is decreased but till eating.  Medication list, medical history and toxicities were

## 2017-09-28 NOTE — PROGRESS NOTES
[facility] Progress Note      Patient Name: Christal Love   YOB: 1955  Medical Record Number: [id number]  Attending Physician: Jacqueline Woods M.D.      Date of Visit: 9/28/2017      Chief Complaint  Multiple myeloma, light chain la lenolidomide 5 mg daily x 21 days every 28 days. Immediately liver function increased but initially mildly. However, they then increased significantly. Hepatitis B viral load was checked and found to be lower than before lenolidomide.  As a result it was fe Surgical History (historical data, reviewed)  Surgical correction of duodenal ulcer. Family History (historical data, reviewed)  Father with cancer of unknown type.     Social History (historical data, reviewed)  Previous tobacco history but quit 1992; u Cuff Size: adult)   Pulse 98   Temp (!) 96.7 °F (35.9 °C) (Tympanic)   Resp 18   Ht 1.755 m (5' 9.09\")   Wt 71.9 kg (158 lb 8 oz)   SpO2 96%   BMI 23.34 kg/m²     Physical Examination   Constitutional Thin; in no apparent distress; appears close to chrono - 16.0 %   RDW-SD 54.4 (H) 35.1 - 46.3 fL   Neutrophil Absolute Prelim 3.23 1.30 - 6.70 x10 (3) uL   Neutrophil Absolute 3.23 1.30 - 6.70 x10(3) uL   Lymphocyte Absolute 0.34 (L) 0.90 - 4.00 x10(3) uL   Monocyte Absolute 0.30 0.10 - 0.60 x10(3) uL   [place]

## 2017-09-28 NOTE — PROGRESS NOTES
Education Record    Learner:  Patient    Disease / Diagnosis: Multiple myeloma in relapse     Barriers / Limitations:  None   Comments:    Method:  Brief focused and Reinforcement   Comments:    General Topics:  Plan of care reviewed   Comments:    Outcome

## 2017-09-29 PROBLEM — C90.00 MYELOMA KIDNEY: Status: ACTIVE | Noted: 2017-01-01

## 2017-09-29 PROBLEM — N17.9 AKI (ACUTE KIDNEY INJURY) (HCC): Status: ACTIVE | Noted: 2017-01-01

## 2017-09-29 PROBLEM — C90.00 MYELOMA KIDNEY (HCC): Status: ACTIVE | Noted: 2017-01-01

## 2017-09-29 PROBLEM — N18.30 STAGE 3 CHRONIC KIDNEY DISEASE (HCC): Status: ACTIVE | Noted: 2017-01-01

## 2017-09-29 PROBLEM — N08 MYELOMA KIDNEY: Status: ACTIVE | Noted: 2017-01-01

## 2017-09-29 PROBLEM — N08 MYELOMA KIDNEY (HCC): Status: ACTIVE | Noted: 2017-01-01

## 2017-09-29 NOTE — PROGRESS NOTES
Nephrology Progress Note      Ruth  is a 58year old male.     HPI:   Patient presents with:  Chronic Kidney Disease       59 y/o with MM - currently on palliative tx with daratumumab, CKD - stage III (baseline Cr <2) who presents for f/u evaluatio Alcohol use: Yes           1.2 oz/week     Cans of beer: 2 per week     Comment: rare       Medications (Active prior to today's visit):    Current Outpatient Prescriptions:  ACYCLOVIR 400 MG Oral Tab TAKE 1 TABLET BY MOUTH TWICE DAILY D : 158 lb 2 oz  09/21/17 : 159 lb 9.6 oz  09/21/17 : 159 lb 9.6 oz  09/14/17 : 164 lb 9.6 oz       General: Alert and oriented in no apparent distress.  Pale  HEENT: No scleral icterus, MMM  Neck: Supple, no MACEY or thyromegaly  Cardiac: Regular rate and rhyt

## 2017-10-05 NOTE — PROGRESS NOTES
Patient is here today for follow up with Aaron Canales for Multiple Myeloma - D9M01 Darzalex therapy. Fatigue present but improving. Decreased appetite - denies nausea. Medication list, medical history and toxicities were reviewed and updated.  Neuropathy gruber

## 2017-10-06 NOTE — PROGRESS NOTES
[facility] Progress Note      Patient Name: Elvis Beltrán   YOB: 1955  Medical Record Number: [id number]  Attending Physician: Rosalba Lazaro M.D.      Date of Visit: 10/5/2017      Chief Complaint  Multiple myeloma, light chain la maintenance lenolidomide 5 mg daily x 21 days every 28 days. Immediately liver function increased but initially mildly. However, they then increased significantly. Hepatitis B viral load was checked and found to be lower than before lenolidomide.  As a resu data, reviewed)  Surgical correction of duodenal ulcer. Family History (historical data, reviewed)  Father with cancer of unknown type.     Social History (historical data, reviewed)  Previous tobacco history but quit 1992; uses alcohol weekly; denies il Patient Position: Sitting, Cuff Size: adult)   Pulse 85   Temp (!) 96.9 °F (36.1 °C) (Tympanic)   Resp 18   Ht 1.755 m (5' 9.09\")   Wt 70.8 kg (156 lb)   SpO2 99%   BMI 22.97 kg/m²     Physical Examination   Constitutional Thin; in no apparent distress; a 37.0 g/dL   RDW 14.2 11.5 - 16.0 %   RDW-SD 54.6 (H) 35.1 - 46.3 fL   Neutrophil Absolute Prelim 2.71 1.30 - 6.70 x10 (3) uL   Neutrophil Absolute 2.71 1.30 - 6.70 x10(3) uL   Lymphocyte Absolute 0.39 (L) 0.90 - 4.00 x10(3) uL   Monocyte Absolute 0.28 0.10

## 2017-10-12 NOTE — PROGRESS NOTES
Patient is here today for follow up with Saige Brown for Multiple Myeloma. Patient denies pain. Chill one time few days post treatment was afebrile. Stated fatigue ok with rest. Appetite is decreased at times - denies nausea.  Neuropathy unchanged from prior

## 2017-10-15 NOTE — PROGRESS NOTES
[facility] Progress Note      Patient Name: Olga Spicer   YOB: 1955  Medical Record Number: [id number]  Attending Physician: Jadon Guthrie M.D.      Date of Visit: 10/12/2017      Chief Complaint  Multiple myeloma, light chain l maintenance lenolidomide 5 mg daily x 21 days every 28 days. Immediately liver function increased but initially mildly. However, they then increased significantly. Hepatitis B viral load was checked and found to be lower than before lenolidomide.  As a resu correction of duodenal ulcer. Family History (historical data, reviewed)  Father with cancer of unknown type. Social History (historical data, reviewed)  Previous tobacco history but quit 1992; uses alcohol weekly; denies illicit drug use.       Darren Christensen pain.   Neurologic Stable mild peripheral neuropathy involving the feet bilaterally    Vital Signs   /67 (BP Location: Left arm, Patient Position: Sitting, Cuff Size: adult)   Pulse 88   Temp 97.8 °F (36.6 °C) (Tympanic)   Resp 18   Ht 1.755 m (5' 9. M <5.3 (L) 43.0 - 279.0 mg/dL   -CBC W/ DIFFERENTIAL   Collection Time: 10/12/17  9:04 AM   Result Value Ref Range   WBC 3.1 (L) 4.0 - 13.0 x10(3) uL   RBC 2.24 (L) 4.30 - 5.70 x10(6)uL   HGB 8.2 (L) 13.0 - 17.0 g/dL   HCT 24.2 (L) 37.0 - 53.0 %   PLT 94. 0

## 2017-10-18 NOTE — TELEPHONE ENCOUNTER
Pt called and notified per Dr. Reena Bland- his myeloma number, specifically his lambda value, has fallen with treatment. Good results.

## 2017-10-19 NOTE — PATIENT INSTRUCTIONS
Education Record     Learner:  Patient     Disease / Diagnosis:MULTIPLE MYELOMA     Barriers / Limitations:  None                Comments:     Method:  Brief focused and Reinforcement                Comments:     General Topics:  Medication, Side effects a

## 2017-11-02 NOTE — PROGRESS NOTES
Education Record    Learner:  Patient    Disease / Diagnosis:Multiple myeloma in relapse     Barriers / Limitations:  None   Comments:    Method:  Brief focused   Comments:    General Topics:  Infection, Medication, Pain, Precautions, Procedure, Side effec

## 2017-11-06 NOTE — PROGRESS NOTES
[facility] Progress Note      Patient Name: Laura Stevenson   YOB: 1955  Medical Record Number: [id number]  Attending Physician: Venkatesh Gusman M.D.      Date of Visit: 11/9/2017      Chief Complaint  Multiple myeloma, light chain la maintenance lenolidomide 5 mg daily x 21 days every 28 days. Immediately liver function increased but initially mildly. However, they then increased significantly. Hepatitis B viral load was checked and found to be lower than before lenolidomide.  As a resu duodenal ulcer. Past Surgical History (historical data, reviewed)  Surgical correction of duodenal ulcer. Family History (historical data, reviewed)  Father with cancer of unknown type.     Social History (historical data, reviewed)  Previous tobacco chest pain. Musculoskeletal Chronic mild back pain.    Neurologic Stable mild peripheral neuropathy involving the feet bilaterally    Vital Signs   /68 (BP Location: Left arm, Patient Position: Sitting, Cuff Size: adult)   Pulse 102   Temp 99.4 °F (3 Absolute 3.40 1.30 - 6.70 x10(3) uL   Lymphocyte Absolute 0.48 (L) 0.90 - 4.00 x10(3) uL   Monocyte Absolute 0.36 0.10 - 0.60 x10(3) uL   Eosinophil Absolute 0.09 0.00 - 0.30 x10(3) uL   Basophil Absolute 0.01 0.00 - 0.10 x10(3) uL   Immature Granulocyte A

## 2017-11-08 PROBLEM — N18.4 STAGE 4 CHRONIC KIDNEY DISEASE (HCC): Status: RESOLVED | Noted: 2017-01-01 | Resolved: 2017-01-01

## 2017-11-08 PROBLEM — N18.30 STAGE 3 CHRONIC KIDNEY DISEASE (HCC): Status: RESOLVED | Noted: 2017-01-01 | Resolved: 2017-01-01

## 2017-11-08 NOTE — PROGRESS NOTES
PRE-OP Physical   What testing is needed for this surgery/patient? EKG    What is the full name of procedure/ surgery? Rt catraract removal    Date being surgery or procedure is being done?   11/21/17    What is the doctor’s full name  that is doing the s Vitamin B-12 (VITAMIN B12) 1000 MCG Oral Tab Take 2,000 mcg by mouth daily. Disp:  Rfl:    ascorbic acid (VITAMIN C) 1000 MG Oral Tab Take 1,000 mg by mouth 2 (two) times daily.    Disp:  Rfl:      No current facility-administered medications on file prio no rashes,no suspicious lesions  HEENT: atraumatic, normocephalic, R TM decrease motion / L TM - nml, Pharynx normal  NECK: supple, no cervical adenopathy  LUNGS: clear to auscultation  CARDIO: RRR without murmur  ABD soft, nontender, normal BS, no masses,

## 2017-11-09 NOTE — PROGRESS NOTES
Nephrology Progress Note      Joe Ninoska is a 58year old male.     HPI:   Patient presents with:  Chronic Kidney Disease       59 y/o with MM - currently on palliative tx with daratumumab, CKD - stage III (baseline Cr <2) who presents for f/u evaluatio tobacco: Never Used                      Alcohol use: Yes           1.2 oz/week     Cans of beer: 2 per week     Comment: rare       Medications (Active prior to today's visit):    Current Outpatient Prescriptions:  sodium bicarbonate 650 MG Oral Tab Take Encounters:  11/09/17 : 154 lb  11/09/17 : 154 lb  11/08/17 : 151 lb 4 oz  11/02/17 : 154 lb  10/26/17 : 156 lb 8 oz  10/19/17 : 159 lb       General: Alert and oriented in no apparent distress.  Pale  HEENT: No scleral icterus, MMM  Neck: Supple, no MACEY or (L)    TYPE AND SCREEN Unknown    Rpt   ABO GROUPING Unknown    O   RH FACTOR Unknown    Positive   ANTIBODY SCREEN Unknown    Positive   ANTIBODY IDENTIFICATION Unknown    Drug Related Ab p...   WBC Latest Ref Range: 4.0 - 13.0 x10(3) uL 4.4 4.4 3.9 (L) Windy Hair regarding his entecavir (for Hep B) dosing   - monitor labs carefully  - continue chemo per Dr. Olivia Onofre - reviewed notes from today - he does not seem to be responding as anticipated   - nothing specific we can do from renal standpoint; if he continu

## 2017-11-09 NOTE — PROGRESS NOTES
Patient is here today for follow up with Eunice Pappas for Multiple Myeloma. Patient denies pain. Increased fatigue - stated does a few things than has to rest. Appetite is decreased. Denies nausea. Stated having problems with hearing left ear.  Cataract surg

## 2017-11-10 NOTE — PROGRESS NOTES
Pt tolerated transfusion well. AVS printed and given to pt.     Education Record    Learner:  Patient    Disease / Diagnosis:    Barriers / Limitations:  None   Comments:    Method:  Discussion   Comments:    General Topics:  Plan of care reviewed   Comment

## 2017-11-24 NOTE — TELEPHONE ENCOUNTER
Spoke to LAL[employer] wife and she said today's appt was supposed to be canceled and they are coming in on Monday instead.

## 2017-11-27 NOTE — PROGRESS NOTES
Patient is here today for follow up with Henrique Moreno for Multiple Myeloma. Patient denies pain. Very fatigued, wife stated sleeping most of the day. Weight loss approx 6 lbs since last visit on 11/9. Decreased appetite. Denies nausea - however emesis x1.  G

## 2017-11-27 NOTE — PROGRESS NOTES
[facility] Progress Note      Patient Name: Umair Rivas   YOB: 1955  Medical Record Number: [id number]  Attending Physician: Miquel Cornejo M.D.      Date of Visit: 11/27/2017      Chief Complaint  Multiple myeloma, light chain l maintenance lenolidomide 5 mg daily x 21 days every 28 days. Immediately liver function increased but initially mildly. However, they then increased significantly. Hepatitis B viral load was checked and found to be lower than before lenolidomide.  As a resu has no new bony pain. Performance Status   Karnofsky 80% - Normal activity, but requires effort. Past Medical History (historical data, reviewed)  Asthma; GERD; duodenal ulcer.     Past Surgical History (historical data, reviewed)  Surgical correctio [penicillins]; revlimid [lenalidomide]; tetracycline; and vancomycin. Review of Systems   Constitutional Fatigue. Respiratory No dyspnea, cough, hemoptysis, pleuritic chest pain. Musculoskeletal Chronic mild back pain.    Neurologic Stable mild perip 4.2 4.0 - 13.0 x10(3) uL   RBC 2.19 (L) 4.30 - 5.70 x10(6)uL   HGB 7.5 (L) 13.0 - 17.0 g/dL   HCT 21.8 (L) 37.0 - 53.0 %   PLT 87.0 (L) 150.0 - 450.0 10(3)uL   MCV 99.5 (H) 80.0 - 99.0 fL   MCH 34.2 (H) 27.0 - 33.2 pg   MCHC 34.4 31.0 - 37.0 g/dL   RDW 19. therapy. Electronically signed by:    Brant Hammans Aisha Shad, M.D.   [facility]  Director, Bone and Soft Tissue Sarcoma Program  Section of Hematology/Oncology, [address] [facility]

## 2017-11-27 NOTE — PROGRESS NOTES
Medical Care Provider Statement completed and faxed to [address] [place] [address], [phone]; original to patient's wife Peyton Palmer.

## 2017-11-28 NOTE — TELEPHONE ENCOUNTER
Date of Treatment: 11/27/17                                Type of Chemo: Rosy, velcade, dex    Comments: left general message for patient at home number. Requested he call back with any issues or concerns after treatment.  Verified next appointment schedul

## 2017-11-30 NOTE — PROGRESS NOTES
IV Chemotherapy Education    Patient:Grady Malone    Date:  9/14/17  Diagnosis: Multiple myeloma       Drug names:  Daratumumab (Darzalex)    Treatment Effects on Bone Marrow:  Chemotherapy action on cancer / normal cells}  Function of white blood cells

## 2017-12-07 NOTE — PROGRESS NOTES
Nephrology Progress Note      Prachi Rodriguez is a 58year old male.     HPI:   Patient presents with:  Chronic Kidney Disease       59 y/o with MM - currently on palliative tx with daratumumab, CKD - stage III (baseline Cr <2) who presents for f/u evaluatio oz/week     Cans of beer: 2 per week     Comment: rare       Medications (Active prior to today's visit):    Current Outpatient Prescriptions:  PANTOPRAZOLE SODIUM 40 MG Oral Tab EC TAKE 1 TABLET(40 MG) BY MOUTH EVERY MORNING BEFORE BREAKFAST Disp: 90 tabl 145 lb 6.4 oz  11/30/17 : 149 lb 6.4 oz  11/27/17 : 146 lb  11/09/17 : 154 lb       General: Alert and oriented in no apparent distress.  Pale  HEENT: No scleral icterus, MMM  Neck: Supple, no MACEY or thyromegaly  Cardiac: Regular rate and rhythm, S1, S2 nor mmol/L    Chloride      101 - 111 mmol/L    Carbon Dioxide, Total      22.0 - 32.0 mmol/L    Total Protein      6.1 - 8.3 g/dL    ALBUMIN, SERUM      3.50 - 4.80 g/dL    ALPHA-1 GLOBULIN      0.10 - 0.30 g/dL    ALPHA-2 GLOBULIN      0.60 - 1.00 g/dL    BE mg/dL 0.8   TOTAL PROTEIN      6.1 - 8.3 g/dL 7.2   Albumin      3.5 - 4.8 g/dL 2.2 (L)   Sodium      136 - 144 mmol/L 138   Potassium      3.6 - 5.1 mmol/L 4.3   Chloride      101 - 111 mmol/L 106   Carbon Dioxide, Total      22.0 - 32.0 mmol/L 18.0 (L)

## 2017-12-07 NOTE — PROGRESS NOTES
Education Record    Learner:  Patient and Spouse    Disease / Diagnosis: Multiple Myeloma    Barriers / Limitations:  None   Comments:    Method:  Brief focused and Discussion   Comments:    General Topics:  Medication, Side effects and symptom management

## 2017-12-15 NOTE — PROGRESS NOTES
[facility]  [address] [facility], [address] [facility] [address]  [address]  [phone] ([phone]) loss of strength/weakness (+)     HISTORY:  Past Medical History:   Diagnosis Date   • Anemia    • Bone marrow replaced by transplant 1/2015   • Cancer ([facility] [address]     MULTIPLE MYELOMA, LIGHT CHAIN, STAGE 3   • History of blood transfusion    • Hx of gastroesoph Disp:  Rfl:    Calcium Citrate-Vitamin D (CALCIUM CITRATE + D3 OR) Take by mouth. Disp:  Rfl:    Entecavir (BARACLUDE) 0.5 MG Oral Tab Take 1 tablet (0.5 mg total) by mouth daily. (Patient taking differently: Take 0.5 mg by mouth daily.  Dose decreased to Erwin Hoops normal and no polyps removed    Follow Up: 6 months     Pt seen with .     Marilu Sidhu, NP  Hepatology Nurse Practitioner   [address], [facility] [address]  [place], [place], [address]  3

## 2017-12-17 NOTE — PROGRESS NOTES
[facility] Progress Note      Patient Name: Mayi Krishnamurthy   YOB: 1955  Medical Record Number: [id number]  Attending Physician: Magali Finney M.D.      Date of Visit: 12/18/2017      Chief Complaint  Multiple myeloma, light chain l maintenance lenolidomide 5 mg daily x 21 days every 28 days. Immediately liver function increased but initially mildly. However, they then increased significantly. Hepatitis B viral load was checked and found to be lower than before lenolidomide.  As a resu dysgeusia is not eating well. He denies any new pain. No respiratory symptoms. He has no new bony pain. Performance Status   Karnofsky 80% - Normal activity, but requires effort.     Past Medical History (historical data, reviewed)  Asthma; GERD; duode (two) times daily. Disp:  Rfl:        Allergies   Mr. Arvind Perez is allergic to pcn [penicillins]; revlimid [lenalidomide]; tetracycline; and vancomycin. Review of Systems   Constitutional Fatigue.    Respiratory No dyspnea, cough, hemoptysis, pleuritic blanco QUANT   Collection Time: 12/18/17  9:35 AM   Result Value Ref Range   Immunoglobulin A <7.83 (L) 70.00 - 312.00 mg/dL   Immunoglobulin G 593 (L) 791 - 1,643 mg/dL   Immunoglobulin M <5.3 (L) 43.0 - 279.0 mg/dL   -CBC W/ DIFFERENTIAL   Collection Time: 12/1 load ordered. 4.   GERD: Patient to use pantoprazole PRN. 5.   Anemia: Due to underlying myeloma. Recommend transfusion but patient would like to wait until Thursday. 6.   Acute on chronic renal failure: Due to myeloma.  Patient is following with

## 2017-12-18 NOTE — TELEPHONE ENCOUNTER
----- Message from Re Kirk MD sent at 12/18/2017  1:15 PM CST -----  Can you make sure that he is NOT taking aspirin? He has low platelets.

## 2017-12-18 NOTE — PROGRESS NOTES
Pt here for 3 week MD f/u for multiple myeloma. Due for C2D1 Velcade today and due for Darzalex on 12/21. Pt reports no change in his SEs. See Tox.      Education Record    Learner:  Patient, spouse    Disease / Diagnosis:    Barriers / Limitations:  None

## 2017-12-21 NOTE — PROGRESS NOTES
Patient here for blood transfusion and D4C2 Darzalex/Velcade. Patient tolerating, see toxicities.      Education Record    Learner:  Patient and Spouse    Disease / Diagnosis: MM    Barriers / Limitations:  None   Comments:    Method:  Brief focused   Comme

## 2017-12-26 NOTE — PROGRESS NOTES
Patient presents with: Injection: APN assessment prior to treatment    Pt states he is feeling well and that his energy level is on an upswing from previous weeks in his treatment plan.      Education Record    Learner:  Patient and Spouse    Disease / Yodit

## 2017-12-28 NOTE — PROGRESS NOTES
SW met with patient and his wife. We went over the [email or web address] and then SW took the packet to [employer] at [employer]. As requested by patient.

## 2017-12-28 NOTE — PROGRESS NOTES
Patient presents with:  Chemotherapy: APN visit prior to visit    Pt is here with his wife for C2D11 treatment Velcade. He feels even better today than last visit - states the transfusion has made a difference and he is feeling the positive effects.  Pt is

## 2017-12-29 NOTE — PROGRESS NOTES
ANP Visit Note    Patient Name: Pedro Benton   YOB: 1955   Medical Record Number: [id number]   CSN: [id number]   Date of visit: 12/26/2017       Chief Complaint/Reason for Visit: Multiple Myeloma follow up     Oncologic History  Pedro Benton function increased but initially mildly. However, they then increased significantly. Hepatitis B viral load was checked and found to be lower than before lenolidomide. As a result it was felt that lenolidomide was the cause.  It was discontinued and the tra here for velcade today Day 8 Cycle 2 . Darzalex was held due to low blood counts. No other new complaints.        Problem List:  Patient Active Problem List:     Hx of gastroesophageal reflux (GERD)     Light chain disease, lambda type ([address]     Vitamin D d status:   Spouse name: N/A    Years of education: N/A  Number of children: N/A     Occupational History  None on file     Social History Main Topics   Smoking status: Former Smoker  1.00 Packs/day  For 10.00 Years     Quit date: 4/20/1992    Smokele (TAB-A-YURIDIA MAXIMUM) Oral Tab, Take 1 tablet by mouth daily. , Disp: , Rfl:   •  Vitamin B-12 (VITAMIN B12) 1000 MCG Oral Tab, Take 2,000 mcg by mouth daily. , Disp: , Rfl:   •  ascorbic acid (VITAMIN C) 1000 MG Oral Tab, Take 1,000 mg by mouth 2 (two) times Status: Final-Edited  Expiration Date                               Date: 12/23/2017  Value: [id number]                       Status: Final-Edited  Blood Type Barcode                            Date: 12/23/2017  Value: 5100          Status

## 2017-12-29 NOTE — PROGRESS NOTES
[facility] Progress Note    Patient Name: Jerson Loera   YOB: 1955   Medical Record Number: [id number]   CSN: [id number]   Date of visit: 12/28/2017   Provider: IZABEL Blackman  Referring Physician: No ref.  provider found    Problem underwent autologous stem cell transplant on 11/18/2014. Post-transplant bone marrow biopsy showed no evidence of residual myeloma.   Posttransplant clinical protein studies showed an M spike of 0.3 g/L.     On 06/15/2015 patient presented to [facility] uncomplicated. Cycle 12 was complicated by increased fatigue and anemia.      Laboratory studies and bone marrow at the end of 08/2017 showed progressive disease.      On 09/14/2017 patient began palliative therapy with daratumumab.  Patient failed to respo Disp: 90 tablet, Rfl: 5  •  ALLOPURINOL 100 MG Oral Tab, TAKE 1 TABLET(100 MG) BY MOUTH DAILY, Disp: 90 tablet, Rfl: 0  •  dexamethasone (DECADRON) 4 MG tablet, Take 5 tablets (20 mg) PO on days 1, 2, 4, 5, 8, 9, 11, and 12 of treatment cycle., Disp: 40 ta 101 - 111 mmol/L 112 (H)   Carbon Dioxide, Total Latest Ref Range: 22.0 - 32.0 mmol/L 20.0 (L)   BUN Latest Ref Range: 8 - 20 mg/dL 26 (H)   CREATININE Latest Ref Range: 0.70 - 1.30 mg/dL 1.82 (H)   CALCIUM Latest Ref Range: 8.3 - 10.3 mg/dL 7.2 (L)   GFR social support systems and currently there are no active problems identified. Risk level: high- malignancy, on chemotherapy, with complications associated with disease and chemo.     Arzella Saint, APN [facility] [address] [place] [address]

## 2017-12-31 PROBLEM — A41.9 SEPTIC SHOCK (HCC): Status: ACTIVE | Noted: 2017-01-01

## 2017-12-31 PROBLEM — R65.21 SEPTIC SHOCK (HCC): Status: ACTIVE | Noted: 2017-01-01

## 2017-12-31 NOTE — PROGRESS NOTES
Health system Pharmacy Note:  Renal Adjustment for meropenem (MERREM)    Garlon Galeazzi is a 58year old male who has been prescribed meropenem (MERREM) 500 mg every 8 hrs. CrCl is estimated creatinine clearance is 40.7 mL/min (based on SCr of 1.82 mg/dL (H)).  so t

## 2017-12-31 NOTE — CONSULTS
[facility]  Report of Consultation    Kamala Wells Patient Status:  Inpatient    3/6/1955 MRN [id number]   [facility] 4SW-A Attending Rita Hdz MD   Hosp Day # 0 PCP Kassi Tarango DO     Reason for Consultation:    Evaluation for showed no evidence of residual myeloma. Posttransplant clinical protein studies showed an M spike of 0.3 g/L.     On 06/15/2015 patient presented to begin maintenance therapy with lenolidomide 5 mg daily.  On that day, routine laboratory studies showed new studies and bone marrow at the end of 08/2017 showed progressive disease.      On 09/14/2017 patient began palliative therapy with daratumumab.  Patient failed to respond after two months of therapy.      On 11/27/20176 patient began palliative therapy with tab 650 mg, 650 mg, Oral, Q6H PRN **OR** acetaminophen (TYLENOL) 160 MG/5ML oral liquid 650 mg, 650 mg, Oral, Q6H PRN **OR** acetaminophen (TYLENOL) 650 MG rectal suppository 650 mg, 650 mg, Rectal, Q6H PRN  •  fentaNYL citrate (SUBLIMAZE) 1,000 mcg in sod negatives per the HPI. Physical Exam:   General: Patient is intubated and sedated.   Vital Signs: /55   Pulse 112   Temp (!) 97.3 °F (36.3 °C) (Temporal)   Resp 24   Wt 68.4 kg (150 lb 12.7 oz)   SpO2 96%   BMI 22.21 kg/m²   Neck: No palpable lymph spine,lumbar,cervical- Multiple Myeloma     Reactivation of hepatitis B viral hepatitis     Fatigue due to treatment     Hepatitis B virus infection     Multiple myeloma in relapse (HCC)     Stage III multiple myeloma (HCC)     GERD without esophagitis

## 2017-12-31 NOTE — CONSULTS
Luis Miguel Dc [address] [facility] [address] Note [facility]  Report of Consultation    Lynn Mojica Patient Status:  Inpatient    3/6/1955 MRN [id number]   [facility] 4SW-A Attending Kandice Duty, chemotherapy    • Renal disorder    • Stomach ulcer    • Visual impairment      Past Surgical History:  1/14/08: COLONOSCOPY      Comment: normal  7/1/14: IR PORT A CATH PROCEDURE      Comment: double lumen power port placed  11/2014: OTHER      Comment: s 112/60 - - 114 24 94 % -   12/31/17 0800 116/59 (!) 97.3 °F (36.3 °C) Temporal 117 12 96 % -   12/31/17 0750 131/62 - - 120 12 97 % -   12/31/17 0745 140/63 - - 117 12 97 % -   12/31/17 0730 124/59 - - 116 12 96 % -   12/31/17 0715 115/59 - - 113 19 97 % - 2+ DP/PT pulses b/l       Lab Data Review:  Recent Labs   Lab  12/28/17   0956  12/31/17   0816   GLU  105*  134*   BUN  26*  28*   CREATSERUM  1.82*  2.35*   CA  7.2*  6.0*   NA  145*  146*   K  3.4*  3.0*   CL  112*  114*   CO2  20.0*  18.0*     Recent L immunocompromised state post recent palliative chemotherapy or even simply toxic metabolic encephalopathy  · Acute on chronic renal failure - due prerenal/shock, may have development of ATN  · Anion gap acidosis, Lactic acidosis - due to above  · Leukopeni

## 2017-12-31 NOTE — PROGRESS NOTES
[facility] Pharmacy Note:  Renal Dose Adjustment    Prachi Rodriguez has been prescribed famotidine (PEPCID) 20 mg every 12 hours. Estimated Creatinine Clearance: 40.7 mL/min (based on SCr of 1.82 mg/dL (H)).     His calculated creatinine clearance is <50 ml/min,

## 2017-12-31 NOTE — H&P
[facility]  History and Physical     Thor Matute Patient Status:  Inpatient    3/6/1955 MRN [id number]   [facility] 4SW-A Attending Raeann Gary MD   Hosp Day # 0 PCP Nani Michelle DO     Chief Complaint: Respiratory distress pack-year smoking history. He has never used smokeless tobacco. He reports that he drinks about 1.2 oz of alcohol per week . He reports that he does not use drugs.     Family History:   Family History   Problem Relation Age of Onset   • Cancer Father    • H by mouth daily. Disp:  Rfl:    Multiple Vitamins-Minerals (TAB-A-YURIDIA MAXIMUM) Oral Tab Take 1 tablet by mouth daily. Disp:  Rfl:    Vitamin B-12 (VITAMIN B12) 1000 MCG Oral Tab Take 2,000 mcg by mouth daily.  Disp:  Rfl:    ascorbic acid (VITAMIN C) 1000 M blood and urine cultures. Respiratory viral panel done at [facility] was negative for influenza. Continue with goal directed IV fluid therapy, serial lactic acid. Critical care on consultation. 2. Acute Respiratory Failure  3.  Community-acquired pneum

## 2017-12-31 NOTE — PROGRESS NOTES
Patient transferred from [facility] for higher level of care. Isabel Lucas is a 63yo male being admitted with Septic Shock, Pneumonia. He was intubated at [facility] d/t worsening respiratory distress and acidosis.   He is currently sedated on Propof

## 2018-01-01 ENCOUNTER — SOCIAL WORK SERVICES (OUTPATIENT)
Dept: HEMATOLOGY/ONCOLOGY | Facility: HOSPITAL | Age: 63
End: 2018-01-01

## 2018-01-01 ENCOUNTER — PATIENT OUTREACH (OUTPATIENT)
Dept: CASE MANAGEMENT | Age: 63
End: 2018-01-01

## 2018-01-01 ENCOUNTER — HOSPITAL ENCOUNTER (OUTPATIENT)
Facility: HOSPITAL | Age: 63
Setting detail: HOSPITAL OUTPATIENT SURGERY
Discharge: HOME | End: 2018-01-01
Attending: INTERNAL MEDICINE | Admitting: INTERNAL MEDICINE
Payer: MEDICARE

## 2018-01-01 ENCOUNTER — OFFICE VISIT (OUTPATIENT)
Dept: FAMILY MEDICINE CLINIC | Facility: CLINIC | Age: 63
End: 2018-01-01

## 2018-01-01 ENCOUNTER — TELEPHONE (OUTPATIENT)
Dept: FAMILY MEDICINE CLINIC | Facility: CLINIC | Age: 63
End: 2018-01-01

## 2018-01-01 ENCOUNTER — APPOINTMENT (OUTPATIENT)
Dept: WOUND CARE | Age: 63
End: 2018-01-01
Attending: NURSE PRACTITIONER
Payer: MEDICARE

## 2018-01-01 ENCOUNTER — TELEPHONE (OUTPATIENT)
Dept: HEMATOLOGY/ONCOLOGY | Facility: HOSPITAL | Age: 63
End: 2018-01-01

## 2018-01-01 ENCOUNTER — APPOINTMENT (OUTPATIENT)
Dept: GENERAL RADIOLOGY | Facility: HOSPITAL | Age: 63
DRG: 870 | End: 2018-01-01
Attending: INTERNAL MEDICINE
Payer: MEDICARE

## 2018-01-01 ENCOUNTER — MED REC SCAN ONLY (OUTPATIENT)
Dept: FAMILY MEDICINE CLINIC | Facility: CLINIC | Age: 63
End: 2018-01-01

## 2018-01-01 ENCOUNTER — OFFICE VISIT (OUTPATIENT)
Dept: NEPHROLOGY | Facility: CLINIC | Age: 63
End: 2018-01-01

## 2018-01-01 ENCOUNTER — SNF/IP PROF CHARGE ONLY (OUTPATIENT)
Dept: HEMATOLOGY/ONCOLOGY | Facility: HOSPITAL | Age: 63
End: 2018-01-01

## 2018-01-01 ENCOUNTER — OFFICE VISIT (OUTPATIENT)
Dept: HEMATOLOGY/ONCOLOGY | Facility: HOSPITAL | Age: 63
End: 2018-01-01
Attending: SPECIALIST
Payer: MEDICARE

## 2018-01-01 ENCOUNTER — APPOINTMENT (OUTPATIENT)
Dept: GENERAL RADIOLOGY | Facility: HOSPITAL | Age: 63
DRG: 840 | End: 2018-01-01
Attending: HOSPITALIST
Payer: MEDICARE

## 2018-01-01 ENCOUNTER — HOSPITAL ENCOUNTER (INPATIENT)
Facility: HOSPITAL | Age: 63
LOS: 2 days | Discharge: HOME HEALTH | DRG: 840 | End: 2018-01-01
Attending: EMERGENCY MEDICINE | Admitting: HOSPITALIST
Payer: MEDICARE

## 2018-01-01 ENCOUNTER — APPOINTMENT (OUTPATIENT)
Dept: ULTRASOUND IMAGING | Facility: HOSPITAL | Age: 63
DRG: 870 | End: 2018-01-01
Attending: INTERNAL MEDICINE
Payer: MEDICARE

## 2018-01-01 ENCOUNTER — ANESTHESIA EVENT (OUTPATIENT)
Dept: ENDOSCOPY | Facility: HOSPITAL | Age: 63
End: 2018-01-01

## 2018-01-01 ENCOUNTER — APPOINTMENT (OUTPATIENT)
Dept: GENERAL RADIOLOGY | Facility: HOSPITAL | Age: 63
DRG: 840 | End: 2018-01-01
Attending: EMERGENCY MEDICINE
Payer: MEDICARE

## 2018-01-01 ENCOUNTER — APPOINTMENT (OUTPATIENT)
Dept: INTERVENTIONAL RADIOLOGY/VASCULAR | Facility: HOSPITAL | Age: 63
DRG: 870 | End: 2018-01-01
Attending: NURSE PRACTITIONER
Payer: MEDICARE

## 2018-01-01 ENCOUNTER — HOSPITAL ENCOUNTER (INPATIENT)
Facility: HOSPITAL | Age: 63
LOS: 6 days | Discharge: HOSPICE | DRG: 840 | End: 2018-01-01
Attending: HOSPITALIST | Admitting: HOSPITALIST
Payer: MEDICARE

## 2018-01-01 ENCOUNTER — SURGERY (OUTPATIENT)
Age: 63
End: 2018-01-01

## 2018-01-01 ENCOUNTER — OFFICE VISIT (OUTPATIENT)
Dept: WOUND CARE | Age: 63
End: 2018-01-01
Attending: NURSE PRACTITIONER
Payer: MEDICARE

## 2018-01-01 ENCOUNTER — APPOINTMENT (OUTPATIENT)
Dept: CT IMAGING | Facility: HOSPITAL | Age: 63
DRG: 870 | End: 2018-01-01
Attending: INTERNAL MEDICINE
Payer: MEDICARE

## 2018-01-01 ENCOUNTER — ANESTHESIA (OUTPATIENT)
Dept: ENDOSCOPY | Facility: HOSPITAL | Age: 63
End: 2018-01-01

## 2018-01-01 VITALS
WEIGHT: 132.25 LBS | SYSTOLIC BLOOD PRESSURE: 99 MMHG | HEIGHT: 68 IN | TEMPERATURE: 98 F | BODY MASS INDEX: 20.04 KG/M2 | RESPIRATION RATE: 20 BRPM | DIASTOLIC BLOOD PRESSURE: 53 MMHG | OXYGEN SATURATION: 96 % | HEART RATE: 85 BPM

## 2018-01-01 VITALS
TEMPERATURE: 98 F | SYSTOLIC BLOOD PRESSURE: 107 MMHG | OXYGEN SATURATION: 98 % | HEART RATE: 73 BPM | RESPIRATION RATE: 18 BRPM | DIASTOLIC BLOOD PRESSURE: 66 MMHG

## 2018-01-01 VITALS
HEIGHT: 69.02 IN | OXYGEN SATURATION: 98 % | TEMPERATURE: 98 F | BODY MASS INDEX: 20.39 KG/M2 | HEART RATE: 85 BPM | RESPIRATION RATE: 18 BRPM | DIASTOLIC BLOOD PRESSURE: 64 MMHG | WEIGHT: 137.63 LBS | SYSTOLIC BLOOD PRESSURE: 112 MMHG

## 2018-01-01 VITALS
HEIGHT: 69.02 IN | WEIGHT: 130 LBS | SYSTOLIC BLOOD PRESSURE: 80 MMHG | RESPIRATION RATE: 16 BRPM | DIASTOLIC BLOOD PRESSURE: 47 MMHG | TEMPERATURE: 98 F | BODY MASS INDEX: 19.26 KG/M2 | HEART RATE: 80 BPM | OXYGEN SATURATION: 94 %

## 2018-01-01 VITALS
WEIGHT: 136.38 LBS | HEIGHT: 69.02 IN | DIASTOLIC BLOOD PRESSURE: 58 MMHG | SYSTOLIC BLOOD PRESSURE: 102 MMHG | OXYGEN SATURATION: 99 % | RESPIRATION RATE: 18 BRPM | BODY MASS INDEX: 20.2 KG/M2 | TEMPERATURE: 99 F | HEART RATE: 87 BPM

## 2018-01-01 VITALS
WEIGHT: 128 LBS | OXYGEN SATURATION: 98 % | HEART RATE: 79 BPM | RESPIRATION RATE: 18 BRPM | SYSTOLIC BLOOD PRESSURE: 111 MMHG | HEIGHT: 69 IN | DIASTOLIC BLOOD PRESSURE: 62 MMHG | BODY MASS INDEX: 18.96 KG/M2 | TEMPERATURE: 98 F

## 2018-01-01 VITALS
BODY MASS INDEX: 20.22 KG/M2 | TEMPERATURE: 98 F | OXYGEN SATURATION: 97 % | RESPIRATION RATE: 18 BRPM | WEIGHT: 135 LBS | SYSTOLIC BLOOD PRESSURE: 144 MMHG | HEIGHT: 68.5 IN | HEART RATE: 71 BPM | DIASTOLIC BLOOD PRESSURE: 84 MMHG

## 2018-01-01 VITALS
HEART RATE: 107 BPM | WEIGHT: 128.81 LBS | BODY MASS INDEX: 19.08 KG/M2 | DIASTOLIC BLOOD PRESSURE: 46 MMHG | TEMPERATURE: 100 F | OXYGEN SATURATION: 94 % | HEIGHT: 69.02 IN | SYSTOLIC BLOOD PRESSURE: 84 MMHG | RESPIRATION RATE: 18 BRPM

## 2018-01-01 VITALS
HEART RATE: 92 BPM | WEIGHT: 142.5 LBS | SYSTOLIC BLOOD PRESSURE: 98 MMHG | HEIGHT: 69.09 IN | OXYGEN SATURATION: 94 % | DIASTOLIC BLOOD PRESSURE: 56 MMHG | TEMPERATURE: 99 F | RESPIRATION RATE: 20 BRPM | BODY MASS INDEX: 21.11 KG/M2

## 2018-01-01 VITALS
OXYGEN SATURATION: 97 % | RESPIRATION RATE: 18 BRPM | HEART RATE: 84 BPM | TEMPERATURE: 99 F | DIASTOLIC BLOOD PRESSURE: 68 MMHG | BODY MASS INDEX: 19.5 KG/M2 | SYSTOLIC BLOOD PRESSURE: 104 MMHG | WEIGHT: 131.63 LBS | HEIGHT: 69 IN

## 2018-01-01 VITALS
OXYGEN SATURATION: 98 % | HEART RATE: 70 BPM | TEMPERATURE: 98 F | DIASTOLIC BLOOD PRESSURE: 77 MMHG | SYSTOLIC BLOOD PRESSURE: 157 MMHG

## 2018-01-01 VITALS
RESPIRATION RATE: 16 BRPM | OXYGEN SATURATION: 97 % | SYSTOLIC BLOOD PRESSURE: 145 MMHG | HEART RATE: 96 BPM | DIASTOLIC BLOOD PRESSURE: 87 MMHG | TEMPERATURE: 98 F

## 2018-01-01 VITALS
HEIGHT: 69.02 IN | SYSTOLIC BLOOD PRESSURE: 135 MMHG | RESPIRATION RATE: 16 BRPM | DIASTOLIC BLOOD PRESSURE: 71 MMHG | BODY MASS INDEX: 20.01 KG/M2 | HEART RATE: 83 BPM | WEIGHT: 135.13 LBS | TEMPERATURE: 98 F | OXYGEN SATURATION: 97 %

## 2018-01-01 VITALS
SYSTOLIC BLOOD PRESSURE: 118 MMHG | WEIGHT: 135 LBS | DIASTOLIC BLOOD PRESSURE: 62 MMHG | RESPIRATION RATE: 16 BRPM | HEART RATE: 80 BPM | TEMPERATURE: 99 F | BODY MASS INDEX: 20 KG/M2

## 2018-01-01 VITALS
TEMPERATURE: 99 F | RESPIRATION RATE: 18 BRPM | DIASTOLIC BLOOD PRESSURE: 74 MMHG | BODY MASS INDEX: 20.35 KG/M2 | SYSTOLIC BLOOD PRESSURE: 117 MMHG | HEIGHT: 69.02 IN | OXYGEN SATURATION: 97 % | HEART RATE: 84 BPM | WEIGHT: 137.38 LBS

## 2018-01-01 VITALS
WEIGHT: 135 LBS | DIASTOLIC BLOOD PRESSURE: 64 MMHG | HEIGHT: 69 IN | TEMPERATURE: 99 F | SYSTOLIC BLOOD PRESSURE: 144 MMHG | BODY MASS INDEX: 19.99 KG/M2

## 2018-01-01 VITALS
OXYGEN SATURATION: 96 % | TEMPERATURE: 100 F | DIASTOLIC BLOOD PRESSURE: 58 MMHG | WEIGHT: 145.63 LBS | HEART RATE: 72 BPM | RESPIRATION RATE: 16 BRPM | BODY MASS INDEX: 21 KG/M2 | SYSTOLIC BLOOD PRESSURE: 114 MMHG

## 2018-01-01 VITALS — WEIGHT: 136 LBS | SYSTOLIC BLOOD PRESSURE: 132 MMHG | BODY MASS INDEX: 20 KG/M2 | DIASTOLIC BLOOD PRESSURE: 74 MMHG

## 2018-01-01 VITALS — HEART RATE: 72 BPM | DIASTOLIC BLOOD PRESSURE: 55 MMHG | SYSTOLIC BLOOD PRESSURE: 107 MMHG

## 2018-01-01 VITALS
HEIGHT: 69.02 IN | OXYGEN SATURATION: 97 % | SYSTOLIC BLOOD PRESSURE: 95 MMHG | DIASTOLIC BLOOD PRESSURE: 60 MMHG | HEART RATE: 100 BPM | TEMPERATURE: 97 F | WEIGHT: 128.81 LBS | BODY MASS INDEX: 19.08 KG/M2 | RESPIRATION RATE: 18 BRPM

## 2018-01-01 VITALS
DIASTOLIC BLOOD PRESSURE: 76 MMHG | HEART RATE: 78 BPM | SYSTOLIC BLOOD PRESSURE: 145 MMHG | TEMPERATURE: 98 F | OXYGEN SATURATION: 98 % | RESPIRATION RATE: 18 BRPM

## 2018-01-01 VITALS
SYSTOLIC BLOOD PRESSURE: 138 MMHG | BODY MASS INDEX: 20 KG/M2 | OXYGEN SATURATION: 98 % | TEMPERATURE: 99 F | WEIGHT: 136.19 LBS | DIASTOLIC BLOOD PRESSURE: 75 MMHG | RESPIRATION RATE: 16 BRPM | HEART RATE: 81 BPM

## 2018-01-01 VITALS — BODY MASS INDEX: 21 KG/M2 | WEIGHT: 142 LBS | SYSTOLIC BLOOD PRESSURE: 108 MMHG | DIASTOLIC BLOOD PRESSURE: 56 MMHG

## 2018-01-01 DIAGNOSIS — C90.00 MYELOMA KIDNEY (HCC): ICD-10-CM

## 2018-01-01 DIAGNOSIS — N08 MYELOMA KIDNEY (HCC): ICD-10-CM

## 2018-01-01 DIAGNOSIS — E86.0 DEHYDRATION: ICD-10-CM

## 2018-01-01 DIAGNOSIS — D61.89 ANEMIA DUE TO OTHER BONE MARROW FAILURE (HCC): ICD-10-CM

## 2018-01-01 DIAGNOSIS — C90.00 STAGE III MULTIPLE MYELOMA (HCC): ICD-10-CM

## 2018-01-01 DIAGNOSIS — C90.00 STAGE III MULTIPLE MYELOMA (HCC): Primary | ICD-10-CM

## 2018-01-01 DIAGNOSIS — C90.02 MULTIPLE MYELOMA IN RELAPSE (HCC): ICD-10-CM

## 2018-01-01 DIAGNOSIS — S51.812A LACERATION WITHOUT FOREIGN BODY OF LEFT FOREARM, INITIAL ENCOUNTER: ICD-10-CM

## 2018-01-01 DIAGNOSIS — E87.2 METABOLIC ACIDOSIS: ICD-10-CM

## 2018-01-01 DIAGNOSIS — D63.1 ANEMIA IN STAGE 4 CHRONIC KIDNEY DISEASE (HCC): Primary | ICD-10-CM

## 2018-01-01 DIAGNOSIS — N18.4 CKD (CHRONIC KIDNEY DISEASE) STAGE 4, GFR 15-29 ML/MIN (HCC): ICD-10-CM

## 2018-01-01 DIAGNOSIS — R63.0 LACK OF APPETITE: ICD-10-CM

## 2018-01-01 DIAGNOSIS — C90.02 MULTIPLE MYELOMA IN RELAPSE (HCC): Primary | ICD-10-CM

## 2018-01-01 DIAGNOSIS — N18.4 ANEMIA IN STAGE 4 CHRONIC KIDNEY DISEASE (HCC): Primary | ICD-10-CM

## 2018-01-01 DIAGNOSIS — R62.7 FAILURE TO THRIVE IN ADULT: ICD-10-CM

## 2018-01-01 DIAGNOSIS — E63.9 POOR NUTRITION: ICD-10-CM

## 2018-01-01 DIAGNOSIS — R63.4 WEIGHT LOSS: ICD-10-CM

## 2018-01-01 DIAGNOSIS — R65.21 SEPTIC SHOCK (HCC): ICD-10-CM

## 2018-01-01 DIAGNOSIS — N18.4 STAGE 4 CHRONIC KIDNEY DISEASE (HCC): ICD-10-CM

## 2018-01-01 DIAGNOSIS — A41.9 SEPTIC SHOCK (HCC): ICD-10-CM

## 2018-01-01 DIAGNOSIS — D63.1 ANEMIA IN STAGE 4 CHRONIC KIDNEY DISEASE (HCC): ICD-10-CM

## 2018-01-01 DIAGNOSIS — R53.83 FATIGUE DUE TO TREATMENT: ICD-10-CM

## 2018-01-01 DIAGNOSIS — D64.81 ANEMIA ASSOCIATED WITH CHEMOTHERAPY: ICD-10-CM

## 2018-01-01 DIAGNOSIS — N17.9 AKI (ACUTE KIDNEY INJURY) (HCC): ICD-10-CM

## 2018-01-01 DIAGNOSIS — I95.1 ORTHOSTATIC HYPOTENSION: Primary | ICD-10-CM

## 2018-01-01 DIAGNOSIS — D89.89 LIGHT CHAIN DISEASE, LAMBDA TYPE (HCC): ICD-10-CM

## 2018-01-01 DIAGNOSIS — R53.83 FATIGUE DUE TO TREATMENT: Primary | ICD-10-CM

## 2018-01-01 DIAGNOSIS — J18.9 COMMUNITY ACQUIRED PNEUMONIA: ICD-10-CM

## 2018-01-01 DIAGNOSIS — N18.30 CHRONIC RENAL IMPAIRMENT, STAGE 3 (MODERATE) (HCC): ICD-10-CM

## 2018-01-01 DIAGNOSIS — N18.4 ANEMIA IN STAGE 4 CHRONIC KIDNEY DISEASE (HCC): ICD-10-CM

## 2018-01-01 DIAGNOSIS — R19.5 LOOSE STOOLS: ICD-10-CM

## 2018-01-01 DIAGNOSIS — R53.1 WEAKNESS: ICD-10-CM

## 2018-01-01 DIAGNOSIS — S61.512A LACERATION WITHOUT FOREIGN BODY OF LEFT WRIST, INITIAL ENCOUNTER: ICD-10-CM

## 2018-01-01 DIAGNOSIS — I95.9 HYPOTENSION: Primary | ICD-10-CM

## 2018-01-01 DIAGNOSIS — I95.9 HYPOTENSION, UNSPECIFIED HYPOTENSION TYPE: Primary | ICD-10-CM

## 2018-01-01 DIAGNOSIS — N18.30 CHRONIC RENAL INSUFFICIENCY, STAGE 3 (MODERATE) (HCC): ICD-10-CM

## 2018-01-01 DIAGNOSIS — Z71.89 ENCOUNTER FOR HOSPICE CARE DISCUSSION: ICD-10-CM

## 2018-01-01 DIAGNOSIS — J18.9 NOSOCOMIAL PNEUMONIA: ICD-10-CM

## 2018-01-01 DIAGNOSIS — Z63.6 CAREGIVER BURDEN: ICD-10-CM

## 2018-01-01 DIAGNOSIS — C90.00 MULTIPLE MYELOMA NOT HAVING ACHIEVED REMISSION (HCC): Primary | ICD-10-CM

## 2018-01-01 DIAGNOSIS — N18.4 CKD (CHRONIC KIDNEY DISEASE) STAGE 4, GFR 15-29 ML/MIN (HCC): Primary | ICD-10-CM

## 2018-01-01 DIAGNOSIS — S41.111A LACERATION WITHOUT FOREIGN BODY OF RIGHT UPPER ARM, INITIAL ENCOUNTER: Primary | ICD-10-CM

## 2018-01-01 DIAGNOSIS — C90.00 MULTIPLE MYELOMA NOT HAVING ACHIEVED REMISSION (HCC): ICD-10-CM

## 2018-01-01 DIAGNOSIS — K21.9 GERD WITHOUT ESOPHAGITIS: ICD-10-CM

## 2018-01-01 DIAGNOSIS — D61.89 ANEMIA DUE TO OTHER BONE MARROW FAILURE (HCC): Primary | ICD-10-CM

## 2018-01-01 DIAGNOSIS — R53.81 DEBILITY: ICD-10-CM

## 2018-01-01 DIAGNOSIS — B19.10 REACTIVATION OF HEPATITIS B VIRAL HEPATITIS: ICD-10-CM

## 2018-01-01 DIAGNOSIS — Z87.81 HISTORY OF COMPRESSION FRACTURE OF SPINE: Primary | ICD-10-CM

## 2018-01-01 DIAGNOSIS — D64.9 ANEMIA, UNSPECIFIED TYPE: Primary | ICD-10-CM

## 2018-01-01 DIAGNOSIS — E46 PROTEIN-CALORIE MALNUTRITION, UNSPECIFIED SEVERITY (HCC): ICD-10-CM

## 2018-01-01 DIAGNOSIS — N18.4 STAGE 4 CHRONIC KIDNEY DISEASE (HCC): Primary | ICD-10-CM

## 2018-01-01 DIAGNOSIS — Z87.81 HISTORY OF COMPRESSION FRACTURE OF SPINE: ICD-10-CM

## 2018-01-01 DIAGNOSIS — R09.02 HYPOXEMIA: ICD-10-CM

## 2018-01-01 DIAGNOSIS — E16.2 HYPOGLYCEMIA: ICD-10-CM

## 2018-01-01 DIAGNOSIS — E44.0 MALNUTRITION OF MODERATE DEGREE (HCC): Primary | ICD-10-CM

## 2018-01-01 DIAGNOSIS — B16.1 ACUTE HEPATITIS B WITH DELTA-AGENT WITHOUT HEPATIC COMA: ICD-10-CM

## 2018-01-01 DIAGNOSIS — R63.0 POOR APPETITE: Primary | ICD-10-CM

## 2018-01-01 DIAGNOSIS — T45.1X5A ANEMIA ASSOCIATED WITH CHEMOTHERAPY: ICD-10-CM

## 2018-01-01 DIAGNOSIS — D69.6 THROMBOCYTOPENIA (HCC): ICD-10-CM

## 2018-01-01 DIAGNOSIS — E63.9 NUTRITIONAL DEFICIENCY: Primary | ICD-10-CM
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
## 2018-01-01 DIAGNOSIS — Z51.5 PALLIATIVE CARE BY SPECIALIST: ICD-10-CM

## 2018-01-01 DIAGNOSIS — E46 CALORIC MALNUTRITION (HCC): ICD-10-CM

## 2018-01-01 DIAGNOSIS — F32.89 OTHER DEPRESSION: ICD-10-CM

## 2018-01-01 LAB
A/G RATIO: 0.81
A/G RATIO: 0.88
A/G RATIO: 0.93
ADENOVIRUS PCR:: NEGATIVE
ALBUMIN SERPL-MCNC: 1.4 G/DL (ref 3.5–4.8)
ALBUMIN SERPL-MCNC: 1.5 G/DL (ref 3.5–4.8)
ALBUMIN SERPL-MCNC: 1.6 G/DL (ref 3.5–4.8)
ALBUMIN SERPL-MCNC: 1.6 G/DL (ref 3.5–4.8)
ALBUMIN SERPL-MCNC: 1.8 G/DL (ref 3.5–4.8)
ALBUMIN SERPL-MCNC: 1.9 G/DL (ref 3.5–4.8)
ALBUMIN SERPL-MCNC: 2 G/DL (ref 3.5–4.8)
ALBUMIN SERPL-MCNC: 2.1 G/DL (ref 3.5–4.8)
ALBUMIN SERPL-MCNC: 2.2 G/DL (ref 3.5–4.8)
ALBUMIN SERPL-MCNC: 2.4 G/DL (ref 3.5–4.8)
ALBUMIN SERPL-MCNC: 2.4 G/DL (ref 3.5–4.8)
ALBUMIN SERPL-MCNC: 2.5 G/DL (ref 3.5–4.8)
ALBUMIN SERPL-MCNC: 2.7 G/DL (ref 3.5–4.8)
ALBUMIN, SERUM: 2.75 G/DL (ref 3.5–4.8)
ALBUMIN, SERUM: 2.9 G/DL (ref 3.5–4.8)
ALBUMIN, SERUM: 3.35 G/DL (ref 3.5–4.8)
ALP LIVER SERPL-CCNC: 108 U/L (ref 45–117)
ALP LIVER SERPL-CCNC: 133 U/L (ref 45–117)
ALP LIVER SERPL-CCNC: 153 U/L (ref 45–117)
ALP LIVER SERPL-CCNC: 170 U/L (ref 45–117)
ALP LIVER SERPL-CCNC: 173 U/L (ref 45–117)
ALP LIVER SERPL-CCNC: 207 U/L (ref 45–117)
ALP LIVER SERPL-CCNC: 237 U/L (ref 45–117)
ALP LIVER SERPL-CCNC: 258 U/L (ref 45–117)
ALP LIVER SERPL-CCNC: 282 U/L (ref 45–117)
ALP LIVER SERPL-CCNC: 309 U/L (ref 45–117)
ALP LIVER SERPL-CCNC: 322 U/L (ref 45–117)
ALP LIVER SERPL-CCNC: 69 U/L (ref 45–117)
ALP LIVER SERPL-CCNC: 71 U/L (ref 45–117)
ALP LIVER SERPL-CCNC: 83 U/L (ref 45–117)
ALP LIVER SERPL-CCNC: 84 U/L (ref 45–117)
ALP LIVER SERPL-CCNC: 97 U/L (ref 45–117)
ALPHA-1 GLOBULIN: 0.22 G/DL (ref 0.1–0.3)
ALPHA-1 GLOBULIN: 0.25 G/DL (ref 0.1–0.3)
ALPHA-1 GLOBULIN: 0.26 G/DL (ref 0.1–0.3)
ALPHA-2 GLOBULIN: 0.45 G/DL (ref 0.6–1)
ALPHA-2 GLOBULIN: 0.45 G/DL (ref 0.6–1)
ALPHA-2 GLOBULIN: 1.84 G/DL (ref 0.6–1)
ALT SERPL-CCNC: 10 U/L (ref 17–63)
ALT SERPL-CCNC: 11 U/L (ref 17–63)
ALT SERPL-CCNC: 13 U/L (ref 17–63)
ALT SERPL-CCNC: 16 U/L (ref 17–63)
ALT SERPL-CCNC: 16 U/L (ref 17–63)
ALT SERPL-CCNC: 19 U/L (ref 17–63)
ALT SERPL-CCNC: 20 U/L (ref 17–63)
ALT SERPL-CCNC: 20 U/L (ref 17–63)
ALT SERPL-CCNC: 22 U/L (ref 17–63)
ALT SERPL-CCNC: 22 U/L (ref 17–63)
ALT SERPL-CCNC: 23 U/L (ref 17–63)
ALT SERPL-CCNC: 28 U/L (ref 17–63)
ALT SERPL-CCNC: 29 U/L (ref 17–63)
ALT SERPL-CCNC: 33 U/L (ref 17–63)
ALT SERPL-CCNC: 36 U/L (ref 17–63)
ALT SERPL-CCNC: 39 U/L (ref 17–63)
ANTIBODY SCREEN: NEGATIVE
ANTIBODY SCREEN: POSITIVE
APTT PPP: 29.7 SECONDS (ref 25–34)
APTT PPP: 39.4 SECONDS (ref 25–34)
APTT PPP: 43.9 SECONDS (ref 25–34)
ARTERIAL BLD GAS O2 SATURATION: 94 % (ref 92–100)
ARTERIAL BLD GAS O2 SATURATION: 95 % (ref 92–100)
ARTERIAL BLD GAS O2 SATURATION: 96 % (ref 92–100)
ARTERIAL BLOOD GAS BASE EXCESS: -10.2
ARTERIAL BLOOD GAS BASE EXCESS: -11.7
ARTERIAL BLOOD GAS BASE EXCESS: -6.1
ARTERIAL BLOOD GAS BASE EXCESS: -6.2
ARTERIAL BLOOD GAS BASE EXCESS: -7.5
ARTERIAL BLOOD GAS BASE EXCESS: 2.3
ARTERIAL BLOOD GAS BASE EXCESS: 3.2
ARTERIAL BLOOD GAS BASE EXCESS: 3.9
ARTERIAL BLOOD GAS HCO3: 13.4 MEQ/L (ref 22–26)
ARTERIAL BLOOD GAS HCO3: 15.2 MEQ/L (ref 22–26)
ARTERIAL BLOOD GAS HCO3: 17.1 MEQ/L (ref 22–26)
ARTERIAL BLOOD GAS HCO3: 18.2 MEQ/L (ref 22–26)
ARTERIAL BLOOD GAS HCO3: 18.2 MEQ/L (ref 22–26)
ARTERIAL BLOOD GAS HCO3: 25.6 MEQ/L (ref 22–26)
ARTERIAL BLOOD GAS HCO3: 26.3 MEQ/L (ref 22–26)
ARTERIAL BLOOD GAS HCO3: 27 MEQ/L (ref 22–26)
ARTERIAL BLOOD GAS PCO2: 27 MM HG (ref 35–45)
ARTERIAL BLOOD GAS PCO2: 30 MM HG (ref 35–45)
ARTERIAL BLOOD GAS PCO2: 31 MM HG (ref 35–45)
ARTERIAL BLOOD GAS PCO2: 33 MM HG (ref 35–45)
ARTERIAL BLOOD GAS PCO2: 34 MM HG (ref 35–45)
ARTERIAL BLOOD GAS PCO2: 34 MM HG (ref 35–45)
ARTERIAL BLOOD GAS PH: 7.3 (ref 7.35–7.45)
ARTERIAL BLOOD GAS PH: 7.32 (ref 7.35–7.45)
ARTERIAL BLOOD GAS PH: 7.38 (ref 7.35–7.45)
ARTERIAL BLOOD GAS PH: 7.39 (ref 7.35–7.45)
ARTERIAL BLOOD GAS PH: 7.39 (ref 7.35–7.45)
ARTERIAL BLOOD GAS PH: 7.5 (ref 7.35–7.45)
ARTERIAL BLOOD GAS PH: 7.52 (ref 7.35–7.45)
ARTERIAL BLOOD GAS PH: 7.52 (ref 7.35–7.45)
ARTERIAL BLOOD GAS PO2: 104 MM HG (ref 80–105)
ARTERIAL BLOOD GAS PO2: 105 MM HG (ref 80–105)
ARTERIAL BLOOD GAS PO2: 105 MM HG (ref 80–105)
ARTERIAL BLOOD GAS PO2: 75 MM HG (ref 80–105)
ARTERIAL BLOOD GAS PO2: 75 MM HG (ref 80–105)
ARTERIAL BLOOD GAS PO2: 78 MM HG (ref 80–105)
ARTERIAL BLOOD GAS PO2: 84 MM HG (ref 80–105)
ARTERIAL BLOOD GAS PO2: 88 MM HG (ref 80–105)
AST SERPL-CCNC: 10 U/L (ref 15–41)
AST SERPL-CCNC: 11 U/L (ref 15–41)
AST SERPL-CCNC: 13 U/L (ref 15–41)
AST SERPL-CCNC: 14 U/L (ref 15–41)
AST SERPL-CCNC: 14 U/L (ref 15–41)
AST SERPL-CCNC: 19 U/L (ref 15–41)
AST SERPL-CCNC: 26 U/L (ref 15–41)
AST SERPL-CCNC: 26 U/L (ref 15–41)
AST SERPL-CCNC: 30 U/L (ref 15–41)
AST SERPL-CCNC: 34 U/L (ref 15–41)
AST SERPL-CCNC: 34 U/L (ref 15–41)
AST SERPL-CCNC: 35 U/L (ref 15–41)
AST SERPL-CCNC: 36 U/L (ref 15–41)
AST SERPL-CCNC: 41 U/L (ref 15–41)
AST SERPL-CCNC: 50 U/L (ref 15–41)
AST SERPL-CCNC: 55 U/L (ref 15–41)
ATRIAL RATE: 101 BPM
B PERT DNA SPEC QL NAA+PROBE: NEGATIVE
BAND %: 12 %
BAND %: 13 %
BAND %: 15 %
BAND %: 18 %
BAND %: 18 %
BAND %: 19 %
BAND %: 2 %
BAND %: 2 %
BAND %: 3 %
BAND %: 3 %
BAND %: 6 %
BAND %: 8 %
BASOPHIL % MANUAL: 0 %
BASOPHIL ABSOLUTE MANUAL: 0 X10(3) UL (ref 0–0.1)
BASOPHILS # BLD AUTO: 0 X10(3) UL (ref 0–0.1)
BASOPHILS # BLD AUTO: 0.01 X10(3) UL (ref 0–0.1)
BASOPHILS # BLD AUTO: 0.02 X10(3) UL (ref 0–0.1)
BASOPHILS NFR BLD AUTO: 0 %
BASOPHILS NFR BLD AUTO: 0.1 %
BASOPHILS NFR BLD AUTO: 0.1 %
BASOPHILS NFR BLD AUTO: 0.2 %
BASOPHILS NFR BLD AUTO: 0.3 %
BASOPHILS NFR BLD AUTO: 0.3 %
BASOPHILS NFR BLD AUTO: 0.4 %
BETA GLOBULIN: 0.35 G/DL (ref 0.7–1.2)
BETA GLOBULIN: 1.8 G/DL (ref 0.7–1.2)
BETA GLOBULIN: 2.73 G/DL (ref 0.7–1.2)
BILIRUB DIRECT SERPL-MCNC: 0.5 MG/DL (ref 0.1–0.5)
BILIRUB SERPL-MCNC: 0.3 MG/DL (ref 0.1–2)
BILIRUB SERPL-MCNC: 0.5 MG/DL (ref 0.1–2)
BILIRUB SERPL-MCNC: 0.5 MG/DL (ref 0.1–2)
BILIRUB SERPL-MCNC: 0.6 MG/DL (ref 0.1–2)
BILIRUB SERPL-MCNC: 0.7 MG/DL (ref 0.1–2)
BILIRUB SERPL-MCNC: 0.8 MG/DL (ref 0.1–2)
BILIRUB SERPL-MCNC: 0.9 MG/DL (ref 0.1–2)
BILIRUB SERPL-MCNC: 1.1 MG/DL (ref 0.1–2)
BILIRUB SERPL-MCNC: 1.1 MG/DL (ref 0.1–2)
BILIRUB SERPL-MCNC: 1.4 MG/DL (ref 0.1–2)
BILIRUB SERPL-MCNC: 1.4 MG/DL (ref 0.1–2)
BILIRUB SERPL-MCNC: 1.5 MG/DL (ref 0.1–2)
BILIRUB UR QL STRIP.AUTO: NEGATIVE
BLOOD TYPE BARCODE: 5100
BLOOD TYPE BARCODE: 7300
BLOOD TYPE BARCODE: 9500
BLOOD TYPE BARCODE: 9500
BUN BLD-MCNC: 14 MG/DL (ref 8–20)
BUN BLD-MCNC: 15 MG/DL (ref 8–20)
BUN BLD-MCNC: 16 MG/DL (ref 8–20)
BUN BLD-MCNC: 16 MG/DL (ref 8–20)
BUN BLD-MCNC: 17 MG/DL (ref 8–20)
BUN BLD-MCNC: 18 MG/DL (ref 8–20)
BUN BLD-MCNC: 19 MG/DL (ref 8–20)
BUN BLD-MCNC: 19 MG/DL (ref 8–20)
BUN BLD-MCNC: 21 MG/DL (ref 8–20)
BUN BLD-MCNC: 23 MG/DL (ref 8–20)
BUN BLD-MCNC: 24 MG/DL (ref 8–20)
BUN BLD-MCNC: 26 MG/DL (ref 8–20)
BUN BLD-MCNC: 28 MG/DL (ref 8–20)
BUN BLD-MCNC: 29 MG/DL (ref 8–20)
BUN BLD-MCNC: 31 MG/DL (ref 8–20)
BUN BLD-MCNC: 32 MG/DL (ref 8–20)
BUN BLD-MCNC: 36 MG/DL (ref 8–20)
BUN BLD-MCNC: 37 MG/DL (ref 8–20)
BUN BLD-MCNC: 47 MG/DL (ref 8–20)
BUN BLD-MCNC: 50 MG/DL (ref 8–20)
BUN BLD-MCNC: 50 MG/DL (ref 8–20)
BUN BLD-MCNC: 52 MG/DL (ref 8–20)
BUN BLD-MCNC: 54 MG/DL (ref 8–20)
BUN BLD-MCNC: 58 MG/DL (ref 8–20)
BUN BLD-MCNC: 73 MG/DL (ref 8–20)
BUN BLD-MCNC: 78 MG/DL (ref 8–20)
BUN BLD-MCNC: 81 MG/DL (ref 8–20)
BUN BLD-MCNC: 93 MG/DL (ref 8–20)
C PNEUM DNA SPEC QL NAA+PROBE: NEGATIVE
CALCIUM BLD-MCNC: 10 MG/DL (ref 8.3–10.3)
CALCIUM BLD-MCNC: 10.6 MG/DL (ref 8.3–10.3)
CALCIUM BLD-MCNC: 10.8 MG/DL (ref 8.3–10.3)
CALCIUM BLD-MCNC: 11.4 MG/DL (ref 8.3–10.3)
CALCIUM BLD-MCNC: 11.5 MG/DL (ref 8.3–10.3)
CALCIUM BLD-MCNC: 12.6 MG/DL (ref 8.3–10.3)
CALCIUM BLD-MCNC: 5.7 MG/DL (ref 8.3–10.3)
CALCIUM BLD-MCNC: 6.1 MG/DL (ref 8.3–10.3)
CALCIUM BLD-MCNC: 6.1 MG/DL (ref 8.3–10.3)
CALCIUM BLD-MCNC: 6.4 MG/DL (ref 8.3–10.3)
CALCIUM BLD-MCNC: 7.2 MG/DL (ref 8.3–10.3)
CALCIUM BLD-MCNC: 7.2 MG/DL (ref 8.3–10.3)
CALCIUM BLD-MCNC: 7.3 MG/DL (ref 8.3–10.3)
CALCIUM BLD-MCNC: 7.6 MG/DL (ref 8.3–10.3)
CALCIUM BLD-MCNC: 7.6 MG/DL (ref 8.3–10.3)
CALCIUM BLD-MCNC: 7.7 MG/DL (ref 8.3–10.3)
CALCIUM BLD-MCNC: 7.8 MG/DL (ref 8.3–10.3)
CALCIUM BLD-MCNC: 8 MG/DL (ref 8.3–10.3)
CALCIUM BLD-MCNC: 8.3 MG/DL (ref 8.3–10.3)
CALCIUM BLD-MCNC: 8.5 MG/DL (ref 8.3–10.3)
CALCIUM BLD-MCNC: 8.6 MG/DL (ref 8.3–10.3)
CALCIUM BLD-MCNC: 8.7 MG/DL (ref 8.3–10.3)
CALCIUM BLD-MCNC: 9.7 MG/DL (ref 8.3–10.3)
CALCIUM BLD-MCNC: 9.8 MG/DL (ref 8.3–10.3)
CALCIUM BLD-MCNC: 9.8 MG/DL (ref 8.3–10.3)
CALCIUM BLD-MCNC: 9.9 MG/DL (ref 8.3–10.3)
CALCULATED O2 SATURATION: 94 % (ref 92–100)
CALCULATED O2 SATURATION: 96 % (ref 92–100)
CALCULATED O2 SATURATION: 96 % (ref 92–100)
CALCULATED O2 SATURATION: 97 % (ref 92–100)
CALCULATED O2 SATURATION: 97 % (ref 92–100)
CALCULATED O2 SATURATION: 98 % (ref 92–100)
CARBOXYHEMOGLOBIN: 0.9 % SAT (ref 0–3)
CARBOXYHEMOGLOBIN: 0.9 % SAT (ref 0–3)
CARBOXYHEMOGLOBIN: 1 % SAT (ref 0–3)
CARBOXYHEMOGLOBIN: 1.1 % SAT (ref 0–3)
CARBOXYHEMOGLOBIN: 1.2 % SAT (ref 0–3)
CARBOXYHEMOGLOBIN: 1.4 % SAT (ref 0–3)
CARBOXYHEMOGLOBIN: 1.5 % SAT (ref 0–3)
CARBOXYHEMOGLOBIN: 1.7 % SAT (ref 0–3)
CHLORIDE: 101 MMOL/L (ref 101–111)
CHLORIDE: 101 MMOL/L (ref 101–111)
CHLORIDE: 102 MMOL/L (ref 101–111)
CHLORIDE: 102 MMOL/L (ref 101–111)
CHLORIDE: 103 MMOL/L (ref 101–111)
CHLORIDE: 105 MMOL/L (ref 101–111)
CHLORIDE: 106 MMOL/L (ref 101–111)
CHLORIDE: 106 MMOL/L (ref 101–111)
CHLORIDE: 107 MMOL/L (ref 101–111)
CHLORIDE: 107 MMOL/L (ref 101–111)
CHLORIDE: 108 MMOL/L (ref 101–111)
CHLORIDE: 109 MMOL/L (ref 101–111)
CHLORIDE: 110 MMOL/L (ref 101–111)
CHLORIDE: 111 MMOL/L (ref 101–111)
CHLORIDE: 111 MMOL/L (ref 101–111)
CHLORIDE: 113 MMOL/L (ref 101–111)
CHLORIDE: 114 MMOL/L (ref 101–111)
CHLORIDE: 114 MMOL/L (ref 101–111)
CHLORIDE: 117 MMOL/L (ref 101–111)
CHLORIDE: 119 MMOL/L (ref 101–111)
CHLORIDE: 119 MMOL/L (ref 101–111)
CLARITY UR REFRACT.AUTO: CLEAR
CO2: 14 MMOL/L (ref 22–32)
CO2: 15 MMOL/L (ref 22–32)
CO2: 16 MMOL/L (ref 22–32)
CO2: 18 MMOL/L (ref 22–32)
CO2: 19 MMOL/L (ref 22–32)
CO2: 20 MMOL/L (ref 22–32)
CO2: 21 MMOL/L (ref 22–32)
CO2: 22 MMOL/L (ref 22–32)
CO2: 23 MMOL/L (ref 22–32)
CO2: 24 MMOL/L (ref 22–32)
CO2: 25 MMOL/L (ref 22–32)
CO2: 25 MMOL/L (ref 22–32)
CO2: 27 MMOL/L (ref 22–32)
CO2: 27 MMOL/L (ref 22–32)
COLOR UR AUTO: YELLOW
CORONAVIRUS 229E PCR:: NEGATIVE
CORONAVIRUS HKU1 PCR:: NEGATIVE
CORONAVIRUS NL63 PCR:: NEGATIVE
CORONAVIRUS OC43 PCR:: NEGATIVE
CORTISOL: 16.1 UG/DL
CORTISOL: 33.7 UG/DL
CREAT BLD-MCNC: 1.74 MG/DL (ref 0.7–1.3)
CREAT BLD-MCNC: 1.77 MG/DL (ref 0.7–1.3)
CREAT BLD-MCNC: 1.77 MG/DL (ref 0.7–1.3)
CREAT BLD-MCNC: 1.83 MG/DL (ref 0.7–1.3)
CREAT BLD-MCNC: 1.84 MG/DL (ref 0.7–1.3)
CREAT BLD-MCNC: 1.88 MG/DL (ref 0.7–1.3)
CREAT BLD-MCNC: 1.9 MG/DL (ref 0.7–1.3)
CREAT BLD-MCNC: 1.97 MG/DL (ref 0.7–1.3)
CREAT BLD-MCNC: 2.04 MG/DL (ref 0.7–1.3)
CREAT BLD-MCNC: 2.2 MG/DL (ref 0.7–1.3)
CREAT BLD-MCNC: 2.21 MG/DL (ref 0.7–1.3)
CREAT BLD-MCNC: 2.22 MG/DL (ref 0.7–1.3)
CREAT BLD-MCNC: 2.24 MG/DL (ref 0.7–1.3)
CREAT BLD-MCNC: 2.29 MG/DL (ref 0.7–1.3)
CREAT BLD-MCNC: 2.33 MG/DL (ref 0.7–1.3)
CREAT BLD-MCNC: 2.72 MG/DL (ref 0.7–1.3)
CREAT BLD-MCNC: 2.78 MG/DL (ref 0.7–1.3)
CREAT BLD-MCNC: 2.94 MG/DL (ref 0.7–1.3)
CREAT BLD-MCNC: 3.35 MG/DL (ref 0.7–1.3)
CREAT BLD-MCNC: 3.64 MG/DL (ref 0.7–1.3)
CREAT BLD-MCNC: 3.8 MG/DL (ref 0.7–1.3)
CREAT BLD-MCNC: 3.84 MG/DL (ref 0.7–1.3)
CREAT BLD-MCNC: 3.88 MG/DL (ref 0.7–1.3)
CREAT BLD-MCNC: 4.06 MG/DL (ref 0.7–1.3)
CREAT BLD-MCNC: 4.18 MG/DL (ref 0.7–1.3)
CREAT BLD-MCNC: 4.23 MG/DL (ref 0.7–1.3)
CREAT BLD-MCNC: 5.75 MG/DL (ref 0.7–1.3)
CREAT BLD-MCNC: 5.97 MG/DL (ref 0.7–1.3)
CREAT BLD-MCNC: 6.32 MG/DL (ref 0.7–1.3)
CREAT BLD-MCNC: 7.11 MG/DL (ref 0.7–1.3)
CREAT UR-SCNC: 23.9 MG/DL
CREAT UR-SCNC: 99.1 MG/DL
DOHLE BODIES: PRESENT
EOSINOPHIL # BLD AUTO: 0.01 X10(3) UL (ref 0–0.3)
EOSINOPHIL # BLD AUTO: 0.01 X10(3) UL (ref 0–0.3)
EOSINOPHIL # BLD AUTO: 0.02 X10(3) UL (ref 0–0.3)
EOSINOPHIL # BLD AUTO: 0.03 X10(3) UL (ref 0–0.3)
EOSINOPHIL # BLD AUTO: 0.04 X10(3) UL (ref 0–0.3)
EOSINOPHIL # BLD AUTO: 0.05 X10(3) UL (ref 0–0.3)
EOSINOPHIL # BLD AUTO: 0.06 X10(3) UL (ref 0–0.3)
EOSINOPHIL # BLD AUTO: 0.06 X10(3) UL (ref 0–0.3)
EOSINOPHIL # BLD AUTO: 0.07 X10(3) UL (ref 0–0.3)
EOSINOPHIL # BLD AUTO: 0.09 X10(3) UL (ref 0–0.3)
EOSINOPHIL # BLD AUTO: 0.09 X10(3) UL (ref 0–0.3)
EOSINOPHIL # BLD AUTO: 0.1 X10(3) UL (ref 0–0.3)
EOSINOPHIL % MANUAL: 0 %
EOSINOPHIL % MANUAL: 1 %
EOSINOPHIL % MANUAL: 2 %
EOSINOPHIL % MANUAL: 6 %
EOSINOPHIL ABSOLUTE MANUAL: 0 X10(3) UL (ref 0–0.3)
EOSINOPHIL ABSOLUTE MANUAL: 0.07 X10(3) UL (ref 0–0.3)
EOSINOPHIL ABSOLUTE MANUAL: 0.08 X10(3) UL (ref 0–0.3)
EOSINOPHIL ABSOLUTE MANUAL: 0.08 X10(3) UL (ref 0–0.3)
EOSINOPHIL ABSOLUTE MANUAL: 0.1 X10(3) UL (ref 0–0.3)
EOSINOPHIL ABSOLUTE MANUAL: 0.13 X10(3) UL (ref 0–0.3)
EOSINOPHIL ABSOLUTE MANUAL: 0.17 X10(3) UL (ref 0–0.3)
EOSINOPHIL NFR BLD AUTO: 0.1 %
EOSINOPHIL NFR BLD AUTO: 0.3 %
EOSINOPHIL NFR BLD AUTO: 0.5 %
EOSINOPHIL NFR BLD AUTO: 0.6 %
EOSINOPHIL NFR BLD AUTO: 0.7 %
EOSINOPHIL NFR BLD AUTO: 0.8 %
EOSINOPHIL NFR BLD AUTO: 1 %
EOSINOPHIL NFR BLD AUTO: 1.2 %
EOSINOPHIL NFR BLD AUTO: 2 %
EOSINOPHIL NFR BLD AUTO: 2.1 %
EOSINOPHIL NFR BLD AUTO: 2.3 %
EOSINOPHIL NFR BLD AUTO: 3.5 %
ERYTHROCYTE [DISTWIDTH] IN BLOOD BY AUTOMATED COUNT: 16.9 % (ref 11.5–16)
ERYTHROCYTE [DISTWIDTH] IN BLOOD BY AUTOMATED COUNT: 17.1 % (ref 11.5–16)
ERYTHROCYTE [DISTWIDTH] IN BLOOD BY AUTOMATED COUNT: 17.2 % (ref 11.5–16)
ERYTHROCYTE [DISTWIDTH] IN BLOOD BY AUTOMATED COUNT: 17.3 % (ref 11.5–16)
ERYTHROCYTE [DISTWIDTH] IN BLOOD BY AUTOMATED COUNT: 17.4 % (ref 11.5–16)
ERYTHROCYTE [DISTWIDTH] IN BLOOD BY AUTOMATED COUNT: 18.4 % (ref 11.5–16)
ERYTHROCYTE [DISTWIDTH] IN BLOOD BY AUTOMATED COUNT: 18.9 % (ref 11.5–16)
ERYTHROCYTE [DISTWIDTH] IN BLOOD BY AUTOMATED COUNT: 18.9 % (ref 11.5–16)
ERYTHROCYTE [DISTWIDTH] IN BLOOD BY AUTOMATED COUNT: 19.5 % (ref 11.5–16)
ERYTHROCYTE [DISTWIDTH] IN BLOOD BY AUTOMATED COUNT: 19.7 % (ref 11.5–16)
ERYTHROCYTE [DISTWIDTH] IN BLOOD BY AUTOMATED COUNT: 19.9 % (ref 11.5–16)
ERYTHROCYTE [DISTWIDTH] IN BLOOD BY AUTOMATED COUNT: 19.9 % (ref 11.5–16)
ERYTHROCYTE [DISTWIDTH] IN BLOOD BY AUTOMATED COUNT: 20.1 % (ref 11.5–16)
ERYTHROCYTE [DISTWIDTH] IN BLOOD BY AUTOMATED COUNT: 20.5 % (ref 11.5–16)
ERYTHROCYTE [DISTWIDTH] IN BLOOD BY AUTOMATED COUNT: 20.6 % (ref 11.5–16)
ERYTHROCYTE [DISTWIDTH] IN BLOOD BY AUTOMATED COUNT: 20.7 % (ref 11.5–16)
ERYTHROCYTE [DISTWIDTH] IN BLOOD BY AUTOMATED COUNT: 20.8 % (ref 11.5–16)
ERYTHROCYTE [DISTWIDTH] IN BLOOD BY AUTOMATED COUNT: 20.9 % (ref 11.5–16)
ERYTHROCYTE [DISTWIDTH] IN BLOOD BY AUTOMATED COUNT: 21 % (ref 11.5–16)
ERYTHROCYTE [DISTWIDTH] IN BLOOD BY AUTOMATED COUNT: 21.2 % (ref 11.5–16)
ERYTHROCYTE [DISTWIDTH] IN BLOOD BY AUTOMATED COUNT: 21.2 % (ref 11.5–16)
ERYTHROCYTE [DISTWIDTH] IN BLOOD BY AUTOMATED COUNT: 21.3 % (ref 11.5–16)
ERYTHROCYTE [DISTWIDTH] IN BLOOD BY AUTOMATED COUNT: 21.5 % (ref 11.5–16)
ERYTHROCYTE [DISTWIDTH] IN BLOOD BY AUTOMATED COUNT: 21.6 % (ref 11.5–16)
ERYTHROCYTE [DISTWIDTH] IN BLOOD BY AUTOMATED COUNT: 22.3 % (ref 11.5–16)
ERYTHROCYTE [DISTWIDTH] IN BLOOD BY AUTOMATED COUNT: 22.4 % (ref 11.5–16)
ERYTHROCYTE [DISTWIDTH] IN BLOOD BY AUTOMATED COUNT: 22.7 % (ref 11.5–16)
ERYTHROCYTE [DISTWIDTH] IN BLOOD BY AUTOMATED COUNT: 23.1 % (ref 11.5–16)
ERYTHROCYTE [DISTWIDTH] IN BLOOD BY AUTOMATED COUNT: 23.2 % (ref 11.5–16)
ERYTHROCYTE [DISTWIDTH] IN BLOOD BY AUTOMATED COUNT: 23.5 % (ref 11.5–16)
ERYTHROCYTE [DISTWIDTH] IN BLOOD BY AUTOMATED COUNT: 23.7 % (ref 11.5–16)
FIO2: 30 %
FIO2: 40 %
FLUAV RNA SPEC QL NAA+PROBE: NEGATIVE
FLUBV RNA SPEC QL NAA+PROBE: NEGATIVE
GAMMA GLOBULIN: 0.51 G/DL (ref 0.6–1.6)
GAMMA GLOBULIN: 0.71 G/DL (ref 0.6–1.6)
GAMMA GLOBULIN: 0.84 G/DL (ref 0.6–1.6)
GAMMA GLUTAMYL TRANSFERASE: 138 U/L (ref 15–85)
GLUCOSE BLD-MCNC: 101 MG/DL (ref 65–99)
GLUCOSE BLD-MCNC: 102 MG/DL (ref 65–99)
GLUCOSE BLD-MCNC: 102 MG/DL (ref 65–99)
GLUCOSE BLD-MCNC: 103 MG/DL (ref 65–99)
GLUCOSE BLD-MCNC: 104 MG/DL (ref 65–99)
GLUCOSE BLD-MCNC: 105 MG/DL (ref 70–99)
GLUCOSE BLD-MCNC: 106 MG/DL (ref 65–99)
GLUCOSE BLD-MCNC: 106 MG/DL (ref 70–99)
GLUCOSE BLD-MCNC: 106 MG/DL (ref 70–99)
GLUCOSE BLD-MCNC: 107 MG/DL (ref 70–99)
GLUCOSE BLD-MCNC: 108 MG/DL (ref 65–99)
GLUCOSE BLD-MCNC: 108 MG/DL (ref 65–99)
GLUCOSE BLD-MCNC: 109 MG/DL (ref 65–99)
GLUCOSE BLD-MCNC: 109 MG/DL (ref 70–99)
GLUCOSE BLD-MCNC: 118 MG/DL (ref 65–99)
GLUCOSE BLD-MCNC: 119 MG/DL (ref 65–99)
GLUCOSE BLD-MCNC: 120 MG/DL (ref 65–99)
GLUCOSE BLD-MCNC: 121 MG/DL (ref 65–99)
GLUCOSE BLD-MCNC: 121 MG/DL (ref 70–99)
GLUCOSE BLD-MCNC: 123 MG/DL (ref 65–99)
GLUCOSE BLD-MCNC: 125 MG/DL (ref 65–99)
GLUCOSE BLD-MCNC: 125 MG/DL (ref 70–99)
GLUCOSE BLD-MCNC: 127 MG/DL (ref 65–99)
GLUCOSE BLD-MCNC: 127 MG/DL (ref 65–99)
GLUCOSE BLD-MCNC: 128 MG/DL (ref 65–99)
GLUCOSE BLD-MCNC: 129 MG/DL (ref 65–99)
GLUCOSE BLD-MCNC: 133 MG/DL (ref 65–99)
GLUCOSE BLD-MCNC: 137 MG/DL (ref 70–99)
GLUCOSE BLD-MCNC: 139 MG/DL (ref 65–99)
GLUCOSE BLD-MCNC: 146 MG/DL (ref 65–99)
GLUCOSE BLD-MCNC: 147 MG/DL (ref 65–99)
GLUCOSE BLD-MCNC: 155 MG/DL (ref 65–99)
GLUCOSE BLD-MCNC: 156 MG/DL (ref 65–99)
GLUCOSE BLD-MCNC: 158 MG/DL (ref 65–99)
GLUCOSE BLD-MCNC: 160 MG/DL (ref 65–99)
GLUCOSE BLD-MCNC: 164 MG/DL (ref 65–99)
GLUCOSE BLD-MCNC: 164 MG/DL (ref 65–99)
GLUCOSE BLD-MCNC: 177 MG/DL (ref 65–99)
GLUCOSE BLD-MCNC: 179 MG/DL (ref 65–99)
GLUCOSE BLD-MCNC: 187 MG/DL (ref 70–99)
GLUCOSE BLD-MCNC: 47 MG/DL (ref 65–99)
GLUCOSE BLD-MCNC: 54 MG/DL (ref 70–99)
GLUCOSE BLD-MCNC: 54 MG/DL (ref 70–99)
GLUCOSE BLD-MCNC: 56 MG/DL (ref 70–99)
GLUCOSE BLD-MCNC: 58 MG/DL (ref 65–99)
GLUCOSE BLD-MCNC: 60 MG/DL (ref 65–99)
GLUCOSE BLD-MCNC: 61 MG/DL (ref 70–99)
GLUCOSE BLD-MCNC: 62 MG/DL (ref 65–99)
GLUCOSE BLD-MCNC: 62 MG/DL (ref 70–99)
GLUCOSE BLD-MCNC: 63 MG/DL (ref 65–99)
GLUCOSE BLD-MCNC: 64 MG/DL (ref 65–99)
GLUCOSE BLD-MCNC: 64 MG/DL (ref 70–99)
GLUCOSE BLD-MCNC: 65 MG/DL (ref 65–99)
GLUCOSE BLD-MCNC: 66 MG/DL (ref 65–99)
GLUCOSE BLD-MCNC: 66 MG/DL (ref 65–99)
GLUCOSE BLD-MCNC: 67 MG/DL (ref 65–99)
GLUCOSE BLD-MCNC: 67 MG/DL (ref 70–99)
GLUCOSE BLD-MCNC: 68 MG/DL (ref 65–99)
GLUCOSE BLD-MCNC: 68 MG/DL (ref 65–99)
GLUCOSE BLD-MCNC: 69 MG/DL (ref 70–99)
GLUCOSE BLD-MCNC: 70 MG/DL (ref 65–99)
GLUCOSE BLD-MCNC: 70 MG/DL (ref 70–99)
GLUCOSE BLD-MCNC: 71 MG/DL (ref 65–99)
GLUCOSE BLD-MCNC: 71 MG/DL (ref 65–99)
GLUCOSE BLD-MCNC: 72 MG/DL (ref 65–99)
GLUCOSE BLD-MCNC: 73 MG/DL (ref 65–99)
GLUCOSE BLD-MCNC: 73 MG/DL (ref 65–99)
GLUCOSE BLD-MCNC: 74 MG/DL (ref 65–99)
GLUCOSE BLD-MCNC: 74 MG/DL (ref 70–99)
GLUCOSE BLD-MCNC: 75 MG/DL (ref 65–99)
GLUCOSE BLD-MCNC: 75 MG/DL (ref 65–99)
GLUCOSE BLD-MCNC: 75 MG/DL (ref 70–99)
GLUCOSE BLD-MCNC: 76 MG/DL (ref 65–99)
GLUCOSE BLD-MCNC: 77 MG/DL (ref 65–99)
GLUCOSE BLD-MCNC: 78 MG/DL (ref 65–99)
GLUCOSE BLD-MCNC: 79 MG/DL (ref 65–99)
GLUCOSE BLD-MCNC: 80 MG/DL (ref 65–99)
GLUCOSE BLD-MCNC: 80 MG/DL (ref 65–99)
GLUCOSE BLD-MCNC: 80 MG/DL (ref 70–99)
GLUCOSE BLD-MCNC: 81 MG/DL (ref 65–99)
GLUCOSE BLD-MCNC: 81 MG/DL (ref 65–99)
GLUCOSE BLD-MCNC: 81 MG/DL (ref 70–99)
GLUCOSE BLD-MCNC: 82 MG/DL (ref 65–99)
GLUCOSE BLD-MCNC: 82 MG/DL (ref 65–99)
GLUCOSE BLD-MCNC: 83 MG/DL (ref 65–99)
GLUCOSE BLD-MCNC: 85 MG/DL (ref 65–99)
GLUCOSE BLD-MCNC: 85 MG/DL (ref 65–99)
GLUCOSE BLD-MCNC: 85 MG/DL (ref 70–99)
GLUCOSE BLD-MCNC: 86 MG/DL (ref 65–99)
GLUCOSE BLD-MCNC: 88 MG/DL (ref 65–99)
GLUCOSE BLD-MCNC: 89 MG/DL (ref 65–99)
GLUCOSE BLD-MCNC: 91 MG/DL (ref 65–99)
GLUCOSE BLD-MCNC: 91 MG/DL (ref 65–99)
GLUCOSE BLD-MCNC: 91 MG/DL (ref 70–99)
GLUCOSE BLD-MCNC: 92 MG/DL (ref 65–99)
GLUCOSE BLD-MCNC: 93 MG/DL (ref 65–99)
GLUCOSE BLD-MCNC: 93 MG/DL (ref 70–99)
GLUCOSE BLD-MCNC: 94 MG/DL (ref 65–99)
GLUCOSE BLD-MCNC: 94 MG/DL (ref 65–99)
GLUCOSE BLD-MCNC: 95 MG/DL (ref 65–99)
GLUCOSE BLD-MCNC: 96 MG/DL (ref 65–99)
GLUCOSE BLD-MCNC: 96 MG/DL (ref 65–99)
GLUCOSE BLD-MCNC: 96 MG/DL (ref 70–99)
GLUCOSE BLD-MCNC: 97 MG/DL (ref 65–99)
GLUCOSE BLD-MCNC: 97 MG/DL (ref 65–99)
GLUCOSE BLD-MCNC: 99 MG/DL (ref 70–99)
GLUCOSE BLD-MCNC: 99 MG/DL (ref 70–99)
GLUCOSE UR STRIP.AUTO-MCNC: NEGATIVE MG/DL
HAV IGM SER QL: 1.4 MG/DL (ref 1.7–3)
HAV IGM SER QL: 1.7 MG/DL (ref 1.7–3)
HAV IGM SER QL: 1.8 MG/DL (ref 1.7–3)
HAV IGM SER QL: 1.9 MG/DL (ref 1.7–3)
HAV IGM SER QL: 2 MG/DL (ref 1.7–3)
HAV IGM SER QL: 2 MG/DL (ref 1.7–3)
HAV IGM SER QL: 2.1 MG/DL (ref 1.7–3)
HAV IGM SER QL: 2.1 MG/DL (ref 1.7–3)
HAV IGM SER QL: 2.2 MG/DL (ref 1.7–3)
HAV IGM SER QL: 2.2 MG/DL (ref 1.7–3)
HAV IGM SER QL: 2.3 MG/DL (ref 1.7–3)
HAV IGM SER QL: 2.4 MG/DL (ref 1.7–3)
HCT VFR BLD AUTO: 16.7 % (ref 37–53)
HCT VFR BLD AUTO: 18.2 % (ref 37–53)
HCT VFR BLD AUTO: 18.9 % (ref 37–53)
HCT VFR BLD AUTO: 19.2 % (ref 37–53)
HCT VFR BLD AUTO: 19.4 % (ref 37–53)
HCT VFR BLD AUTO: 19.5 % (ref 37–53)
HCT VFR BLD AUTO: 19.8 % (ref 37–53)
HCT VFR BLD AUTO: 19.9 % (ref 37–53)
HCT VFR BLD AUTO: 20.4 % (ref 37–53)
HCT VFR BLD AUTO: 20.4 % (ref 37–53)
HCT VFR BLD AUTO: 20.6 % (ref 37–53)
HCT VFR BLD AUTO: 20.8 % (ref 37–53)
HCT VFR BLD AUTO: 21 % (ref 37–53)
HCT VFR BLD AUTO: 21.1 % (ref 37–53)
HCT VFR BLD AUTO: 21.4 % (ref 37–53)
HCT VFR BLD AUTO: 21.7 % (ref 37–53)
HCT VFR BLD AUTO: 21.8 % (ref 37–53)
HCT VFR BLD AUTO: 21.9 % (ref 37–53)
HCT VFR BLD AUTO: 22.1 % (ref 37–53)
HCT VFR BLD AUTO: 22.2 % (ref 37–53)
HCT VFR BLD AUTO: 22.3 % (ref 37–53)
HCT VFR BLD AUTO: 22.5 % (ref 37–53)
HCT VFR BLD AUTO: 22.8 % (ref 37–53)
HCT VFR BLD AUTO: 23.3 % (ref 37–53)
HCT VFR BLD AUTO: 23.4 % (ref 37–53)
HCT VFR BLD AUTO: 23.6 % (ref 37–53)
HCT VFR BLD AUTO: 23.9 % (ref 37–53)
HCT VFR BLD AUTO: 24.3 % (ref 37–53)
HCT VFR BLD AUTO: 24.3 % (ref 37–53)
HCT VFR BLD AUTO: 24.8 % (ref 37–53)
HCT VFR BLD AUTO: 26.3 % (ref 37–53)
HGB BLD-MCNC: 5.6 G/DL (ref 13–17)
HGB BLD-MCNC: 6 G/DL (ref 13–17)
HGB BLD-MCNC: 6.2 G/DL (ref 13–17)
HGB BLD-MCNC: 6.5 G/DL (ref 13–17)
HGB BLD-MCNC: 6.6 G/DL (ref 13–17)
HGB BLD-MCNC: 6.8 G/DL (ref 13–17)
HGB BLD-MCNC: 6.8 G/DL (ref 13–17)
HGB BLD-MCNC: 7 G/DL (ref 13–17)
HGB BLD-MCNC: 7.1 G/DL (ref 13–17)
HGB BLD-MCNC: 7.1 G/DL (ref 13–17)
HGB BLD-MCNC: 7.3 G/DL (ref 13–17)
HGB BLD-MCNC: 7.4 G/DL (ref 13–17)
HGB BLD-MCNC: 7.5 G/DL (ref 13–17)
HGB BLD-MCNC: 7.7 G/DL (ref 13–17)
HGB BLD-MCNC: 7.8 G/DL (ref 13–17)
HGB BLD-MCNC: 7.8 G/DL (ref 13–17)
HGB BLD-MCNC: 7.9 G/DL (ref 13–17)
HGB BLD-MCNC: 8 G/DL (ref 13–17)
HGB BLD-MCNC: 8 G/DL (ref 13–17)
HGB BLD-MCNC: 8.1 G/DL (ref 13–17)
HGB BLD-MCNC: 8.2 G/DL (ref 13–17)
HGB BLD-MCNC: 8.3 G/DL (ref 13–17)
HGB BLD-MCNC: 8.5 G/DL (ref 13–17)
HYALINE CASTS #/AREA URNS AUTO: PRESENT /LPF
IMMATURE GRANULOCYTE COUNT: 0.04 X10(3) UL (ref 0–1)
IMMATURE GRANULOCYTE COUNT: 0.08 X10(3) UL (ref 0–1)
IMMATURE GRANULOCYTE COUNT: 0.1 X10(3) UL (ref 0–1)
IMMATURE GRANULOCYTE COUNT: 0.11 X10(3) UL (ref 0–1)
IMMATURE GRANULOCYTE COUNT: 0.13 X10(3) UL (ref 0–1)
IMMATURE GRANULOCYTE COUNT: 0.15 X10(3) UL (ref 0–1)
IMMATURE GRANULOCYTE COUNT: 0.17 X10(3) UL (ref 0–1)
IMMATURE GRANULOCYTE COUNT: 0.19 X10(3) UL (ref 0–1)
IMMATURE GRANULOCYTE COUNT: 0.2 X10(3) UL (ref 0–1)
IMMATURE GRANULOCYTE COUNT: 0.2 X10(3) UL (ref 0–1)
IMMATURE GRANULOCYTE COUNT: 0.25 X10(3) UL (ref 0–1)
IMMATURE GRANULOCYTE RATIO %: 1.2 %
IMMATURE GRANULOCYTE RATIO %: 1.2 %
IMMATURE GRANULOCYTE RATIO %: 1.3 %
IMMATURE GRANULOCYTE RATIO %: 1.4 %
IMMATURE GRANULOCYTE RATIO %: 1.4 %
IMMATURE GRANULOCYTE RATIO %: 2.2 %
IMMATURE GRANULOCYTE RATIO %: 2.6 %
IMMATURE GRANULOCYTE RATIO %: 2.6 %
IMMATURE GRANULOCYTE RATIO %: 3 %
IMMATURE GRANULOCYTE RATIO %: 3.4 %
IMMATURE GRANULOCYTE RATIO %: 4.2 %
IMMATURE GRANULOCYTE RATIO %: 4.6 %
IMMATURE GRANULOCYTE RATIO %: 4.6 %
IMMATURE GRANULOCYTE RATIO %: 4.8 %
IMMUNOGLOBULIN A: 8.67 MG/DL (ref 70–312)
IMMUNOGLOBULIN A: <7.83 MG/DL (ref 70–312)
IMMUNOGLOBULIN G: 488 MG/DL (ref 791–1643)
IMMUNOGLOBULIN G: 602 MG/DL (ref 791–1643)
IMMUNOGLOBULIN M: <5.3 MG/DL (ref 43–279)
IMMUNOGLOBULIN M: <5.3 MG/DL (ref 43–279)
INR BLD: 1.22 (ref 0.89–1.11)
INR BLD: 1.25 (ref 0.89–1.11)
INR BLD: 1.27 (ref 0.89–1.11)
INR BLD: 1.51 (ref 0.89–1.11)
INR BLD: 1.9 (ref 0.89–1.11)
IONIZED CALCIUM: 0.92 MMOL/L (ref 1.12–1.32)
IONIZED CALCIUM: 0.99 MMOL/L (ref 1.12–1.32)
IONIZED CALCIUM: 1.32 MMOL/L (ref 1.12–1.32)
IRON SATURATION: 75 % (ref 13–45)
IRON: 83 UG/DL (ref 45–182)
KAPPA FREE LIGHT CHAIN: 0.1 MG/DL (ref 0.33–1.94)
KAPPA FREE LIGHT CHAIN: 0.42 MG/DL (ref 0.33–1.94)
KAPPA FREE LIGHT CHAIN: 0.46 MG/DL (ref 0.33–1.94)
KAPPA/LAMBDA FLC RATIO: 0.02 (ref 0.26–1.65)
KAPPA/LAMBDA FLC RATIO: 0.08 (ref 0.26–1.65)
KAPPA/LAMBDA FLC RATIO: 0.08 (ref 0.26–1.65)
KETONES UR STRIP.AUTO-MCNC: NEGATIVE MG/DL
LACTIC ACID ARTERIAL: 4 MMOL/L (ref 0.5–2)
LACTIC ACID ARTERIAL: 5.6 MMOL/L (ref 0.5–2)
LACTIC ACID: 0.6 MMOL/L (ref 0.5–2)
LACTIC ACID: 1.8 MMOL/L (ref 0.5–2)
LACTIC ACID: 2.2 MMOL/L (ref 0.5–2)
LACTIC ACID: 2.7 MMOL/L (ref 0.5–2)
LAMBDA FREE LIGHT CHAIN: 5.17 MG/DL (ref 0.57–2.63)
LAMBDA FREE LIGHT CHAIN: 5.21 MG/DL (ref 0.57–2.63)
LAMBDA FREE LIGHT CHAIN: 5.58 MG/DL (ref 0.57–2.63)
LARGE PLATELETS: PRESENT
LDH: 197 U/L (ref 84–249)
LEUKOCYTE ESTERASE UR QL STRIP.AUTO: NEGATIVE
LYMPHOCYTE % MANUAL: 1 %
LYMPHOCYTE % MANUAL: 11 %
LYMPHOCYTE % MANUAL: 17 %
LYMPHOCYTE % MANUAL: 2 %
LYMPHOCYTE % MANUAL: 2 %
LYMPHOCYTE % MANUAL: 22 %
LYMPHOCYTE % MANUAL: 25 %
LYMPHOCYTE % MANUAL: 25 %
LYMPHOCYTE % MANUAL: 27 %
LYMPHOCYTE % MANUAL: 3 %
LYMPHOCYTE % MANUAL: 4 %
LYMPHOCYTE ABSOLUTE MANUAL: 0.05 X10(3) UL (ref 0.9–4)
LYMPHOCYTE ABSOLUTE MANUAL: 0.08 X10(3) UL (ref 0.9–4)
LYMPHOCYTE ABSOLUTE MANUAL: 0.09 X10(3) UL (ref 0.9–4)
LYMPHOCYTE ABSOLUTE MANUAL: 0.12 X10(3) UL (ref 0.9–4)
LYMPHOCYTE ABSOLUTE MANUAL: 0.23 X10(3) UL (ref 0.9–4)
LYMPHOCYTE ABSOLUTE MANUAL: 0.25 X10(3) UL (ref 0.9–4)
LYMPHOCYTE ABSOLUTE MANUAL: 0.36 X10(3) UL (ref 0.9–4)
LYMPHOCYTE ABSOLUTE MANUAL: 0.62 X10(3) UL (ref 0.9–4)
LYMPHOCYTE ABSOLUTE MANUAL: 0.73 X10(3) UL (ref 0.9–4)
LYMPHOCYTE ABSOLUTE MANUAL: 0.73 X10(3) UL (ref 0.9–4)
LYMPHOCYTE ABSOLUTE MANUAL: 0.77 X10(3) UL (ref 0.9–4)
LYMPHOCYTE ABSOLUTE MANUAL: 0.88 X10(3) UL (ref 0.9–4)
LYMPHOCYTE ABSOLUTE MANUAL: 1.12 X10(3) UL (ref 0.9–4)
LYMPHOCYTE ABSOLUTE MANUAL: 1.13 X10(3) UL (ref 0.9–4)
LYMPHOCYTE ABSOLUTE MANUAL: 1.47 X10(3) UL (ref 0.9–4)
LYMPHOCYTES # BLD AUTO: 0.15 X10(3) UL (ref 0.9–4)
LYMPHOCYTES # BLD AUTO: 0.29 X10(3) UL (ref 0.9–4)
LYMPHOCYTES # BLD AUTO: 0.34 X10(3) UL (ref 0.9–4)
LYMPHOCYTES # BLD AUTO: 0.48 X10(3) UL (ref 0.9–4)
LYMPHOCYTES # BLD AUTO: 0.64 X10(3) UL (ref 0.9–4)
LYMPHOCYTES # BLD AUTO: 0.67 X10(3) UL (ref 0.9–4)
LYMPHOCYTES # BLD AUTO: 0.75 X10(3) UL (ref 0.9–4)
LYMPHOCYTES # BLD AUTO: 0.89 X10(3) UL (ref 0.9–4)
LYMPHOCYTES # BLD AUTO: 0.94 X10(3) UL (ref 0.9–4)
LYMPHOCYTES # BLD AUTO: 1.08 X10(3) UL (ref 0.9–4)
LYMPHOCYTES # BLD AUTO: 1.35 X10(3) UL (ref 0.9–4)
LYMPHOCYTES # BLD AUTO: 1.36 X10(3) UL (ref 0.9–4)
LYMPHOCYTES # BLD AUTO: 1.36 X10(3) UL (ref 0.9–4)
LYMPHOCYTES # BLD AUTO: 1.51 X10(3) UL (ref 0.9–4)
LYMPHOCYTES NFR BLD AUTO: 10.1 %
LYMPHOCYTES NFR BLD AUTO: 16.4 %
LYMPHOCYTES NFR BLD AUTO: 16.8 %
LYMPHOCYTES NFR BLD AUTO: 17.6 %
LYMPHOCYTES NFR BLD AUTO: 19.1 %
LYMPHOCYTES NFR BLD AUTO: 19.8 %
LYMPHOCYTES NFR BLD AUTO: 20 %
LYMPHOCYTES NFR BLD AUTO: 21.2 %
LYMPHOCYTES NFR BLD AUTO: 22.3 %
LYMPHOCYTES NFR BLD AUTO: 22.5 %
LYMPHOCYTES NFR BLD AUTO: 22.7 %
LYMPHOCYTES NFR BLD AUTO: 27.2 %
LYMPHOCYTES NFR BLD AUTO: 5.1 %
LYMPHOCYTES NFR BLD AUTO: 9.9 %
M PROTEIN MFR SERPL ELPH: 4.4 G/DL (ref 6.1–8.3)
M PROTEIN MFR SERPL ELPH: 4.7 G/DL (ref 6.1–8.3)
M PROTEIN MFR SERPL ELPH: 5.2 G/DL (ref 6.1–8.3)
M PROTEIN MFR SERPL ELPH: 5.3 G/DL (ref 6.1–8.3)
M PROTEIN MFR SERPL ELPH: 5.4 G/DL (ref 6.1–8.3)
M PROTEIN MFR SERPL ELPH: 5.5 G/DL (ref 6.1–8.3)
M PROTEIN MFR SERPL ELPH: 5.8 G/DL (ref 6.1–8.3)
M PROTEIN MFR SERPL ELPH: 5.8 G/DL (ref 6.1–8.3)
M PROTEIN MFR SERPL ELPH: 6.1 G/DL (ref 6.1–8.3)
M PROTEIN MFR SERPL ELPH: 6.8 G/DL (ref 6.1–8.3)
M PROTEIN MFR SERPL ELPH: 6.9 G/DL (ref 6.1–8.3)
M PROTEIN MFR SERPL ELPH: 7.4 G/DL (ref 6.1–8.3)
M PROTEIN MFR SERPL ELPH: 7.7 G/DL (ref 6.1–8.3)
M PROTEIN MFR SERPL ELPH: 8.4 G/DL (ref 6.1–8.3)
M PROTEIN MFR SERPL ELPH: 8.6 G/DL (ref 6.1–8.3)
M PROTEIN MFR SERPL ELPH: 9.3 G/DL (ref 6.1–8.3)
M-SPIKE 1: 1.11 G/DL (ref ?–0)
M-SPIKE 1: 1.68 G/DL (ref ?–0)
M-SPIKE 1: 2.21 G/DL (ref ?–0)
M-SPIKE 2: 0.27 G/DL (ref ?–0)
M-SPIKE 2: 0.37 G/DL (ref ?–0)
M-SPIKE 2: 0.42 G/DL (ref ?–0)
MCH RBC QN AUTO: 30.5 PG (ref 27–33.2)
MCH RBC QN AUTO: 30.5 PG (ref 27–33.2)
MCH RBC QN AUTO: 30.6 PG (ref 27–33.2)
MCH RBC QN AUTO: 30.7 PG (ref 27–33.2)
MCH RBC QN AUTO: 30.8 PG (ref 27–33.2)
MCH RBC QN AUTO: 30.8 PG (ref 27–33.2)
MCH RBC QN AUTO: 31 PG (ref 27–33.2)
MCH RBC QN AUTO: 31.1 PG (ref 27–33.2)
MCH RBC QN AUTO: 31.1 PG (ref 27–33.2)
MCH RBC QN AUTO: 31.3 PG (ref 27–33.2)
MCH RBC QN AUTO: 31.4 PG (ref 27–33.2)
MCH RBC QN AUTO: 31.5 PG (ref 27–33.2)
MCH RBC QN AUTO: 31.6 PG (ref 27–33.2)
MCH RBC QN AUTO: 31.6 PG (ref 27–33.2)
MCH RBC QN AUTO: 31.8 PG (ref 27–33.2)
MCH RBC QN AUTO: 31.8 PG (ref 27–33.2)
MCH RBC QN AUTO: 31.9 PG (ref 27–33.2)
MCH RBC QN AUTO: 31.9 PG (ref 27–33.2)
MCH RBC QN AUTO: 32.1 PG (ref 27–33.2)
MCH RBC QN AUTO: 32.4 PG (ref 27–33.2)
MCH RBC QN AUTO: 32.4 PG (ref 27–33.2)
MCHC RBC AUTO-ENTMCNC: 32.3 G/DL (ref 31–37)
MCHC RBC AUTO-ENTMCNC: 32.7 G/DL (ref 31–37)
MCHC RBC AUTO-ENTMCNC: 32.8 G/DL (ref 31–37)
MCHC RBC AUTO-ENTMCNC: 32.9 G/DL (ref 31–37)
MCHC RBC AUTO-ENTMCNC: 33 G/DL (ref 31–37)
MCHC RBC AUTO-ENTMCNC: 33 G/DL (ref 31–37)
MCHC RBC AUTO-ENTMCNC: 33.1 G/DL (ref 31–37)
MCHC RBC AUTO-ENTMCNC: 33.2 G/DL (ref 31–37)
MCHC RBC AUTO-ENTMCNC: 33.2 G/DL (ref 31–37)
MCHC RBC AUTO-ENTMCNC: 33.3 G/DL (ref 31–37)
MCHC RBC AUTO-ENTMCNC: 33.5 G/DL (ref 31–37)
MCHC RBC AUTO-ENTMCNC: 33.6 G/DL (ref 31–37)
MCHC RBC AUTO-ENTMCNC: 33.6 G/DL (ref 31–37)
MCHC RBC AUTO-ENTMCNC: 33.7 G/DL (ref 31–37)
MCHC RBC AUTO-ENTMCNC: 33.8 G/DL (ref 31–37)
MCHC RBC AUTO-ENTMCNC: 33.9 G/DL (ref 31–37)
MCHC RBC AUTO-ENTMCNC: 34 G/DL (ref 31–37)
MCHC RBC AUTO-ENTMCNC: 34.1 G/DL (ref 31–37)
MCHC RBC AUTO-ENTMCNC: 34.4 G/DL (ref 31–37)
MCHC RBC AUTO-ENTMCNC: 34.6 G/DL (ref 31–37)
MCHC RBC AUTO-ENTMCNC: 34.8 G/DL (ref 31–37)
MCV RBC AUTO: 87.9 FL (ref 80–99)
MCV RBC AUTO: 88 FL (ref 80–99)
MCV RBC AUTO: 90.9 FL (ref 80–99)
MCV RBC AUTO: 91 FL (ref 80–99)
MCV RBC AUTO: 91 FL (ref 80–99)
MCV RBC AUTO: 91.2 FL (ref 80–99)
MCV RBC AUTO: 91.2 FL (ref 80–99)
MCV RBC AUTO: 91.6 FL (ref 80–99)
MCV RBC AUTO: 91.7 FL (ref 80–99)
MCV RBC AUTO: 91.7 FL (ref 80–99)
MCV RBC AUTO: 91.9 FL (ref 80–99)
MCV RBC AUTO: 92 FL (ref 80–99)
MCV RBC AUTO: 92.3 FL (ref 80–99)
MCV RBC AUTO: 92.5 FL (ref 80–99)
MCV RBC AUTO: 93 FL (ref 80–99)
MCV RBC AUTO: 93.1 FL (ref 80–99)
MCV RBC AUTO: 93.2 FL (ref 80–99)
MCV RBC AUTO: 93.3 FL (ref 80–99)
MCV RBC AUTO: 93.8 FL (ref 80–99)
MCV RBC AUTO: 94.5 FL (ref 80–99)
MCV RBC AUTO: 94.8 FL (ref 80–99)
MCV RBC AUTO: 95.1 FL (ref 80–99)
MCV RBC AUTO: 95.5 FL (ref 80–99)
MCV RBC AUTO: 95.5 FL (ref 80–99)
MCV RBC AUTO: 95.7 FL (ref 80–99)
MCV RBC AUTO: 95.9 FL (ref 80–99)
MCV RBC AUTO: 96.3 FL (ref 80–99)
MCV RBC AUTO: 96.3 FL (ref 80–99)
MCV RBC AUTO: 96.7 FL (ref 80–99)
MCV RBC AUTO: 96.8 FL (ref 80–99)
MCV RBC AUTO: 98.2 FL (ref 80–99)
METAMYELOCYTE %: 1 %
METAMYELOCYTE %: 2 %
METAMYELOCYTE %: 3 %
METAMYELOCYTE ABSOLUTE MANUAL: 0.05 X10(3) UL (ref ?–0.01)
METAMYELOCYTE ABSOLUTE MANUAL: 0.05 X10(3) UL (ref ?–0.01)
METAMYELOCYTE ABSOLUTE MANUAL: 0.06 X10(3) UL (ref ?–0.01)
METAMYELOCYTE ABSOLUTE MANUAL: 0.06 X10(3) UL (ref ?–0.01)
METAMYELOCYTE ABSOLUTE MANUAL: 0.07 X10(3) UL (ref ?–0.01)
METAMYELOCYTE ABSOLUTE MANUAL: 0.12 X10(3) UL (ref ?–0.01)
METAMYELOCYTE ABSOLUTE MANUAL: 0.13 X10(3) UL (ref ?–0.01)
METAMYELOCYTE ABSOLUTE MANUAL: 0.24 X10(3) UL (ref ?–0.01)
METAPNEUMOVIRUS PCR:: NEGATIVE
METHEMOGLOBIN: 0.5 % SAT (ref 0.4–1.5)
METHEMOGLOBIN: 0.6 % SAT (ref 0.4–1.5)
METHEMOGLOBIN: 0.9 % SAT (ref 0.4–1.5)
METHEMOGLOBIN: 1 % SAT (ref 0.4–1.5)
MONOCYTE % MANUAL: 1 %
MONOCYTE % MANUAL: 1 %
MONOCYTE % MANUAL: 10 %
MONOCYTE % MANUAL: 10 %
MONOCYTE % MANUAL: 2 %
MONOCYTE % MANUAL: 3 %
MONOCYTE % MANUAL: 6 %
MONOCYTE % MANUAL: 6 %
MONOCYTE % MANUAL: 8 %
MONOCYTE % MANUAL: 9 %
MONOCYTE % MANUAL: 9 %
MONOCYTE ABSOLUTE MANUAL: 0.08 X10(3) UL (ref 0.1–0.6)
MONOCYTE ABSOLUTE MANUAL: 0.08 X10(3) UL (ref 0.1–0.6)
MONOCYTE ABSOLUTE MANUAL: 0.09 X10(3) UL (ref 0.1–0.6)
MONOCYTE ABSOLUTE MANUAL: 0.09 X10(3) UL (ref 0.1–0.6)
MONOCYTE ABSOLUTE MANUAL: 0.1 X10(3) UL (ref 0.1–1)
MONOCYTE ABSOLUTE MANUAL: 0.15 X10(3) UL (ref 0.1–0.6)
MONOCYTE ABSOLUTE MANUAL: 0.15 X10(3) UL (ref 0.1–0.6)
MONOCYTE ABSOLUTE MANUAL: 0.17 X10(3) UL (ref 0.1–1)
MONOCYTE ABSOLUTE MANUAL: 0.18 X10(3) UL (ref 0.1–0.6)
MONOCYTE ABSOLUTE MANUAL: 0.19 X10(3) UL (ref 0.1–0.6)
MONOCYTE ABSOLUTE MANUAL: 0.32 X10(3) UL (ref 0.1–1)
MONOCYTE ABSOLUTE MANUAL: 0.42 X10(3) UL (ref 0.1–1)
MONOCYTE ABSOLUTE MANUAL: 0.47 X10(3) UL (ref 0.1–1)
MONOCYTE ABSOLUTE MANUAL: 0.54 X10(3) UL (ref 0.1–1)
MONOCYTE ABSOLUTE MANUAL: 0.56 X10(3) UL (ref 0.1–1)
MONOCYTES # BLD AUTO: 0.15 X10(3) UL (ref 0.1–0.6)
MONOCYTES # BLD AUTO: 0.19 X10(3) UL (ref 0.1–0.6)
MONOCYTES # BLD AUTO: 0.2 X10(3) UL (ref 0.1–0.6)
MONOCYTES # BLD AUTO: 0.23 X10(3) UL (ref 0.1–0.6)
MONOCYTES # BLD AUTO: 0.25 X10(3) UL (ref 0.1–0.6)
MONOCYTES # BLD AUTO: 0.27 X10(3) UL (ref 0.1–0.6)
MONOCYTES # BLD AUTO: 0.37 X10(3) UL (ref 0.1–0.6)
MONOCYTES # BLD AUTO: 0.39 X10(3) UL (ref 0.1–0.6)
MONOCYTES # BLD AUTO: 0.48 X10(3) UL (ref 0.1–0.6)
MONOCYTES # BLD AUTO: 0.56 X10(3) UL (ref 0.1–0.6)
MONOCYTES # BLD AUTO: 0.58 X10(3) UL (ref 0.1–1)
MONOCYTES # BLD AUTO: 0.61 X10(3) UL (ref 0.1–0.6)
MONOCYTES NFR BLD AUTO: 11.6 %
MONOCYTES NFR BLD AUTO: 5.2 %
MONOCYTES NFR BLD AUTO: 5.8 %
MONOCYTES NFR BLD AUTO: 6 %
MONOCYTES NFR BLD AUTO: 6.2 %
MONOCYTES NFR BLD AUTO: 6.5 %
MONOCYTES NFR BLD AUTO: 6.7 %
MONOCYTES NFR BLD AUTO: 7.1 %
MONOCYTES NFR BLD AUTO: 7.2 %
MONOCYTES NFR BLD AUTO: 7.7 %
MONOCYTES NFR BLD AUTO: 7.7 %
MONOCYTES NFR BLD AUTO: 8.1 %
MONOCYTES NFR BLD AUTO: 8.1 %
MONOCYTES NFR BLD AUTO: 8.9 %
MORPHOLOGY: NORMAL
MYCOPLASMA PNEUMONIA PCR:: NEGATIVE
MYELOCYTE %: 1 %
MYELOCYTE %: 2 %
MYELOCYTE %: 2 %
MYELOCYTE ABSOLUTE MANUAL: 0.04 X10(3) UL (ref ?–0.01)
MYELOCYTE ABSOLUTE MANUAL: 0.1 X10(3) UL (ref ?–0.01)
MYELOCYTE ABSOLUTE MANUAL: 0.12 X10(3) UL (ref ?–0.01)
NEUTROPHIL ABS PRELIM: 1.61 X10 (3) UL (ref 1.3–6.7)
NEUTROPHIL ABS PRELIM: 1.8 X10 (3) UL (ref 1.3–6.7)
NEUTROPHIL ABS PRELIM: 1.81 X10 (3) UL (ref 1.3–6.7)
NEUTROPHIL ABS PRELIM: 1.88 X10 (3) UL (ref 1.3–6.7)
NEUTROPHIL ABS PRELIM: 1.93 X10 (3) UL (ref 1.3–6.7)
NEUTROPHIL ABS PRELIM: 1.94 X10 (3) UL (ref 1.3–6.7)
NEUTROPHIL ABS PRELIM: 1.99 X10 (3) UL (ref 1.3–6.7)
NEUTROPHIL ABS PRELIM: 2.27 X10 (3) UL (ref 1.3–6.7)
NEUTROPHIL ABS PRELIM: 2.3 X10 (3) UL (ref 1.3–6.7)
NEUTROPHIL ABS PRELIM: 2.39 X10 (3) UL (ref 1.3–6.7)
NEUTROPHIL ABS PRELIM: 2.46 X10 (3) UL (ref 1.3–6.7)
NEUTROPHIL ABS PRELIM: 2.61 X10 (3) UL (ref 1.3–6.7)
NEUTROPHIL ABS PRELIM: 2.71 X10 (3) UL (ref 1.3–6.7)
NEUTROPHIL ABS PRELIM: 2.85 X10 (3) UL (ref 1.3–6.7)
NEUTROPHIL ABS PRELIM: 2.86 X10 (3) UL (ref 1.3–6.7)
NEUTROPHIL ABS PRELIM: 2.91 X10 (3) UL (ref 1.3–6.7)
NEUTROPHIL ABS PRELIM: 3.12 X10 (3) UL (ref 1.3–6.7)
NEUTROPHIL ABS PRELIM: 3.15 X10 (3) UL (ref 1.3–6.7)
NEUTROPHIL ABS PRELIM: 3.33 X10 (3) UL (ref 1.3–6.7)
NEUTROPHIL ABS PRELIM: 3.37 X10 (3) UL (ref 1.3–6.7)
NEUTROPHIL ABS PRELIM: 3.63 X10 (3) UL (ref 1.3–6.7)
NEUTROPHIL ABS PRELIM: 4.79 X10 (3) UL (ref 1.3–6.7)
NEUTROPHIL ABS PRELIM: 4.98 X10 (3) UL (ref 1.3–6.7)
NEUTROPHIL ABS PRELIM: 5.56 X10 (3) UL (ref 1.3–6.7)
NEUTROPHIL ABS PRELIM: 5.58 X10 (3) UL (ref 1.3–6.7)
NEUTROPHIL ABS PRELIM: 7.09 X10 (3) UL (ref 1.3–6.7)
NEUTROPHIL ABS PRELIM: 7.24 X10 (3) UL (ref 1.3–6.7)
NEUTROPHIL ABS PRELIM: 7.74 X10 (3) UL (ref 1.3–6.7)
NEUTROPHIL ABSOLUTE MANUAL: 1.91 X10(3) UL (ref 1.3–6.7)
NEUTROPHIL ABSOLUTE MANUAL: 1.94 X10(3) UL (ref 1.3–6.7)
NEUTROPHIL ABSOLUTE MANUAL: 2.24 X10(3) UL (ref 1.3–6.7)
NEUTROPHIL ABSOLUTE MANUAL: 2.25 X10(3) UL (ref 1.3–6.7)
NEUTROPHIL ABSOLUTE MANUAL: 2.56 X10(3) UL (ref 1.3–6.7)
NEUTROPHIL ABSOLUTE MANUAL: 2.76 X10(3) UL (ref 1.3–6.7)
NEUTROPHIL ABSOLUTE MANUAL: 3.72 X10(3) UL (ref 1.3–6.7)
NEUTROPHIL ABSOLUTE MANUAL: 3.74 X10(3) UL (ref 1.3–6.7)
NEUTROPHIL ABSOLUTE MANUAL: 4.42 X10(3) UL (ref 1.3–6.7)
NEUTROPHIL ABSOLUTE MANUAL: 4.42 X10(3) UL (ref 1.3–6.7)
NEUTROPHIL ABSOLUTE MANUAL: 5.64 X10(3) UL (ref 1.3–6.7)
NEUTROPHIL ABSOLUTE MANUAL: 7.14 X10(3) UL (ref 1.3–6.7)
NEUTROPHIL ABSOLUTE MANUAL: 7.7 X10(3) UL (ref 1.3–6.7)
NEUTROPHIL ABSOLUTE MANUAL: 7.97 X10(3) UL (ref 1.3–6.7)
NEUTROPHIL ABSOLUTE MANUAL: 8.37 X10(3) UL (ref 1.3–6.7)
NEUTROPHILS # BLD AUTO: 1.88 X10(3) UL (ref 1.3–6.7)
NEUTROPHILS # BLD AUTO: 1.94 X10(3) UL (ref 1.3–6.7)
NEUTROPHILS # BLD AUTO: 1.99 X10(3) UL (ref 1.3–6.7)
NEUTROPHILS # BLD AUTO: 2.39 X10(3) UL (ref 1.3–6.7)
NEUTROPHILS # BLD AUTO: 2.46 X10(3) UL (ref 1.3–6.7)
NEUTROPHILS # BLD AUTO: 2.71 X10(3) UL (ref 1.3–6.7)
NEUTROPHILS # BLD AUTO: 2.85 X10(3) UL (ref 1.3–6.7)
NEUTROPHILS # BLD AUTO: 2.86 X10(3) UL (ref 1.3–6.7)
NEUTROPHILS # BLD AUTO: 2.91 X10(3) UL (ref 1.3–6.7)
NEUTROPHILS # BLD AUTO: 3.15 X10(3) UL (ref 1.3–6.7)
NEUTROPHILS # BLD AUTO: 3.63 X10(3) UL (ref 1.3–6.7)
NEUTROPHILS # BLD AUTO: 4.79 X10(3) UL (ref 1.3–6.7)
NEUTROPHILS # BLD AUTO: 5.56 X10(3) UL (ref 1.3–6.7)
NEUTROPHILS # BLD AUTO: 5.58 X10(3) UL (ref 1.3–6.7)
NEUTROPHILS % MANUAL: 61 %
NEUTROPHILS % MANUAL: 62 %
NEUTROPHILS % MANUAL: 63 %
NEUTROPHILS % MANUAL: 64 %
NEUTROPHILS % MANUAL: 65 %
NEUTROPHILS % MANUAL: 66 %
NEUTROPHILS % MANUAL: 66 %
NEUTROPHILS % MANUAL: 72 %
NEUTROPHILS % MANUAL: 72 %
NEUTROPHILS % MANUAL: 74 %
NEUTROPHILS % MANUAL: 79 %
NEUTROPHILS % MANUAL: 80 %
NEUTROPHILS % MANUAL: 82 %
NEUTROPHILS % MANUAL: 83 %
NEUTROPHILS % MANUAL: 92 %
NEUTROPHILS NFR BLD AUTO: 57.2 %
NEUTROPHILS NFR BLD AUTO: 65.3 %
NEUTROPHILS NFR BLD AUTO: 65.9 %
NEUTROPHILS NFR BLD AUTO: 66.4 %
NEUTROPHILS NFR BLD AUTO: 67.3 %
NEUTROPHILS NFR BLD AUTO: 68 %
NEUTROPHILS NFR BLD AUTO: 69.1 %
NEUTROPHILS NFR BLD AUTO: 69.3 %
NEUTROPHILS NFR BLD AUTO: 69.9 %
NEUTROPHILS NFR BLD AUTO: 70.5 %
NEUTROPHILS NFR BLD AUTO: 72.3 %
NEUTROPHILS NFR BLD AUTO: 82.5 %
NEUTROPHILS NFR BLD AUTO: 82.8 %
NEUTROPHILS NFR BLD AUTO: 83 %
NITRITE UR QL STRIP.AUTO: NEGATIVE
NRBC CALCULATED: 1
NRBC CALCULATED: 2
OSMOLALITY URINE: 270 MOSM/KG (ref 300–1300)
P AXIS: 70 DEGREES
P-R INTERVAL: 156 MS
P/F RATIO: 195.1 MMHG
P/F RATIO: 265.2 MMHG
P/F RATIO: 267.4 MMHG
P/F RATIO: 350 MMHG
PARAINFLUENZA 1 PCR:: NEGATIVE
PARAINFLUENZA 2 PCR:: NEGATIVE
PARAINFLUENZA 3 PCR:: NEGATIVE
PARAINFLUENZA 4 PCR:: NEGATIVE
PATIENT TEMPERATURE: 97.9 F
PATIENT TEMPERATURE: 97.9 F
PATIENT TEMPERATURE: 98 F
PATIENT TEMPERATURE: 98 F
PATIENT TEMPERATURE: 98.6 F
PATIENT TEMPERATURE: 98.7 F
PEEP: 450 CM H2O
PEEP: 5 CM H2O
PEEP: 500 CM H2O
PH UR STRIP.AUTO: 5 [PH] (ref 4.5–8)
PH UR STRIP.AUTO: 7 [PH] (ref 4.5–8)
PH UR STRIP.AUTO: 8 [PH] (ref 4.5–8)
PH UR STRIP.AUTO: 8 [PH] (ref 4.5–8)
PHOSPHATE SERPL-MCNC: 0.8 MG/DL (ref 2.5–4.9)
PHOSPHATE SERPL-MCNC: 1.5 MG/DL (ref 2.5–4.9)
PHOSPHATE SERPL-MCNC: 1.7 MG/DL (ref 2.5–4.9)
PHOSPHATE SERPL-MCNC: 1.8 MG/DL (ref 2.5–4.9)
PHOSPHATE SERPL-MCNC: 2.1 MG/DL (ref 2.5–4.9)
PHOSPHATE SERPL-MCNC: 2.4 MG/DL (ref 2.5–4.9)
PHOSPHATE SERPL-MCNC: 2.4 MG/DL (ref 2.5–4.9)
PHOSPHATE SERPL-MCNC: 2.7 MG/DL (ref 2.5–4.9)
PHOSPHATE SERPL-MCNC: 2.7 MG/DL (ref 2.5–4.9)
PHOSPHATE SERPL-MCNC: 2.8 MG/DL (ref 2.5–4.9)
PHOSPHATE SERPL-MCNC: 2.9 MG/DL (ref 2.5–4.9)
PHOSPHATE SERPL-MCNC: 3.3 MG/DL (ref 2.5–4.9)
PHOSPHATE SERPL-MCNC: 3.5 MG/DL (ref 2.5–4.9)
PHOSPHATE SERPL-MCNC: 3.8 MG/DL (ref 2.5–4.9)
PHOSPHATE SERPL-MCNC: 4.5 MG/DL (ref 2.5–4.9)
PHOSPHATE SERPL-MCNC: 5.1 MG/DL (ref 2.5–4.9)
PHOSPHATE SERPL-MCNC: 6.1 MG/DL (ref 2.5–4.9)
PLATELET # BLD AUTO: 136 10(3)UL (ref 150–450)
PLATELET # BLD AUTO: 14 10(3)UL (ref 150–450)
PLATELET # BLD AUTO: 20 10(3)UL (ref 150–450)
PLATELET # BLD AUTO: 24 10(3)UL (ref 150–450)
PLATELET # BLD AUTO: 28 10(3)UL (ref 150–450)
PLATELET # BLD AUTO: 29 10(3)UL (ref 150–450)
PLATELET # BLD AUTO: 34 10(3)UL (ref 150–450)
PLATELET # BLD AUTO: 35 10(3)UL (ref 150–450)
PLATELET # BLD AUTO: 35 10(3)UL (ref 150–450)
PLATELET # BLD AUTO: 36 10(3)UL (ref 150–450)
PLATELET # BLD AUTO: 36 10(3)UL (ref 150–450)
PLATELET # BLD AUTO: 37 10(3)UL (ref 150–450)
PLATELET # BLD AUTO: 42 10(3)UL (ref 150–450)
PLATELET # BLD AUTO: 49 10(3)UL (ref 150–450)
PLATELET # BLD AUTO: 50 10(3)UL (ref 150–450)
PLATELET # BLD AUTO: 51 10(3)UL (ref 150–450)
PLATELET # BLD AUTO: 51 10(3)UL (ref 150–450)
PLATELET # BLD AUTO: 52 10(3)UL (ref 150–450)
PLATELET # BLD AUTO: 52 10(3)UL (ref 150–450)
PLATELET # BLD AUTO: 53 10(3)UL (ref 150–450)
PLATELET # BLD AUTO: 54 10(3)UL (ref 150–450)
PLATELET # BLD AUTO: 57 10(3)UL (ref 150–450)
PLATELET # BLD AUTO: 57 10(3)UL (ref 150–450)
PLATELET # BLD AUTO: 60 10(3)UL (ref 150–450)
PLATELET # BLD AUTO: 60 10(3)UL (ref 150–450)
PLATELET # BLD AUTO: 67 10(3)UL (ref 150–450)
PLATELET # BLD AUTO: 67 10(3)UL (ref 150–450)
PLATELET # BLD AUTO: 74 10(3)UL (ref 150–450)
PLATELET # BLD AUTO: 75 10(3)UL (ref 150–450)
PLATELET MORPHOLOGY: NORMAL
POTASSIUM BLOOD GAS: 3.5 MMOL/L (ref 3.6–5.1)
POTASSIUM SERPL-SCNC: 3 MMOL/L (ref 3.6–5.1)
POTASSIUM SERPL-SCNC: 3.1 MMOL/L (ref 3.6–5.1)
POTASSIUM SERPL-SCNC: 3.3 MMOL/L (ref 3.6–5.1)
POTASSIUM SERPL-SCNC: 3.4 MMOL/L (ref 3.6–5.1)
POTASSIUM SERPL-SCNC: 3.5 MMOL/L (ref 3.6–5.1)
POTASSIUM SERPL-SCNC: 3.6 MMOL/L (ref 3.6–5.1)
POTASSIUM SERPL-SCNC: 3.7 MMOL/L (ref 3.6–5.1)
POTASSIUM SERPL-SCNC: 3.8 MMOL/L (ref 3.6–5.1)
POTASSIUM SERPL-SCNC: 3.9 MMOL/L (ref 3.6–5.1)
POTASSIUM SERPL-SCNC: 4.1 MMOL/L (ref 3.6–5.1)
POTASSIUM SERPL-SCNC: 4.2 MMOL/L (ref 3.6–5.1)
POTASSIUM SERPL-SCNC: 4.3 MMOL/L (ref 3.6–5.1)
POTASSIUM SERPL-SCNC: 4.4 MMOL/L (ref 3.6–5.1)
POTASSIUM SERPL-SCNC: 4.4 MMOL/L (ref 3.6–5.1)
POTASSIUM SERPL-SCNC: 4.7 MMOL/L (ref 3.6–5.1)
POTASSIUM SERPL-SCNC: 4.9 MMOL/L (ref 3.6–5.1)
POTASSIUM SERPL-SCNC: 5 MMOL/L (ref 3.6–5.1)
POTASSIUM SERPL-SCNC: 5.1 MMOL/L (ref 3.6–5.1)
POTASSIUM SERPL-SCNC: 5.9 MMOL/L (ref 3.6–5.1)
PRESSURE SUPPORT: 5 CM H2O
PROCALCITONIN SERPL-MCNC: 2.61 NG/ML (ref ?–0.11)
PROCALCITONIN SERPL-MCNC: 28.27 NG/ML (ref ?–0.11)
PROT UR STRIP.AUTO-MCNC: 100 MG/DL
PROT UR STRIP.AUTO-MCNC: 30 MG/DL
PSA SERPL DL<=0.01 NG/ML-MCNC: 15.5 SECONDS (ref 12–14.3)
PSA SERPL DL<=0.01 NG/ML-MCNC: 15.7 SECONDS (ref 12–14.3)
PSA SERPL DL<=0.01 NG/ML-MCNC: 16 SECONDS (ref 12–14.3)
PSA SERPL DL<=0.01 NG/ML-MCNC: 18.4 SECONDS (ref 12–14.3)
PSA SERPL DL<=0.01 NG/ML-MCNC: 22.1 SECONDS (ref 12–14.3)
PTH-INTACT SERPL-MCNC: <6.3 PG/ML (ref 11.1–79.5)
Q-T INTERVAL: 380 MS
QRS DURATION: 60 MS
QTC CALCULATION (BEZET): 492 MS
R AXIS: 31 DEGREES
RBC # BLD AUTO: 1.78 X10(6)UL (ref 4.3–5.7)
RBC # BLD AUTO: 1.88 X10(6)UL (ref 4.3–5.7)
RBC # BLD AUTO: 1.98 X10(6)UL (ref 4.3–5.7)
RBC # BLD AUTO: 2.04 X10(6)UL (ref 4.3–5.7)
RBC # BLD AUTO: 2.06 X10(6)UL (ref 4.3–5.7)
RBC # BLD AUTO: 2.09 X10(6)UL (ref 4.3–5.7)
RBC # BLD AUTO: 2.1 X10(6)UL (ref 4.3–5.7)
RBC # BLD AUTO: 2.13 X10(6)UL (ref 4.3–5.7)
RBC # BLD AUTO: 2.14 X10(6)UL (ref 4.3–5.7)
RBC # BLD AUTO: 2.18 X10(6)UL (ref 4.3–5.7)
RBC # BLD AUTO: 2.2 X10(6)UL (ref 4.3–5.7)
RBC # BLD AUTO: 2.21 X10(6)UL (ref 4.3–5.7)
RBC # BLD AUTO: 2.25 X10(6)UL (ref 4.3–5.7)
RBC # BLD AUTO: 2.26 X10(6)UL (ref 4.3–5.7)
RBC # BLD AUTO: 2.32 X10(6)UL (ref 4.3–5.7)
RBC # BLD AUTO: 2.38 X10(6)UL (ref 4.3–5.7)
RBC # BLD AUTO: 2.39 X10(6)UL (ref 4.3–5.7)
RBC # BLD AUTO: 2.41 X10(6)UL (ref 4.3–5.7)
RBC # BLD AUTO: 2.41 X10(6)UL (ref 4.3–5.7)
RBC # BLD AUTO: 2.42 X10(6)UL (ref 4.3–5.7)
RBC # BLD AUTO: 2.42 X10(6)UL (ref 4.3–5.7)
RBC # BLD AUTO: 2.44 X10(6)UL (ref 4.3–5.7)
RBC # BLD AUTO: 2.45 X10(6)UL (ref 4.3–5.7)
RBC # BLD AUTO: 2.45 X10(6)UL (ref 4.3–5.7)
RBC # BLD AUTO: 2.53 X10(6)UL (ref 4.3–5.7)
RBC # BLD AUTO: 2.54 X10(6)UL (ref 4.3–5.7)
RBC # BLD AUTO: 2.59 X10(6)UL (ref 4.3–5.7)
RBC # BLD AUTO: 2.61 X10(6)UL (ref 4.3–5.7)
RBC # BLD AUTO: 2.62 X10(6)UL (ref 4.3–5.7)
RBC # BLD AUTO: 2.7 X10(6)UL (ref 4.3–5.7)
RBC # BLD AUTO: 2.73 X10(6)UL (ref 4.3–5.7)
RBC UR QL AUTO: NEGATIVE
RED CELL DISTRIBUTION WIDTH-SD: 53.1 FL (ref 35.1–46.3)
RED CELL DISTRIBUTION WIDTH-SD: 53.6 FL (ref 35.1–46.3)
RED CELL DISTRIBUTION WIDTH-SD: 56.8 FL (ref 35.1–46.3)
RED CELL DISTRIBUTION WIDTH-SD: 57.4 FL (ref 35.1–46.3)
RED CELL DISTRIBUTION WIDTH-SD: 57.6 FL (ref 35.1–46.3)
RED CELL DISTRIBUTION WIDTH-SD: 58.1 FL (ref 35.1–46.3)
RED CELL DISTRIBUTION WIDTH-SD: 58.4 FL (ref 35.1–46.3)
RED CELL DISTRIBUTION WIDTH-SD: 62.4 FL (ref 35.1–46.3)
RED CELL DISTRIBUTION WIDTH-SD: 65.3 FL (ref 35.1–46.3)
RED CELL DISTRIBUTION WIDTH-SD: 65.4 FL (ref 35.1–46.3)
RED CELL DISTRIBUTION WIDTH-SD: 66.1 FL (ref 35.1–46.3)
RED CELL DISTRIBUTION WIDTH-SD: 66.4 FL (ref 35.1–46.3)
RED CELL DISTRIBUTION WIDTH-SD: 66.7 FL (ref 35.1–46.3)
RED CELL DISTRIBUTION WIDTH-SD: 67.5 FL (ref 35.1–46.3)
RED CELL DISTRIBUTION WIDTH-SD: 67.6 FL (ref 35.1–46.3)
RED CELL DISTRIBUTION WIDTH-SD: 68.1 FL (ref 35.1–46.3)
RED CELL DISTRIBUTION WIDTH-SD: 68.2 FL (ref 35.1–46.3)
RED CELL DISTRIBUTION WIDTH-SD: 68.5 FL (ref 35.1–46.3)
RED CELL DISTRIBUTION WIDTH-SD: 68.6 FL (ref 35.1–46.3)
RED CELL DISTRIBUTION WIDTH-SD: 68.8 FL (ref 35.1–46.3)
RED CELL DISTRIBUTION WIDTH-SD: 69.6 FL (ref 35.1–46.3)
RED CELL DISTRIBUTION WIDTH-SD: 69.9 FL (ref 35.1–46.3)
RED CELL DISTRIBUTION WIDTH-SD: 70.4 FL (ref 35.1–46.3)
RED CELL DISTRIBUTION WIDTH-SD: 70.5 FL (ref 35.1–46.3)
RED CELL DISTRIBUTION WIDTH-SD: 71 FL (ref 35.1–46.3)
RED CELL DISTRIBUTION WIDTH-SD: 71.3 FL (ref 35.1–46.3)
RED CELL DISTRIBUTION WIDTH-SD: 71.9 FL (ref 35.1–46.3)
RED CELL DISTRIBUTION WIDTH-SD: 72.3 FL (ref 35.1–46.3)
RED CELL DISTRIBUTION WIDTH-SD: 74.4 FL (ref 35.1–46.3)
RED CELL DISTRIBUTION WIDTH-SD: 76 FL (ref 35.1–46.3)
RED CELL DISTRIBUTION WIDTH-SD: 78.7 FL (ref 35.1–46.3)
RED CELL DISTRIBUTION WIDTH-SD: 78.8 FL (ref 35.1–46.3)
RED CELL DISTRIBUTION WIDTH-SD: 81.4 FL (ref 35.1–46.3)
RGTSCRN: 2
RH BLOOD TYPE: POSITIVE
RHINOVIRUS/ENTERO PCR:: NEGATIVE
RSV RNA SPEC QL NAA+PROBE: NEGATIVE
SODIUM BLOOD GAS: 143 MMOL/L (ref 136–144)
SODIUM SERPL-SCNC: 133 MMOL/L (ref 136–144)
SODIUM SERPL-SCNC: 136 MMOL/L (ref 136–144)
SODIUM SERPL-SCNC: 136 MMOL/L (ref 136–144)
SODIUM SERPL-SCNC: 137 MMOL/L (ref 136–144)
SODIUM SERPL-SCNC: 138 MMOL/L (ref 136–144)
SODIUM SERPL-SCNC: 139 MMOL/L (ref 136–144)
SODIUM SERPL-SCNC: 140 MMOL/L (ref 136–144)
SODIUM SERPL-SCNC: 141 MMOL/L (ref 136–144)
SODIUM SERPL-SCNC: 142 MMOL/L (ref 136–144)
SODIUM SERPL-SCNC: 143 MMOL/L (ref 136–144)
SODIUM SERPL-SCNC: 144 MMOL/L (ref 136–144)
SODIUM SERPL-SCNC: 145 MMOL/L (ref 136–144)
SODIUM SERPL-SCNC: 146 MMOL/L (ref 136–144)
SODIUM SERPL-SCNC: 146 MMOL/L (ref 136–144)
SODIUM SERPL-SCNC: 147 MMOL/L (ref 136–144)
SODIUM SERPL-SCNC: 24 MMOL/L
SODIUM SERPL-SCNC: 85 MMOL/L
SP GR UR STRIP.AUTO: 1.01 (ref 1–1.03)
STREPTOCOCCUS PNEUMONIAE AG, U: POSITIVE
T AXIS: 48 DEGREES
TIDAL VOLUME: 24 ML
TIDAL VOLUME: 30 ML
TIDAL VOLUME: 450 ML
TOTAL CELLS COUNTED: 100
TOTAL CELLS COUNTED: 50
TOTAL HEMOGLOBIN: 6.5 G/DL (ref 13.2–17.3)
TOTAL HEMOGLOBIN: 6.6 G/DL (ref 13.2–17.3)
TOTAL HEMOGLOBIN: 6.8 G/DL (ref 13.2–17.3)
TOTAL HEMOGLOBIN: 7.2 G/DL (ref 13.2–17.3)
TOTAL HEMOGLOBIN: 7.8 G/DL (ref 13.2–17.3)
TOTAL HEMOGLOBIN: 8.2 G/DL (ref 13.2–17.3)
TOTAL HEMOGLOBIN: 8.2 G/DL (ref 13.2–17.3)
TOTAL HEMOGLOBIN: 8.3 G/DL (ref 13.2–17.3)
TOTAL IRON BINDING CAPACITY: 110 UG/DL (ref 298–536)
TOTAL PROTEIN,SERUM: 5.7 G/DL (ref 6.1–8.3)
TOTAL PROTEIN,SERUM: 6.2 G/DL (ref 6.1–8.3)
TOTAL PROTEIN,SERUM: 7.5 G/DL (ref 6.1–8.3)
TOXIC GRANULATION: PRESENT
TRANSFERRIN: 74 MG/DL (ref 200–360)
TREATCELL: 1
TREATCELL: 2
TRIGL SERPL-MCNC: 113 MG/DL (ref ?–150)
TRIGL SERPL-MCNC: 136 MG/DL (ref ?–150)
TROPONIN: 1.36 NG/ML (ref ?–0.05)
UREA UR RANDOM: 294 MG/DL
URIC ACID: 6 MG/DL (ref 2.4–8.7)
URIC ACID: 7.1 MG/DL (ref 2.4–8.7)
URIC ACID: 7.3 MG/DL (ref 2.4–8.7)
UROBILINOGEN UR STRIP.AUTO-MCNC: <2 MG/DL
VACUOLATED NEUTS: PRESENT
VACUOLATED NEUTS: PRESENT
VENT RATE: 24 /MIN
VENT RATE: 5 /MIN
VENT RATE: 5 /MIN
VENTRICULAR RATE: 101 BPM
WBC # BLD AUTO: 2.5 X10(3) UL (ref 4–13)
WBC # BLD AUTO: 2.9 X10(3) UL (ref 4–13)
WBC # BLD AUTO: 3 X10(3) UL (ref 4–13)
WBC # BLD AUTO: 3.5 X10(3) UL (ref 4–13)
WBC # BLD AUTO: 3.5 X10(3) UL (ref 4–13)
WBC # BLD AUTO: 4.2 X10(3) UL (ref 4–13)
WBC # BLD AUTO: 4.5 X10(3) UL (ref 4–13)
WBC # BLD AUTO: 4.6 X10(3) UL (ref 4–13)
WBC # BLD AUTO: 5 X10(3) UL (ref 4–13)
WBC # BLD AUTO: 5.1 X10(3) UL (ref 4–13)
WBC # BLD AUTO: 5.2 X10(3) UL (ref 4–13)
WBC # BLD AUTO: 5.2 X10(3) UL (ref 4–13)
WBC # BLD AUTO: 5.4 X10(3) UL (ref 4–13)
WBC # BLD AUTO: 5.4 X10(3) UL (ref 4–13)
WBC # BLD AUTO: 5.6 X10(3) UL (ref 4–13)
WBC # BLD AUTO: 5.7 X10(3) UL (ref 4–13)
WBC # BLD AUTO: 6.2 X10(3) UL (ref 4–13)
WBC # BLD AUTO: 6.7 X10(3) UL (ref 4–13)
WBC # BLD AUTO: 6.9 X10(3) UL (ref 4–13)
WBC # BLD AUTO: 7.6 X10(3) UL (ref 4–13)
WBC # BLD AUTO: 7.7 X10(3) UL (ref 4–13)
WBC # BLD AUTO: 7.9 X10(3) UL (ref 4–13)
WBC # BLD AUTO: 8.1 X10(3) UL (ref 4–13)
WBC # BLD AUTO: 8.3 X10(3) UL (ref 4–13)
WBC # BLD AUTO: 8.9 X10(3) UL (ref 4–13)

## 2018-01-01 PROCEDURE — 99232 SBSQ HOSP IP/OBS MODERATE 35: CPT | Performed by: HOSPITALIST

## 2018-01-01 PROCEDURE — 99215 OFFICE O/P EST HI 40 MIN: CPT | Performed by: SPECIALIST

## 2018-01-01 PROCEDURE — 96361 HYDRATE IV INFUSION ADD-ON: CPT

## 2018-01-01 PROCEDURE — 97597 DBRDMT OPN WND 1ST 20 CM/<: CPT

## 2018-01-01 PROCEDURE — 99223 1ST HOSP IP/OBS HIGH 75: CPT | Performed by: HOSPITALIST

## 2018-01-01 PROCEDURE — 99215 OFFICE O/P EST HI 40 MIN: CPT | Performed by: NURSE PRACTITIONER

## 2018-01-01 PROCEDURE — 71046 X-RAY EXAM CHEST 2 VIEWS: CPT | Performed by: HOSPITALIST

## 2018-01-01 PROCEDURE — 99232 SBSQ HOSP IP/OBS MODERATE 35: CPT | Performed by: NURSE PRACTITIONER

## 2018-01-01 PROCEDURE — 02HV33Z INSERTION OF INFUSION DEVICE INTO SUPERIOR VENA CAVA, PERCUTANEOUS APPROACH: ICD-10-PCS | Performed by: RADIOLOGY

## 2018-01-01 PROCEDURE — 71045 X-RAY EXAM CHEST 1 VIEW: CPT | Performed by: NURSE PRACTITIONER

## 2018-01-01 PROCEDURE — 99232 SBSQ HOSP IP/OBS MODERATE 35: CPT | Performed by: INTERNAL MEDICINE

## 2018-01-01 PROCEDURE — 99233 SBSQ HOSP IP/OBS HIGH 50: CPT | Performed by: INTERNAL MEDICINE

## 2018-01-01 PROCEDURE — 30233N1 TRANSFUSION OF NONAUTOLOGOUS RED BLOOD CELLS INTO PERIPHERAL VEIN, PERCUTANEOUS APPROACH: ICD-10-PCS | Performed by: HOSPITALIST

## 2018-01-01 PROCEDURE — G9678 ONCOLOGY CARE MODEL SERVICE: HCPCS | Performed by: SPECIALIST

## 2018-01-01 PROCEDURE — 99233 SBSQ HOSP IP/OBS HIGH 50: CPT | Performed by: HOSPITALIST

## 2018-01-01 PROCEDURE — 99497 ADVNCD CARE PLAN 30 MIN: CPT | Performed by: HOSPITALIST

## 2018-01-01 PROCEDURE — 86922 COMPATIBILITY TEST ANTIGLOB: CPT

## 2018-01-01 PROCEDURE — 96365 THER/PROPH/DIAG IV INF INIT: CPT

## 2018-01-01 PROCEDURE — 99233 SBSQ HOSP IP/OBS HIGH 50: CPT | Performed by: SPECIALIST

## 2018-01-01 PROCEDURE — 99221 1ST HOSP IP/OBS SF/LOW 40: CPT | Performed by: NURSE PRACTITIONER

## 2018-01-01 PROCEDURE — 97598 DBRDMT OPN WND ADDL 20CM/<: CPT

## 2018-01-01 PROCEDURE — 76700 US EXAM ABDOM COMPLETE: CPT | Performed by: INTERNAL MEDICINE

## 2018-01-01 PROCEDURE — 99223 1ST HOSP IP/OBS HIGH 75: CPT | Performed by: SPECIALIST

## 2018-01-01 PROCEDURE — 83605 ASSAY OF LACTIC ACID: CPT

## 2018-01-01 PROCEDURE — 86077 PHYS BLOOD BANK SERV XMATCH: CPT

## 2018-01-01 PROCEDURE — 81001 URINALYSIS AUTO W/SCOPE: CPT

## 2018-01-01 PROCEDURE — 71045 X-RAY EXAM CHEST 1 VIEW: CPT | Performed by: INTERNAL MEDICINE

## 2018-01-01 PROCEDURE — 99239 HOSP IP/OBS DSCHRG MGMT >30: CPT | Performed by: HOSPITALIST

## 2018-01-01 PROCEDURE — 99223 1ST HOSP IP/OBS HIGH 75: CPT | Performed by: INTERNAL MEDICINE

## 2018-01-01 PROCEDURE — 85027 COMPLETE CBC AUTOMATED: CPT

## 2018-01-01 PROCEDURE — 30233R1 TRANSFUSION OF NONAUTOLOGOUS PLATELETS INTO PERIPHERAL VEIN, PERCUTANEOUS APPROACH: ICD-10-PCS | Performed by: HOSPITALIST

## 2018-01-01 PROCEDURE — 99496 TRANSJ CARE MGMT HIGH F2F 7D: CPT | Performed by: FAMILY MEDICINE

## 2018-01-01 PROCEDURE — 96360 HYDRATION IV INFUSION INIT: CPT

## 2018-01-01 PROCEDURE — 99233 SBSQ HOSP IP/OBS HIGH 50: CPT | Performed by: NURSE PRACTITIONER

## 2018-01-01 PROCEDURE — 71045 X-RAY EXAM CHEST 1 VIEW: CPT | Performed by: EMERGENCY MEDICINE

## 2018-01-01 PROCEDURE — 85007 BL SMEAR W/DIFF WBC COUNT: CPT

## 2018-01-01 PROCEDURE — 0DH63UZ INSERTION OF FEEDING DEVICE INTO STOMACH, PERCUTANEOUS APPROACH: ICD-10-PCS | Performed by: INTERNAL MEDICINE

## 2018-01-01 PROCEDURE — 99222 1ST HOSP IP/OBS MODERATE 55: CPT | Performed by: INTERNAL MEDICINE

## 2018-01-01 PROCEDURE — 99498 ADVNCD CARE PLAN ADDL 30 MIN: CPT | Performed by: NURSE PRACTITIONER

## 2018-01-01 PROCEDURE — 82533 TOTAL CORTISOL: CPT

## 2018-01-01 PROCEDURE — 99213 OFFICE O/P EST LOW 20 MIN: CPT | Performed by: INTERNAL MEDICINE

## 2018-01-01 PROCEDURE — 30233N1 TRANSFUSION OF NONAUTOLOGOUS RED BLOOD CELLS INTO PERIPHERAL VEIN, PERCUTANEOUS APPROACH: ICD-10-PCS | Performed by: EMERGENCY MEDICINE

## 2018-01-01 PROCEDURE — 87040 BLOOD CULTURE FOR BACTERIA: CPT

## 2018-01-01 PROCEDURE — 30233H1 TRANSFUSION OF NONAUTOLOGOUS WHOLE BLOOD INTO PERIPHERAL VEIN, PERCUTANEOUS APPROACH: ICD-10-PCS | Performed by: HOSPITALIST

## 2018-01-01 PROCEDURE — 99215 OFFICE O/P EST HI 40 MIN: CPT | Performed by: CLINICAL NURSE SPECIALIST

## 2018-01-01 PROCEDURE — 86850 RBC ANTIBODY SCREEN: CPT

## 2018-01-01 PROCEDURE — 5A09457 ASSISTANCE WITH RESPIRATORY VENTILATION, 24-96 CONSECUTIVE HOURS, CONTINUOUS POSITIVE AIRWAY PRESSURE: ICD-10-PCS | Performed by: HOSPITALIST

## 2018-01-01 PROCEDURE — 74176 CT ABD & PELVIS W/O CONTRAST: CPT | Performed by: INTERNAL MEDICINE

## 2018-01-01 PROCEDURE — 85025 COMPLETE CBC W/AUTO DIFF WBC: CPT

## 2018-01-01 PROCEDURE — B548ZZA ULTRASONOGRAPHY OF SUPERIOR VENA CAVA, GUIDANCE: ICD-10-PCS | Performed by: RADIOLOGY

## 2018-01-01 PROCEDURE — 99214 OFFICE O/P EST MOD 30 MIN: CPT | Performed by: NURSE PRACTITIONER

## 2018-01-01 PROCEDURE — 99214 OFFICE O/P EST MOD 30 MIN: CPT | Performed by: INTERNAL MEDICINE

## 2018-01-01 PROCEDURE — 99232 SBSQ HOSP IP/OBS MODERATE 35: CPT | Performed by: SPECIALIST

## 2018-01-01 PROCEDURE — 86870 RBC ANTIBODY IDENTIFICATION: CPT

## 2018-01-01 PROCEDURE — 99231 SBSQ HOSP IP/OBS SF/LOW 25: CPT | Performed by: SPECIALIST

## 2018-01-01 PROCEDURE — 86900 BLOOD TYPING SEROLOGIC ABO: CPT

## 2018-01-01 PROCEDURE — 71045 X-RAY EXAM CHEST 1 VIEW: CPT | Performed by: HOSPITALIST

## 2018-01-01 PROCEDURE — 87086 URINE CULTURE/COLONY COUNT: CPT

## 2018-01-01 PROCEDURE — 99214 OFFICE O/P EST MOD 30 MIN: CPT

## 2018-01-01 PROCEDURE — 36430 TRANSFUSION BLD/BLD COMPNT: CPT

## 2018-01-01 PROCEDURE — 86901 BLOOD TYPING SEROLOGIC RH(D): CPT

## 2018-01-01 PROCEDURE — 99497 ADVNCD CARE PLAN 30 MIN: CPT | Performed by: NURSE PRACTITIONER

## 2018-01-01 PROCEDURE — 99495 TRANSJ CARE MGMT MOD F2F 14D: CPT | Performed by: FAMILY MEDICINE

## 2018-01-01 PROCEDURE — 85018 HEMOGLOBIN: CPT

## 2018-01-01 PROCEDURE — 80053 COMPREHEN METABOLIC PANEL: CPT

## 2018-01-01 PROCEDURE — 96374 THER/PROPH/DIAG INJ IV PUSH: CPT

## 2018-01-01 PROCEDURE — 3E0G76Z INTRODUCTION OF NUTRITIONAL SUBSTANCE INTO UPPER GI, VIA NATURAL OR ARTIFICIAL OPENING: ICD-10-PCS | Performed by: HOSPITALIST

## 2018-01-01 DEVICE — SAFETY PEG KIT 20FR: Type: IMPLANTABLE DEVICE | Site: ABDOMEN | Status: FUNCTIONAL

## 2018-01-01 RX ORDER — MEGESTROL ACETATE 40 MG/ML
200 SUSPENSION ORAL DAILY
COMMUNITY
End: 2018-01-01 | Stop reason: ALTCHOICE

## 2018-01-01 RX ORDER — ACETAMINOPHEN 325 MG/1
650 TABLET ORAL ONCE
Status: CANCELLED | OUTPATIENT
Start: 2018-01-01

## 2018-01-01 RX ORDER — DEXTROSE MONOHYDRATE 25 G/50ML
50 INJECTION, SOLUTION INTRAVENOUS AS NEEDED
Status: DISCONTINUED | OUTPATIENT
Start: 2018-01-01 | End: 2018-01-01

## 2018-01-01 RX ORDER — DIPHENHYDRAMINE HCL 25 MG
25 CAPSULE ORAL ONCE
Status: CANCELLED | OUTPATIENT
Start: 2018-01-01

## 2018-01-01 RX ORDER — ACETAMINOPHEN 325 MG/1
650 TABLET ORAL EVERY 6 HOURS PRN
Status: DISCONTINUED | OUTPATIENT
Start: 2018-01-01 | End: 2018-01-01

## 2018-01-01 RX ORDER — ACETAMINOPHEN 325 MG/1
650 TABLET ORAL ONCE
Status: DISCONTINUED | OUTPATIENT
Start: 2018-01-01 | End: 2018-01-01

## 2018-01-01 RX ORDER — SODIUM CHLORIDE, SODIUM LACTATE, POTASSIUM CHLORIDE, CALCIUM CHLORIDE 600; 310; 30; 20 MG/100ML; MG/100ML; MG/100ML; MG/100ML
INJECTION, SOLUTION INTRAVENOUS CONTINUOUS
Status: DISCONTINUED | OUTPATIENT
Start: 2018-01-01 | End: 2018-01-01

## 2018-01-01 RX ORDER — HYDROCORTISONE 20 MG/1
20 TABLET ORAL 2 TIMES DAILY
Status: DISCONTINUED | OUTPATIENT
Start: 2018-01-01 | End: 2018-01-01

## 2018-01-01 RX ORDER — CALCITONIN SALMON 200 [USP'U]/ML
200 INJECTION, SOLUTION INTRAMUSCULAR; SUBCUTANEOUS 2 TIMES DAILY
Status: DISCONTINUED | OUTPATIENT
Start: 2018-01-01 | End: 2018-01-01

## 2018-01-01 RX ORDER — ALBUMIN (HUMAN) 12.5 G/50ML
25 SOLUTION INTRAVENOUS ONCE
Status: COMPLETED | OUTPATIENT
Start: 2018-01-01 | End: 2018-01-01

## 2018-01-01 RX ORDER — CALCIUM CARBONATE/VITAMIN D3 600 MG-10
1 TABLET ORAL 2 TIMES DAILY
COMMUNITY
End: 2018-01-01

## 2018-01-01 RX ORDER — DEXMEDETOMIDINE HYDROCHLORIDE 4 UG/ML
INJECTION, SOLUTION INTRAVENOUS CONTINUOUS
Status: DISCONTINUED | OUTPATIENT
Start: 2018-01-01 | End: 2018-01-01

## 2018-01-01 RX ORDER — POTASSIUM CHLORIDE 29.8 MG/ML
40 INJECTION INTRAVENOUS ONCE
Status: COMPLETED | OUTPATIENT
Start: 2018-01-01 | End: 2018-01-01

## 2018-01-01 RX ORDER — SODIUM CHLORIDE 9 MG/ML
INJECTION, SOLUTION INTRAVENOUS ONCE
Status: COMPLETED | OUTPATIENT
Start: 2018-01-01 | End: 2018-01-01

## 2018-01-01 RX ORDER — METOCLOPRAMIDE HYDROCHLORIDE 5 MG/ML
10 INJECTION INTRAMUSCULAR; INTRAVENOUS EVERY 8 HOURS
Status: DISCONTINUED | OUTPATIENT
Start: 2018-01-01 | End: 2018-01-01

## 2018-01-01 RX ORDER — METOCLOPRAMIDE HYDROCHLORIDE 5 MG/ML
10 INJECTION INTRAMUSCULAR; INTRAVENOUS EVERY 8 HOURS PRN
Status: DISCONTINUED | OUTPATIENT
Start: 2018-01-01 | End: 2018-01-01

## 2018-01-01 RX ORDER — FUROSEMIDE 10 MG/ML
60 INJECTION INTRAMUSCULAR; INTRAVENOUS EVERY 12 HOURS
Status: DISCONTINUED | OUTPATIENT
Start: 2018-01-01 | End: 2018-01-01

## 2018-01-01 RX ORDER — SODIUM CHLORIDE 9 MG/ML
INJECTION, SOLUTION INTRAVENOUS CONTINUOUS
Status: DISCONTINUED | OUTPATIENT
Start: 2018-01-01 | End: 2018-01-01

## 2018-01-01 RX ORDER — PANTOPRAZOLE SODIUM 40 MG/1
40 TABLET, DELAYED RELEASE ORAL
COMMUNITY
End: 2018-01-01

## 2018-01-01 RX ORDER — SODIUM CHLORIDE 9 MG/ML
INJECTION, SOLUTION INTRAVENOUS ONCE
Status: DISCONTINUED | OUTPATIENT
Start: 2018-01-01 | End: 2018-01-01

## 2018-01-01 RX ORDER — VENLAFAXINE 37.5 MG/1
37.5 TABLET ORAL 3 TIMES DAILY
Qty: 90 TABLET | Refills: 0 | Status: SHIPPED | OUTPATIENT
Start: 2018-01-01 | End: 2018-01-01

## 2018-01-01 RX ORDER — ACETAMINOPHEN 325 MG/1
650 TABLET ORAL ONCE
Status: COMPLETED | OUTPATIENT
Start: 2018-01-01 | End: 2018-01-01

## 2018-01-01 RX ORDER — DEXTROSE AND SODIUM CHLORIDE 5; .9 G/100ML; G/100ML
INJECTION, SOLUTION INTRAVENOUS CONTINUOUS
Status: DISCONTINUED | OUTPATIENT
Start: 2018-01-01 | End: 2018-01-01

## 2018-01-01 RX ORDER — ONDANSETRON 2 MG/ML
4 INJECTION INTRAMUSCULAR; INTRAVENOUS EVERY 6 HOURS PRN
Status: DISCONTINUED | OUTPATIENT
Start: 2018-01-01 | End: 2018-01-01

## 2018-01-01 RX ORDER — IPRATROPIUM BROMIDE AND ALBUTEROL SULFATE 2.5; .5 MG/3ML; MG/3ML
3 SOLUTION RESPIRATORY (INHALATION)
Status: DISCONTINUED | OUTPATIENT
Start: 2018-01-01 | End: 2018-01-01

## 2018-01-01 RX ORDER — SODIUM BICARBONATE 325 MG/1
650 TABLET ORAL 2 TIMES DAILY
Status: DISCONTINUED | OUTPATIENT
Start: 2018-01-01 | End: 2018-01-01

## 2018-01-01 RX ORDER — ACYCLOVIR 400 MG/1
400 TABLET ORAL 2 TIMES DAILY
Status: DISCONTINUED | OUTPATIENT
Start: 2018-01-01 | End: 2018-01-01

## 2018-01-01 RX ORDER — DIPHENHYDRAMINE HCL 25 MG
25 CAPSULE ORAL ONCE
Status: COMPLETED | OUTPATIENT
Start: 2018-01-01 | End: 2018-01-01

## 2018-01-01 RX ORDER — ARIPIPRAZOLE 15 MG/1
40 TABLET ORAL EVERY 4 HOURS
Status: COMPLETED | OUTPATIENT
Start: 2018-01-01 | End: 2018-01-01

## 2018-01-01 RX ORDER — DEXTROSE MONOHYDRATE 100 MG/ML
INJECTION, SOLUTION INTRAVENOUS CONTINUOUS
Status: DISCONTINUED | OUTPATIENT
Start: 2018-01-01 | End: 2018-01-01

## 2018-01-01 RX ORDER — GABAPENTIN 100 MG/1
1 CAPSULE ORAL 2 TIMES DAILY
COMMUNITY
Start: 2014-05-30 | End: 2018-01-01 | Stop reason: ALTCHOICE

## 2018-01-01 RX ORDER — MIDODRINE HYDROCHLORIDE 5 MG/1
5 TABLET ORAL 3 TIMES DAILY
Status: DISCONTINUED | OUTPATIENT
Start: 2018-01-01 | End: 2018-01-01

## 2018-01-01 RX ORDER — FUROSEMIDE 10 MG/ML
40 INJECTION INTRAMUSCULAR; INTRAVENOUS ONCE
Status: COMPLETED | OUTPATIENT
Start: 2018-01-01 | End: 2018-01-01

## 2018-01-01 RX ORDER — FUROSEMIDE 10 MG/ML
40 INJECTION INTRAMUSCULAR; INTRAVENOUS
Status: COMPLETED | OUTPATIENT
Start: 2018-01-01 | End: 2018-01-01

## 2018-01-01 RX ORDER — LORAZEPAM 2 MG/ML
1 INJECTION INTRAMUSCULAR EVERY 4 HOURS PRN
Status: DISCONTINUED | OUTPATIENT
Start: 2018-01-01 | End: 2018-01-01

## 2018-01-01 RX ORDER — HYDROCORTISONE 5 MG/1
TABLET ORAL
Qty: 180 TABLET | Refills: 0 | Status: SHIPPED | OUTPATIENT
Start: 2018-01-01 | End: 2018-01-01

## 2018-01-01 RX ORDER — ONDANSETRON 2 MG/ML
4 INJECTION INTRAMUSCULAR; INTRAVENOUS EVERY 4 HOURS PRN
Status: DISCONTINUED | OUTPATIENT
Start: 2018-01-01 | End: 2018-01-01

## 2018-01-01 RX ORDER — BISACODYL 10 MG
10 SUPPOSITORY, RECTAL RECTAL
Status: DISCONTINUED | OUTPATIENT
Start: 2018-01-01 | End: 2018-01-01

## 2018-01-01 RX ORDER — HYDROCORTISONE 10 MG/1
10 TABLET ORAL EVERY EVENING
Status: DISCONTINUED | OUTPATIENT
Start: 2018-01-01 | End: 2018-01-01

## 2018-01-01 RX ORDER — ONDANSETRON 2 MG/ML
4 INJECTION INTRAMUSCULAR; INTRAVENOUS AS NEEDED
Status: DISCONTINUED | OUTPATIENT
Start: 2018-01-01 | End: 2018-01-01

## 2018-01-01 RX ORDER — VENLAFAXINE 37.5 MG/1
37.5 TABLET ORAL 3 TIMES DAILY
Qty: 90 TABLET | Refills: 3 | Status: SHIPPED | OUTPATIENT
Start: 2018-01-01 | End: 2018-01-01

## 2018-01-01 RX ORDER — DEXTROSE AND SODIUM CHLORIDE 5; .45 G/100ML; G/100ML
INJECTION, SOLUTION INTRAVENOUS CONTINUOUS
Status: DISCONTINUED | OUTPATIENT
Start: 2018-01-01 | End: 2018-01-01

## 2018-01-01 RX ORDER — MIDODRINE HYDROCHLORIDE 5 MG/1
5 TABLET ORAL 3 TIMES DAILY
Qty: 90 TABLET | Refills: 0 | Status: SHIPPED | OUTPATIENT
Start: 2018-01-01 | End: 2018-04-13

## 2018-01-01 RX ORDER — MIDODRINE HYDROCHLORIDE 5 MG/1
5 TABLET ORAL 3 TIMES DAILY
COMMUNITY
End: 2018-01-01

## 2018-01-01 RX ORDER — POTASSIUM CHLORIDE 20 MEQ/1
20 TABLET, EXTENDED RELEASE ORAL ONCE
Status: COMPLETED | OUTPATIENT
Start: 2018-01-01 | End: 2018-01-01

## 2018-01-01 RX ORDER — ONDANSETRON 2 MG/ML
4 INJECTION INTRAMUSCULAR; INTRAVENOUS EVERY 4 HOURS PRN
Status: CANCELLED | OUTPATIENT
Start: 2018-01-01

## 2018-01-01 RX ORDER — POTASSIUM CHLORIDE 20 MEQ/1
20 TABLET, EXTENDED RELEASE ORAL DAILY
COMMUNITY
End: 2018-01-01

## 2018-01-01 RX ORDER — HYDROCORTISONE 10 MG/1
25 TABLET ORAL 2 TIMES DAILY
Status: DISCONTINUED | OUTPATIENT
Start: 2018-01-01 | End: 2018-01-01

## 2018-01-01 RX ORDER — CALCITONIN SALMON 200 [USP'U]/ML
200 INJECTION, SOLUTION INTRAMUSCULAR; SUBCUTANEOUS ONCE
Status: COMPLETED | OUTPATIENT
Start: 2018-01-01 | End: 2018-01-01

## 2018-01-01 RX ORDER — MIDODRINE HYDROCHLORIDE 5 MG/1
5 TABLET ORAL 3 TIMES DAILY
Qty: 90 TABLET | Refills: 0 | Status: SHIPPED | OUTPATIENT
Start: 2018-01-01 | End: 2018-01-01

## 2018-01-01 RX ORDER — VENLAFAXINE 37.5 MG/1
TABLET ORAL
Qty: 90 TABLET | Refills: 0 | Status: SHIPPED | OUTPATIENT
Start: 2018-01-01

## 2018-01-01 RX ORDER — DEXTROSE MONOHYDRATE 25 G/50ML
INJECTION, SOLUTION INTRAVENOUS
Status: COMPLETED
Start: 2018-01-01 | End: 2018-01-01

## 2018-01-01 RX ORDER — LIDOCAINE HYDROCHLORIDE 10 MG/ML
INJECTION, SOLUTION EPIDURAL; INFILTRATION; INTRACAUDAL; PERINEURAL
Status: COMPLETED
Start: 2018-01-01 | End: 2018-01-01

## 2018-01-01 RX ORDER — MAGNESIUM OXIDE 400 MG (241.3 MG MAGNESIUM) TABLET
400 TABLET ONCE
Status: DISCONTINUED | OUTPATIENT
Start: 2018-01-01 | End: 2018-01-01

## 2018-01-01 RX ORDER — ACYCLOVIR 400 MG/1
400 TABLET ORAL 2 TIMES DAILY
Status: ON HOLD | COMMUNITY
End: 2018-01-01

## 2018-01-01 RX ORDER — MAGNESIUM SULFATE HEPTAHYDRATE 40 MG/ML
2 INJECTION, SOLUTION INTRAVENOUS ONCE
Status: COMPLETED | OUTPATIENT
Start: 2018-01-01 | End: 2018-01-01

## 2018-01-01 RX ORDER — HYDROCORTISONE 20 MG/1
TABLET ORAL
Qty: 180 TABLET | Refills: 0 | Status: SHIPPED | OUTPATIENT
Start: 2018-01-01 | End: 2018-01-01

## 2018-01-01 RX ORDER — ACYCLOVIR 400 MG/1
TABLET ORAL
Qty: 60 TABLET | Refills: 0 | Status: SHIPPED | OUTPATIENT
Start: 2018-01-01 | End: 2018-01-01

## 2018-01-01 RX ORDER — VENLAFAXINE 37.5 MG/1
37.5 TABLET ORAL 3 TIMES DAILY
Status: DISCONTINUED | OUTPATIENT
Start: 2018-01-01 | End: 2018-01-01

## 2018-01-01 RX ORDER — VENLAFAXINE 37.5 MG/1
37.5 TABLET ORAL 3 TIMES DAILY
COMMUNITY
End: 2018-01-01

## 2018-01-01 RX ORDER — FUROSEMIDE 10 MG/ML
20 INJECTION INTRAMUSCULAR; INTRAVENOUS ONCE
Status: DISCONTINUED | OUTPATIENT
Start: 2018-01-01 | End: 2018-01-01

## 2018-01-01 RX ORDER — ARIPIPRAZOLE 15 MG/1
40 TABLET ORAL ONCE
Status: COMPLETED | OUTPATIENT
Start: 2018-01-01 | End: 2018-01-01

## 2018-01-01 RX ORDER — GABAPENTIN 300 MG/1
300 CAPSULE ORAL 3 TIMES DAILY
COMMUNITY
End: 2018-01-01

## 2018-01-01 RX ORDER — HYDROCORTISONE 5 MG/1
5 TABLET ORAL 2 TIMES DAILY
COMMUNITY

## 2018-01-01 RX ORDER — VENLAFAXINE 37.5 MG/1
37.5 TABLET ORAL
Status: DISCONTINUED | OUTPATIENT
Start: 2018-01-01 | End: 2018-01-01

## 2018-01-01 RX ORDER — PANTOPRAZOLE SODIUM 40 MG/1
40 TABLET, DELAYED RELEASE ORAL
COMMUNITY

## 2018-01-01 RX ORDER — AMOXICILLIN 250 MG/5ML
250 POWDER, FOR SUSPENSION ORAL 3 TIMES DAILY
COMMUNITY
End: 2018-01-01 | Stop reason: ALTCHOICE

## 2018-01-01 RX ORDER — ENTECAVIR 0.5 MG/1
0.5 TABLET, FILM COATED ORAL
Status: DISCONTINUED | OUTPATIENT
Start: 2018-01-01 | End: 2018-01-01

## 2018-01-01 RX ORDER — HYDROCORTISONE 20 MG/1
20 TABLET ORAL 2 TIMES DAILY
COMMUNITY

## 2018-01-01 RX ORDER — HEPARIN SODIUM 5000 [USP'U]/ML
5000 INJECTION, SOLUTION INTRAVENOUS; SUBCUTANEOUS EVERY 12 HOURS SCHEDULED
Status: CANCELLED | OUTPATIENT
Start: 2018-01-01

## 2018-01-01 RX ORDER — ENTECAVIR 0.5 MG/1
0.5 TABLET, FILM COATED ORAL
Qty: 6 TABLET | Refills: 0 | COMMUNITY
Start: 2018-01-01 | End: 2018-01-01

## 2018-01-01 RX ORDER — PANTOPRAZOLE SODIUM 40 MG/1
40 TABLET, DELAYED RELEASE ORAL
Status: DISCONTINUED | OUTPATIENT
Start: 2018-01-01 | End: 2018-01-01

## 2018-01-01 RX ORDER — SODIUM CHLORIDE 9 MG/ML
1000 INJECTION, SOLUTION INTRAVENOUS ONCE
Status: COMPLETED | OUTPATIENT
Start: 2018-01-01 | End: 2018-01-01

## 2018-01-01 RX ORDER — ENTECAVIR 0.5 MG/1
0.5 TABLET, FILM COATED ORAL
Qty: 30 TABLET | Refills: 0 | Status: SHIPPED | OUTPATIENT
Start: 2018-01-01 | End: 2018-01-01

## 2018-01-01 RX ORDER — MEGESTROL ACETATE 40 MG/ML
200 SUSPENSION ORAL DAILY
Status: DISCONTINUED | OUTPATIENT
Start: 2018-01-01 | End: 2018-01-01

## 2018-01-01 RX ORDER — MELATONIN
1000 DAILY
COMMUNITY
End: 2018-01-01

## 2018-01-01 RX ORDER — GABAPENTIN 300 MG/1
300 CAPSULE ORAL 3 TIMES DAILY
Qty: 270 CAPSULE | Refills: 1 | Status: SHIPPED | OUTPATIENT
Start: 2018-01-01

## 2018-01-01 RX ORDER — HYDROCORTISONE 10 MG/1
20 TABLET ORAL EVERY MORNING
Status: DISCONTINUED | OUTPATIENT
Start: 2018-01-01 | End: 2018-01-01

## 2018-01-01 RX ORDER — POTASSIUM CHLORIDE 20 MEQ/1
40 TABLET, EXTENDED RELEASE ORAL ONCE
Status: COMPLETED | OUTPATIENT
Start: 2018-01-01 | End: 2018-01-01

## 2018-01-01 RX ORDER — POTASSIUM CHLORIDE 20 MEQ/1
20 TABLET, EXTENDED RELEASE ORAL DAILY
Qty: 30 TABLET | Refills: 6 | Status: SHIPPED | OUTPATIENT
Start: 2018-01-01 | End: 2018-01-01

## 2018-01-01 RX ORDER — ENOXAPARIN SODIUM 100 MG/ML
40 INJECTION SUBCUTANEOUS DAILY
Status: DISCONTINUED | OUTPATIENT
Start: 2018-01-01 | End: 2018-01-01

## 2018-01-01 RX ORDER — ACYCLOVIR 400 MG/1
200 TABLET ORAL 2 TIMES DAILY
Qty: 30 TABLET | Refills: 2 | Status: SHIPPED | OUTPATIENT
Start: 2018-01-01 | End: 2018-01-01

## 2018-01-01 RX ORDER — SODIUM BICARBONATE 650 MG/1
650 TABLET ORAL 3 TIMES DAILY
COMMUNITY

## 2018-01-01 RX ORDER — HEPARIN SODIUM 5000 [USP'U]/ML
5000 INJECTION, SOLUTION INTRAVENOUS; SUBCUTANEOUS EVERY 8 HOURS SCHEDULED
Status: DISCONTINUED | OUTPATIENT
Start: 2018-01-01 | End: 2018-01-01

## 2018-01-01 RX ORDER — HYDROCORTISONE 5 MG/1
5 TABLET ORAL 2 TIMES DAILY
Status: DISCONTINUED | OUTPATIENT
Start: 2018-01-01 | End: 2018-01-01

## 2018-01-01 RX ORDER — DEXTROSE MONOHYDRATE 25 G/50ML
50 INJECTION, SOLUTION INTRAVENOUS
Status: DISCONTINUED | OUTPATIENT
Start: 2018-01-01 | End: 2018-01-01

## 2018-01-01 RX ORDER — NALOXONE HYDROCHLORIDE 0.4 MG/ML
80 INJECTION, SOLUTION INTRAMUSCULAR; INTRAVENOUS; SUBCUTANEOUS AS NEEDED
Status: DISCONTINUED | OUTPATIENT
Start: 2018-01-01 | End: 2018-01-01

## 2018-01-01 RX ORDER — SODIUM CHLORIDE 9 MG/ML
INJECTION, SOLUTION INTRAVENOUS CONTINUOUS
Status: CANCELLED | OUTPATIENT
Start: 2018-01-01 | End: 2018-01-01

## 2018-01-01 RX ORDER — MIDODRINE HYDROCHLORIDE 10 MG/1
10 TABLET ORAL 3 TIMES DAILY
Status: DISCONTINUED | OUTPATIENT
Start: 2018-01-01 | End: 2018-01-01

## 2018-01-01 RX ORDER — ALLOPURINOL 100 MG/1
100 TABLET ORAL DAILY
COMMUNITY
End: 2018-01-01

## 2018-01-01 RX ORDER — MEGESTROL ACETATE 40 MG/ML
800 SUSPENSION ORAL DAILY
Qty: 600 ML | Refills: 0 | Status: SHIPPED | OUTPATIENT
Start: 2018-01-01 | End: 2018-01-01

## 2018-01-01 RX ORDER — ENTECAVIR 0.5 MG/1
0.5 TABLET, FILM COATED ORAL
Status: ON HOLD | COMMUNITY
End: 2018-01-01

## 2018-01-01 RX ORDER — MEGESTROL ACETATE 40 MG/ML
800 SUSPENSION ORAL DAILY
Status: DISCONTINUED | OUTPATIENT
Start: 2018-01-01 | End: 2018-01-01

## 2018-01-01 RX ORDER — CALCIUM GLUCONATE 94 MG/ML
1 INJECTION, SOLUTION INTRAVENOUS ONCE
Status: DISCONTINUED | OUTPATIENT
Start: 2018-01-01 | End: 2018-01-01 | Stop reason: CLARIF

## 2018-01-01 RX ORDER — IPRATROPIUM BROMIDE AND ALBUTEROL SULFATE 2.5; .5 MG/3ML; MG/3ML
3 SOLUTION RESPIRATORY (INHALATION) EVERY 4 HOURS PRN
Status: DISCONTINUED | OUTPATIENT
Start: 2018-01-01 | End: 2018-01-01

## 2018-01-01 RX ORDER — ENTECAVIR 0.5 MG/1
0.5 TABLET, FILM COATED ORAL WEEKLY
Qty: 6 TABLET | Refills: 0 | Status: SHIPPED | OUTPATIENT
Start: 2018-01-01 | End: 2018-01-01

## 2018-01-01 RX ORDER — SODIUM BICARBONATE 325 MG/1
650 TABLET ORAL 3 TIMES DAILY
Status: DISCONTINUED | OUTPATIENT
Start: 2018-01-01 | End: 2018-01-01

## 2018-01-01 RX ORDER — DIPHENHYDRAMINE HCL 25 MG
25 CAPSULE ORAL ONCE
Status: DISCONTINUED | OUTPATIENT
Start: 2018-01-01 | End: 2018-01-01

## 2018-01-01 RX ORDER — LORAZEPAM 1 MG/1
1 TABLET ORAL EVERY 4 HOURS PRN
Status: DISCONTINUED | OUTPATIENT
Start: 2018-01-01 | End: 2018-01-01

## 2018-01-01 RX ORDER — SODIUM POLYSTYRENE SULFONATE 15 G/60ML
15 SUSPENSION ORAL; RECTAL ONCE
Status: DISCONTINUED | OUTPATIENT
Start: 2018-01-01 | End: 2018-01-01

## 2018-01-01 RX ADMIN — DIPHENHYDRAMINE HCL 25 MG: 25 MG CAPSULE ORAL at 11:59:00

## 2018-01-01 RX ADMIN — DIPHENHYDRAMINE HCL 25 MG: 25 MG CAPSULE ORAL at 13:57:00

## 2018-01-01 RX ADMIN — ACETAMINOPHEN 650 MG: 325 TABLET ORAL at 13:57:00

## 2018-01-01 RX ADMIN — ACETAMINOPHEN 325 MG: 325 TABLET ORAL at 11:59:00

## 2018-01-01 SDOH — SOCIAL STABILITY - SOCIAL INSECURITY: DEPENDENT RELATIVE NEEDING CARE AT HOME: Z63.6

## 2018-01-01 NOTE — PROGRESS NOTES
[facility]  Progress Note     Thor Matute Patient Status:  Inpatient    3/6/1955 MRN [id number]   [facility] 4SW-A Attending Raeann Gary MD   Hosp Day # 1 PCP Nain Michelle DO     Chief Complaint: Septic shock    S: Patient int mL Mouth/Throat [email or web address]   • meropenem  500 mg Intravenous Q12H   • linezolid  600 mg Intravenous Q12H   • acyclovir  400 mg Oral BID   • Heparin Sodium (Porcine)  5,000 Units Subcutaneous 2 times per day   • famoTIDine  20 mg Intravenous Daily   • ip

## 2018-01-01 NOTE — PROGRESS NOTES
Troponin resulted at 1.88. EKG without ischemic changes. TTE with EF 70-75%, NRWMAs. Repeat troponin 1.930. Discussed with Dr. Joseph Rod. Will continue to trend until peak.  No plans for cards consult at this time as elevated troponin likely demand ischemia i

## 2018-01-01 NOTE — PLAN OF CARE
CARDIOVASCULAR - ADULT    • Maintains optimal cardiac output and hemodynamic stability Not Progressing        NEUROLOGICAL - ADULT    • Achieves stable or improved neurological status Not Progressing        Patient/Family Goals    • Patient/Family Long Ter

## 2018-01-01 NOTE — PROGRESS NOTES
Heme/Onc Progress Note - [facility]      Chief Complaint:    Follow up for multiple myeloma with sepsis and shock. Interim History:      Intubated and sedated. No external bleeding. Physical Examination:    Vital Signs: /43   Pulse 106   Temp 98. from myeloma at baseline. 7. Thrombocytopenia:    Acute on chronic. Has chronic thrombocytopenia due to underlying myeloma but acute decrease likely secondary to sepsis/shock/DIC. Follow and transfuse PRN.     Felix Ospina MD  [facility]

## 2018-01-01 NOTE — PLAN OF CARE
CARDIOVASCULAR - ADULT    • Maintains optimal cardiac output and hemodynamic stability Not Progressing        METABOLIC/FLUID AND ELECTROLYTES - ADULT    • Electrolytes maintained within normal limits Not Progressing    • Hemodynamic stability and optimal

## 2018-01-01 NOTE — RESPIRATORY THERAPY NOTE
Received pt @ 1500 on vent setting PRVC 30/500/60%/+10. BS are diminished. Attempted sputum sample. Will try again. q6 tx. Will continue to monitor.

## 2018-01-01 NOTE — PHYSICAL THERAPY NOTE
Orders received and hx and chart reviewed. Pt is currently intubated and sedated. Per RN, pt is not appropriate for therapy today. Will attempt again as schedule allows. Thank you.

## 2018-01-01 NOTE — PROGRESS NOTES
Wife at bedside and updated on patient's status. All questions and concerns addressed. Code status discussed and confirmed. Per wife/POA, wishes are for patient to remain a full code as \"He is not done living his life yet. \"    Natali Dixon, ACNP-BC  ICU  P

## 2018-01-01 NOTE — PROGRESS NOTES
[facility]  Progress Note    Bolivar Vargas Patient Status:  Inpatient    3/6/1955 MRN [id number]   [facility] 4SW-A Attending Pedro Wu MD   [address] Day # 1 PCP Siva Leija,      Subjective:  Bolivar Vargas is a(n) 58year old mal 4. 98   WBC  4.4  6.2   PLT  66.0*  28.0*     Recent Labs   Lab  12/31/17   0816  12/31/17   1739  01/01/18   0332   GLU  134*  76  54*   BUN  28*  29*  29*   CREATSERUM  2.35*  2.12*  2.20*   CA  6.0*  5.6*  6.1*   NA  146*  146*  146*   K  3.0*  3.7  4.2 [facility]  · Continue IVF resuscitation and pressor agents, goal MAP >65 mmHg  · Place arterial line  · Continue full support mechanical ventilation -will adjust ventilator settings  · Wean fio2 for sats >88% and PaO2 >55  · Monitor fluid output and lyte

## 2018-01-02 NOTE — PROGRESS NOTES
[facility]  Progress Note     Danny Harpin Patient Status:  Inpatient    3/6/1955 MRN [id number]   [facility] 4SW-A Attending Codi Mcleod MD   [facility] Day # 2 PCP Jaylin Recio DO     Chief Complaint: Septic shock    S: Patient rem 01/01/18   0332  01/02/18   0706   PTP  16.0*  22.1*  18.4*   INR  1.27*  1.90*  1.51*       Recent Labs   Lab  12/31/17   0816  12/31/17   1351  12/31/17   2044   TROP  1.830*  1.930*  1.360*            Imaging: Imaging data reviewed in Epic.     [facility]

## 2018-01-02 NOTE — PLAN OF CARE
Order rec'd for blood/plt today. Consent obtained via phone: pt's wife. Pending type/screen results, +antibodies; will transfuse when available.

## 2018-01-02 NOTE — PROGRESS NOTES
01/02/18 1637   Clinical Encounter Type   Visited With Health care provider  ( spoke with nurse. Pt is intubated and unable to communicate now. Wife will be coming into [facility] on Thursday.   Nurse will page  at 2000 when wife comes.)

## 2018-01-02 NOTE — PLAN OF CARE
Received pt this am sedated, on ventilator. Soft wrist restraints. On norepinephrine and propofol. D10 gtt. 1 u prbc infused. Pending 1 u plt to be transfused, blood bank awaiting delivery since pt +antibodies, will call us/send when available.      Fragile

## 2018-01-02 NOTE — PROGRESS NOTES
Heme/Onc Progress Note - [facility]      Chief Complaint:    Follow up for multiple myeloma with sepsis and shock. Interim History:      Intubated and sedated. No external bleeding. Physical Examination:    Vital Signs: /61   Pulse 93   Temp 97. 6    3. Viral Prophylaxis: Continue acyclovir 400 mg bid.     4. Reactivation of hepatitis B:      LFTs stable. Follow.     5. Anemia:      Due to underlying myeloma. Hemoglobin is lower today.  Transfuse 1 unit PRBC's.     6. Acute on chronic renal failure

## 2018-01-02 NOTE — PROGRESS NOTES
[facility]  Critical care Progress Note    Althea Becerril Patient Status:  Inpatient    3/6/1955 MRN [id number]   [facility] [place] 4SW-A Attending Gasper Burgos MD   Hosp Day # 2 PCP Wilder Suarez, DO        ASSESSMENT     · Shock -now wit gastroesophageal reflux (GERD)     Light chain disease, lambda type (HCC)     Vitamin D deficiency     History of compression fracture of spine,lumbar,cervical- Multiple Myeloma     Reactivation of hepatitis B viral hepatitis     Fatigue due to treatment rebound, positive BS  Extremity: no lower extremity edema, no cyanosis  Neurological: , no focal deficits      Lab Data Review:    Recent Labs   01/02/18  0423 01/01/18  1301 01/01/18  0332 12/31/17  0816   WBC 8.9 8.3 6.2 4.4   HGB 6.0* 7.0* 7.8* 9.3*   P

## 2018-01-02 NOTE — PHYSICAL THERAPY NOTE
Attempted. Pt is not appropriate for inpt PT/OT today due to low Hgb of 6.0, vented, and sedated on propofol. Pt to receive blood today. Will follow up as appropriate. Carolyn Stroud, aware.

## 2018-01-02 NOTE — PROGRESS NOTES
Ca critically low on lab: 5.7 mg/dl but Albumin also low: 1.4 g/dl. Corrected Ca is 7.48.   Will replace with Ca gluconate [address] [facility] IZABEL  Critical Care Nurse Practitioner  Spec [id number]

## 2018-01-02 NOTE — DIETARY NOTE
[facility]    NUTRITION INITIAL ASSESSMENT    Pt does not meet malnutrition criteria. NUTRITION DIAGNOSIS/PROBLEM:    Inadequate energy intake related to inability to consume oral diet as evidenced by NPO and intubation and sedation.     Rogelio Law 2. Fluid Accumulation: no edema per chart per visual exam.    NUTRITION PRESCRIPTION:  Calories: 7736-5349 calories/day (27-32 calories per kg)  Protein: 81-96  grams protein/day (1.1-1.3 grams protein per kg)  Fluid: ~1 ml/kcal or per MD discretion    Carmelina Quijano

## 2018-01-02 NOTE — PLAN OF CARE
CARDIOVASCULAR - ADULT    • Maintains optimal cardiac output and hemodynamic stability Not Progressing    • Absence of cardiac arrhythmias or at baseline Not Progressing          METABOLIC/FLUID AND ELECTROLYTES - ADULT    • Glucose maintained within presc

## 2018-01-02 NOTE — OCCUPATIONAL THERAPY NOTE
Attempted. Pt is not appropriate for inpt PT/OT today due to low Hgb of 6.0, vented, and sedated on propofol. Pt to receive blood today. Will follow up as appropriate. Per PT note,  Adelfo Recinos, carey.

## 2018-01-03 NOTE — PLAN OF CARE
METABOLIC/FLUID AND ELECTROLYTES - ADULT    • Electrolytes maintained within normal limits Not Progressing        NEUROLOGICAL - ADULT    • Achieves stable or improved neurological status Not Progressing        RESPIRATORY - ADULT    • Achieves optimal hugo

## 2018-01-03 NOTE — OCCUPATIONAL THERAPY NOTE
OCCUPATIONAL THERAPY                           Pt currently remains sedated and is inappropriate for inpt PT/OT at this time. Will continue to follow as appropriate. Jahaira Cooper, carey.

## 2018-01-03 NOTE — PROGRESS NOTES
[facility]  Progress Note    Kerrie Marcano Patient Status:  Inpatient    3/6/1955 MRN [id number]   [facility] 4SW-A Attending Jayce Alvarez MD   [facility] Day # 3 PCP Nicolas Palma DO     Subjective:  Kerrie Marcano is a(n) 58year old m 1.88*  2.04*  2.44*   HGB  7.0*  6.0*  6.6*  7.5*   HCT  21.4*  18.2*  19.4*  22.2*   MCV  98.2  96.8  95.1  91.0   MCH  32.1  31.9  32.4  30.7   MCHC  32.7  33.0  34.0  33.8   RDW  23.5*  23.7*  22.7*  22.3*   NEPRELIM  7.24*  7.74*  7.09*   --    WBC  8. acutely due to likely septic shock, chronically due to recurrent multiple myeloma and recent chemotherapy  · Pancytopenia due to recurrent multiple myeloma and recent chemotherapy plus sepsis  · Recurrent mutiple myeloma on palliative chemotherapy - last r

## 2018-01-03 NOTE — PHYSICAL THERAPY NOTE
Pt currently remains sedated and is inappropriate for inpt PT/OT at this time. Will continue to follow as appropriate. Duana Alpers, carey.

## 2018-01-03 NOTE — PROGRESS NOTES
[facility]  Progress Note     Reather Edge Patient Status:  Inpatient    3/6/1955 MRN [id number]   [facility] 4SW-A Attending Naseem Baldwin MD   [facility] Day # 3 PCP Trudy Sy DO     Chief Complaint: Septic shock    S: Patient re Imaging: Imaging data reviewed in Epic.     Medications:   • sodium chloride   Intravenous Once   • furosemide  60 mg Intravenous Q12H   • Metoclopramide HCl  10 mg Intravenous Q8H   • potassium chloride  40 mEq Oral Q4H   • pantoprazole (PROTONI

## 2018-01-03 NOTE — PLAN OF CARE
CARDIOVASCULAR - ADULT    • Maintains optimal cardiac output and hemodynamic stability Not Progressing    • Absence of cardiac arrhythmias or at baseline Not Progressing          METABOLIC/FLUID AND ELECTROLYTES - ADULT    • Hemodynamic stability and optim

## 2018-01-04 NOTE — CM/SW NOTE
Responding to SW order from pna protocol. Pt was able to be extubated today. PT/OT evals done but needs yet TBD. Was able to meet and speak with wife and son briefly in ICU. Introduced myself and role of CM/SW in ICU.  Wife advises that they have ke

## 2018-01-04 NOTE — PHYSICAL THERAPY NOTE
PHYSICAL THERAPY EVALUATION - INPATIENT     Room Number: 458/458-A  Evaluation Date: 1/4/2018  Type of Evaluation: Initial  Physician Order: PT Eval and Treat    Presenting Problem: Septic Shock   Reason for Therapy: Mobility Dysfunction and Discharge • Kidney failure 5/12/14   • Neuropathy    • Personal history of antineoplastic chemotherapy    • Pneumonia due to organism    • Renal disorder     Myeloma kidney   • Stomach ulcer    • Visual impairment        Past Surgical History  Past Surgical Histor -  Dynamic Sitting: Fair -  Static Standing: Not tested  Dynamic Standing: Not tested    ADDITIONAL TESTS                                    NEUROLOGICAL FINDINGS  Neurological Findings: Sensation           Sensation: B LE intact w/ light touch       ACTIV function. Exercise/Education Provided:  Bed mobility  Body mechanics  Posture  ROM  Lower therapeutic exercise: Ankle pumps  Knee extension    Patient End of Session: In bed;Needs met;Call light within reach;RN aware of session/findings; All patient qu moderate assistance     Goal #3 Ambulation goal pending     Goal #4    Goal #5    Goal #6    Goal Comments: Goals established on 1/4/2018

## 2018-01-04 NOTE — PLAN OF CARE
HEMATOLOGIC - ADULT    • Maintains hematologic stability Not Progressing          SKIN/TISSUE INTEGRITY - ADULT    • Incision(s), wounds(s) or drain site(s) healing without S/S of infection Not Progressing          CARDIOVASCULAR - ADULT    • Maintains opt

## 2018-01-04 NOTE — PROGRESS NOTES
[facility]  Progress Note    Elkin Hand Patient Status:  Inpatient    3/6/1955 MRN [id number]   [facility] 4SW-A Attending Bi Fan MD   [facility] Day # 4 PCP Alphonso Logan DO     Subjective:  Elkin Hand is a(n) 58year old m and interactive today, no obvious/new focal deficits    Lab Data Review:  Recent Labs   Lab  01/02/18   0423  01/03/18   0000  01/03/18   0622  01/04/18   0511   RBC  1.88*  2.04*  2.44*  2.70*   HGB  6.0*  6.6*  7.5*  8.3*   HCT  18.2*  19.4*  22.2*  24.8 fluid overload-improving in the face of an 8+ l diuresis over the last 24 hours  · Anion gap acidosis significantly improved  · Leukopenia with bandemia - acutely due to likely septic shock, chronically due to recurrent multiple myeloma and recent chemothe

## 2018-01-04 NOTE — OCCUPATIONAL THERAPY NOTE
OCCUPATIONAL THERAPY EVALUATION - INPATIENT     Room Number: 458/458-A  Evaluation Date: 1/4/2018  Type of Evaluation: Initial  Presenting Problem: Sepsis, multiple myeloma    Physician Order: IP Consult to Occupational Therapy  Reason for Therapy: ADL/IAD HISTORY      Comment: Ulcer - duodenum - surgery- \"clips\" per pt  1/14/08: UPPER GI ENDOSCOPY - REFERRAL      Comment: + H. Pylori / duodenitis  4-14-97: VASECTOMY    OCCUPATIONAL PROFILE    HOME SITUATION  Type of Home: House  Home Layout: Multi-level  L ASSESSMENT  Upper extremity ROM is within functional limits     Upper extremity strength: 3+/5    COORDINATION  Gross Motor    impaired   Fine Motor    impaired     ADDITIONAL TESTS  Dynamometer  Strength Left: 20#  Dynamometer  Strength Right: 20# in place. Patient was educated on place, date and it was written on the communication board to refer back to. Patient easily distracted and environmental distractions minimized.   Patient placed in chair position in bed and tolerated change with stable vi attention, decreased safety judgement, decreased problem solving, decreased orientation.  These deficits impact the patient’s ability to participate in self-care and functional mobility, instrumental activities of daily living, rest and sleep, work, leisure

## 2018-01-04 NOTE — PROGRESS NOTES
Heme/Onc Progress Note -       Chief Complaint:    Follow up for multiple myeloma with sepsis and shock. Interim History:      Intubated and sedated. No external bleeding. Weaning from vent.  Possible extubation today    Physical Examination:    Monica support, vent support. Possible extubation today.      3. Viral Prophylaxis: Continue acyclovir 400 mg bid.     4. Reactivation of hepatitis B: LFTs stable. Follow.     5. Anemia: due to underlying myeloma, s/p transfusion continue to monitor     6.  Acute

## 2018-01-04 NOTE — CONSULTS
[facility] Pharmacy Note:  Discontinuation of Stress Ulcer Prophylaxis     Rea Gu is a 58year old male who has been prescribed stress ulcer prophylaxis with pantoprazole (PROTONIX).     The patient has been taking pantoprazole prior to admission therefore

## 2018-01-04 NOTE — PROCEDURES
[facility]  Procedure Note    Althea Becerril Patient Status:  Inpatient    3/6/1955 MRN [id number]   [facility] 4SW-A Attending Cecile Zamora MD   [address] Day # 3 PCP Wilder Suarez DO     Procedure: PICC right arm    Pre-Procedure Diagn

## 2018-01-04 NOTE — PROGRESS NOTES
[facility]  Progress Note     Reather Edge Patient Status:  Inpatient    3/6/1955 MRN [id number]   [facility] 4SW-A Attending Naseem Baldwin MD   [address] Day # 4 PCP Trudy Sy DO     Chief Complaint: Septic shock    S: Patient ex Clearance: 37.4 mL/min (based on SCr of 2.04 mg/dL (H)). Recent Labs   Lab  01/02/18   0706  01/03/18   0624   PTP  18.4*  16.0*   INR  1.51*  1.27*       No results for input(s): TROP, CK in the last 72 hours.          Imaging: Imaging data reviewed in

## 2018-01-04 NOTE — PROGRESS NOTES
Patient due for second dose of Lasix 60mg tonight. Total UOP since initial 60mg dose given this AM is >6500ml and urine is still draining. Discussed with [facility] on-call, Dr Cornelio Pittman and second dose for today is to be held.   Will check K, Mag, Phos levels now g

## 2018-01-05 NOTE — PROGRESS NOTES
Patient with recurrent hypoglycemia despite administration of D50 and ongoing D10 infusion at 50 ml/h. Will increase D10 rate to 70 ml/h and continue monitoring glucose levels.     IZABEL Lima  Critical Care NP  Melissa [phone]  Pgr: 2

## 2018-01-05 NOTE — DIETARY NOTE
[facility]     NUTRITION FOLLOW UP ASSESSMENT     Pt does not meet malnutrition criteria.           NUTRITION DIAGNOSIS/PROBLEM:     Inadequate energy intake related to inability to consume oral diet as evidenced by NPO and intubation and sedation.     (156 lb 12 oz)   01/02/18 0500 73.5 kg (162 lb 0.6 oz)            NUTRITION:  Diet: General, regular diet  Oral Supplements: none     FOOD/NUTRITION RELATED HISTORY:  Appetite:n/a  Intake: 0%  Intake Meeting Needs: No  Food Allergies: No  Cultural/Ethnic/R

## 2018-01-05 NOTE — PROGRESS NOTES
[facility]  Progress Note     Rea Gu Patient Status:  Inpatient    3/6/1955 MRN [id number]   [facility] 4SW-A Attending Janice Gore MD   [facility] Day # 5 PCP Basil Robertson DO     Chief Complaint: Septic shock    S: Patient do INR  1.27*       No results for input(s): TROP, CK in the last 72 hours. Imaging: Imaging data reviewed in Epic.     Medications:   • ipratropium-albuterol  3 mL Nebulization QID   • sodium chloride   Intravenous Once   • sodium chloride   Dave Du

## 2018-01-05 NOTE — PLAN OF CARE
CARDIOVASCULAR - ADULT    • Maintains optimal cardiac output and hemodynamic stability Not Progressing        METABOLIC/FLUID AND ELECTROLYTES - ADULT    • Glucose maintained within prescribed range Not Progressing        Care taken over at 2330.  Alert and

## 2018-01-05 NOTE — SLP NOTE
ADULT SWALLOWING EVALUATION    ASSESSMENT    ASSESSMENT/OVERALL IMPRESSION:  B/S swallow eval completed upon receipt of MD order.   Per MD note  History of Present Illness: Oumar Vega is a 58year old male with past medical history of multiple myeloma, sips  Medication Administration Recommendations: One pill at a time  Treatment Plan/Recommendations: Dysphagia therapy  Discharge Recommendations/Plan: Undetermined    HISTORY   MEDICAL HISTORY  Reason for Referral: R/O aspiration (extubated 1/4/18)    Pro alert and participatory. OBJECTIVE   ORAL MOTOR EXAMINATION  Dentition: Natural;Functional  Symmetry: Within Functional Limits  Strength:  Within Functional Limits  Tone: Within Functional Limits  Range of Motion: Within Functional Limits  Rate of Motion

## 2018-01-05 NOTE — PLAN OF CARE
Extubated at 0920 to venti mask, then to room air. Levo titrated off for several hours, then restarted to keep SBP >90.   Pt awake/drowsy at times but awakens easily, follows commands, oriented x2-3.  1 unit of platelets given as ordered by Dr. Ronnette Heimlich

## 2018-01-05 NOTE — PLAN OF CARE
Report rec'd from previous RN. Pt resting in bed, vital signs stable, no acute distress noted. Pt remains on room air, with sats greater than 92%. Titrating levo to maintain MAP>65.   Holding PO meds pending swallow eval in am.  Reviewed plan of care wit

## 2018-01-05 NOTE — PHYSICAL THERAPY NOTE
PHYSICAL THERAPY TREATMENT NOTE - INPATIENT    Room Number: 458/458-A     Session: 2   Number of Visits to Meet Established Goals: 3    Presenting Problem: Septic Shock    History related to current admission: Pt is 58year old male admitted on 12/31/2017 History:  1/14/08: COLONOSCOPY      Comment: normal  7/1/14: IR PORT A CATH PROCEDURE      Comment: double lumen power port placed  11/2014: OTHER      Comment: stem cell transplant   1973: OTHER SURGICAL HISTORY      Comment: Ulcer - duodenum - surgery- \ (Decreased step length, kyphotic )  Stoop/Curb Assistance: Not tested       Skilled Therapy Provided: Pt received in egress position in bed and motivated to participate in therapy this afternoon.  Pt transitioned from supine to sitting at EOB w/ supervision assistance if necessary. DISCHARGE RECOMMENDATIONS  PT Discharge Recommendations: Home with home health PT     PLAN  PT Treatment Plan: Bed mobility; Body mechanics; Endurance; Patient education;Gait training;Strengthening;Stoop training;Stair training;Tr

## 2018-01-05 NOTE — PROGRESS NOTES
[facility]  Progress Note    Carry Bending Patient Status:  Inpatient    3/6/1955 MRN [id number]   [facility] 4SW-A Attending Althea Antonio MD   [facility] Day # 5 PCP Artur Almaraz, DO     STATUS UPDATE: The patient passed a ventilator we 95.1  91.0  91.9  90.9   MCH  32.4  30.7  30.7  30.7   MCHC  34.0  33.8  33.5  33.8   RDW  22.7*  22.3*  23.2*  22.4*   NEPRELIM  7.09*   --   2.61  2.46   WBC  8.1  7.6  3.0*  3.0*   PLT  35.0*  35.0*  24.0*  36.0*     Recent Labs   Lab  01/03/18   0622 and recent chemotherapy  · Pancytopenia due to recurrent multiple myeloma and recent chemotherapy plus sepsis  · Recurrent mutiple myeloma on palliative chemotherapy - last received 12/28/17  · Reactivated HBV  · GERD, h/o duodenal ulcers  · Marked hypoalb

## 2018-01-05 NOTE — PROGRESS NOTES
Heme/Onc Progress Note - [facility]      Chief Complaint:    Follow up for multiple myeloma with sepsis and shock. Interim History:      The patient is extubated. He is comfortable, alert and oriented. He has no pain. He has no chest pain or dyspnea.    Pulmonary critical care following. He has positive blood cultures from strep pneumoniae. Also positive for coronavirus.  Patient continues on IV antibiotics with Meropenem.      3. Viral Prophylaxis: Continue acyclovir 400 mg bid.     4. Reactivation o

## 2018-01-05 NOTE — OCCUPATIONAL THERAPY NOTE
OCCUPATIONAL THERAPY TREATMENT NOTE - INPATIENT     Room Number: 458/458-A  Session: 1   Number of Visits to Meet Established Goals: 7    Presenting Problem: Sepsis, multiple myeloma    History related to current admission: Pt is 58year old male admitted family waiting for me at home. My Alev[employer] is also waiting for me. \"  Patient emotional when stating what motivates him to work towards goal of going home. Patient self-stated goal is to go home.     OBJECTIVE  Precautions: Limb alert - left;Limb alert - light within reach;RN aware of session/findings; All patient questions and concerns addressed;SCDs in place    ASSESSMENT   Patient demonstrates much improved alertness, attention, orientation and ability to follow motor commands.   Patient also demonstrates

## 2018-01-05 NOTE — PROGRESS NOTES
[facility] Pharmacy Note: Route Optimization for Pantoprazole (PROTONIX)    Patient is currently on Pantoprazole (PROTONIX) 40 mg IV every 24 hours.    The patient meets the criteria to convert to the oral equivalent as established by the IV to Oral conversion pro

## 2018-01-06 NOTE — PLAN OF CARE
Received pt with levophed & D10 infusing and posada in place. Pt is AOx4 but forgetful. Pt denies any pain and is afebrile. Pt refused repositioning throughout the night.  CT of abdomen was done and showed gas in the biliary tree, Lorna PAIZ notified, no new

## 2018-01-06 NOTE — PLAN OF CARE
Pt alert/oriented x4, forgetful at times but reorients easily. Low dose Levo titrated to keep MAP >65. Patient denies pain, afebrile. Passed swallow eval with Speech therapy. Tolerating regular diet for lunch and dinner, poor appetite.   Ambulated in ha

## 2018-01-06 NOTE — PROGRESS NOTES
[facility]  Progress Note     Ruth  Patient Status:  Inpatient    3/6/1955 MRN [id number]   [facility] 4SW-A Attending Karissa Rendon MD   [facility] Day # 6 PCP Simon Yañez DO     Chief Complaint: Septic shock    S: Feels well Intravenous Q8H   • meropenem  500 mg Intravenous Q12H   • Difluprednate  1 drop Left Eye Daily       ASSESSMENT / PLAN:     1. Septic Shock 2/2 Strep bacteremia/PNA, coronavirus  1. Wean pressor, Cont Abx   2. Acute encephalopathy 2/2 above, improved  3.

## 2018-01-06 NOTE — PROGRESS NOTES
[facility]  Progress Note    Lidia Freed Patient Status:  Inpatient    3/6/1955 MRN [id number]   [facility] 4SW-A Attending Rodrigue Garber MD   [facility] Day # 6 PCP Lauro Degroot DO     Subjective:  Lidia Freed is a(n) 58year old m Problem List:     Hx of gastroesophageal reflux (GERD)     Light chain disease, lambda type (HCC)     Vitamin D deficiency     History of compression fracture of spine,lumbar,cervical- Multiple Myeloma     Reactivation of hepatitis B viral hepatitis     Fa Jovi Aguiar for hep C? B    Continues to clinically improve  elevated alkaline phosphatase ?  Liver vs bone with bony mets   Ongoing hypoglycemia requiring D10  We will ask GI for input    Luigi El MD  1/6/2018  1:36 PM

## 2018-01-06 NOTE — PROGRESS NOTES
Heme/Onc Progress Note      Chief Complaint:    Follow up for multiple myeloma with sepsis and shock. Interim History:    Awake and doing ok.  Says his breathing is not bad      Physical Examination:    Vital Signs: /64   Pulse 83   Temp 98.8 °F (3 myeloma, therapy. Transfuse for Hgb <7.      6. Thrombocytopenia:    Acute on chronic. Has chronic thrombocytopenia due to underlying myeloma but acute decrease likely secondary to sepsis/shock/DIC. Platelets are stable overall and will continue to follow.

## 2018-01-06 NOTE — PLAN OF CARE
Result of US called to [facility] APN    CONCLUSION:  Limited examination. There is suggestion of pneumobilia. This correlates with prior CT exam.  There is suggestion of gas in the gallbladder fundus.   This also correlates with prior CT exam.    Pt has been ma

## 2018-01-07 NOTE — PROGRESS NOTES
Heme/Onc Progress Note      Chief Complaint:    Follow up for multiple myeloma with sepsis and shock. Interim History:    He says he feels ok given the circumstances.  Afebrile      Physical Examination:    Vital Signs: /61   Pulse 93   Temp 98.9 ° acyclovir 400 mg bid.     4. Anemia:      Due to underlying myeloma, therapy. Transfuse for Hgb <7. Has been stable     6. Thrombocytopenia:    Acute on chronic.  Has chronic thrombocytopenia due to underlying myeloma but acute decrease likely secondary to

## 2018-01-07 NOTE — PROGRESS NOTES
[facility]  Progress Note     Nancy Patino Patient Status:  Inpatient    3/6/1955 MRN [id number]   [facility] 4SW-A Attending Parker Beltran MD   [address] Day # 7 PCP aCdence Jones DO     Chief Complaint: Septic shock    S: BP still l Intravenous Q8H   • meropenem  500 mg Intravenous Q12H   • Difluprednate  1 drop Left Eye Daily       ASSESSMENT / PLAN:     1. Septic Shock 2/2 Strep bacteremia/PNA, coronavirus  1. Wean pressor, Cont Abx   2.  Pneumobilia - chronic, eval by Gi, can f/u op

## 2018-01-07 NOTE — PLAN OF CARE
Pt care assumed this am. Pt on room air , levophed gtt at 2 mcqs, d10 at 90 ml. Serum lab level did not coincide with pts accucheck, lab redrawn after speaking with apn.  Serum critical glucose at 62, pt was treated with amp d50 prior to result for accuchec

## 2018-01-07 NOTE — CONSULTS
Gastroenterology Initial Consultation  I have personally seen and examined the patient.     Patient Name: Elkin Hand  Referring physician: Dr. Destiney Edwards  Reason for consultation: Pneumobilia  CC: Pneumobilia  HPI: This is a 59 yo male with advanced Multiple Neuropathy    • Personal history of antineoplastic chemotherapy    • Pneumonia due to organism    • Renal disorder     Myeloma kidney   • Stomach ulcer    • Visual impairment      PSHx: Past Surgical History:  1/14/08: COLONOSCOPY      Comment: normal  7/1 0.9%  NaCl infusion  Intravenous Once   dextrose 50% injection 50 mL 50 mL Intravenous Q15 Min PRN   [COMPLETED] dextrose 10 % 500 mL with sodium bicarbonate 100 mEq infusion  Intravenous Once   [COMPLETED] propofol (DIPRIVAN) 10 MG/ML injection      aceta use or other illicit substances. FamHx: The patient has no family history of colon cancer or other gastrointestinal malignancies;   No family history of ulcer disease, or inflammatory bowel disease  ROS:  In addition to the pertinent positives described ab hepatosplenomegaly; no rebound or guarding;  No ascites is clinically apparent; no tympany to percussion; midline, well healed scar consistent with his report of abdominal surgery in 1973  Ext: No peripheral edema or cyanosis  Skin: Warm and dry  Psychiatri No significant masses. BILIARY:  Pneumobilia is noted. The common bile duct is not visualized. The gallbladder fundus likely contains gas. PANCREAS:  The pancreas is obscured by overlying bowel gas.   SPLEEN:  Normal.  KIDNEYS:  Normal.  Right kidney me therefore gas could have distended the stomach. If the patient has not had a sphincterotomy, the possibility of cholangitis with gas-forming organism should be considered. Further evaluation with Gastroenterology would be   recommended.   PANCREAS:  No le nurse Felicitas Filter at [phone] hr on 1/5/2018.            Dictated by: Camila Farrell MD on 1/05/2018 at 23:30       Approved by: Camila Farrell MD            PROCEDURE:  CT OF THE CHEST WITHOUT CONTRAST     COMPARISON:  GENE MAGUIRE CHEST AP PORTABLE  (CPT=71010), 5/3 Pneumobilia from prior procedures. Santosh Jovany BONES:  There is diffuse changes of multiple myeloma through almost every visualized bone. Several compression fractures are noted throughout the thoracic and lumbar spine. .     CONCLUSION:       1.   Bilateral mixed int both counseling and coordination of care.

## 2018-01-07 NOTE — PLAN OF CARE
Received pt with levophed and D20 infusing. Pt is AOx4, denies any pain and is adequately saturating on room air. Levophed being titrated for a map >65. 1amp D50 given for blood glucose of 64. Pt updated on plan of care.      CARDIOVASCULAR - ADULT    • Nilam

## 2018-01-07 NOTE — PROGRESS NOTES
[facility]  Progress Note    Dylan Kimble Patient Status:  Inpatient    3/6/1955 MRN [id number]   [facility] 4SW-A Attending Maulik Ledezma MD   [facility] Day # 7 PCP Dahiana Garcia DO     Subjective:  Dylan Kimble is a(n) 58year old m Light chain disease, lambda type (HCC)     Vitamin D deficiency     History of compression fracture of spine,lumbar,cervical- Multiple Myeloma     Reactivation of hepatitis B viral hepatitis     Fatigue due to treatment     Hepatitis B virus infection

## 2018-01-07 NOTE — PLAN OF CARE
METABOLIC/FLUID AND ELECTROLYTES - ADULT    • Glucose maintained within prescribed range Not Progressing        SKIN/TISSUE INTEGRITY - ADULT    • Skin integrity remains intact Not Progressing          CARDIOVASCULAR - ADULT    • Maintains optimal cardiac

## 2018-01-08 NOTE — PROGRESS NOTES
Heme/Onc Progress Note      Interim History:    Patient has no new complaints. Minimal cough. Fatigue.      Physical Examination:  /60   Pulse 103   Temp 99.6 °F (37.6 °C) (Oral)   Resp 21   Wt 68.3 kg (150 lb 9.2 oz)   SpO2 94%   BMI 22.18 kg/m² 0.90 - 4.00 x10(3) uL   Monocyte Absolute 0.15 0.10 - 0.60 x10(3) uL   Eosinophil Absolute 0.01 0.00 - 0.30 x10(3) uL   Basophil Absolute 0.00 0.00 - 0.10 x10(3) uL   Immature Granulocyte Absolute 0.04 0.00 - 1.00 x10(3) uL   Neutrophil % 83.0 %   [facility]  Director, Bone and Soft Tissue Sarcoma Program  Section of Hematology/Oncology, MILY PAYTON

## 2018-01-08 NOTE — PROGRESS NOTES
[facility]  Progress Note    Elsa Burnett Patient Status:  Inpatient    3/6/1955 MRN [id number]   [facility] 4SW-A Attending Aguilar Cardoza MD   [facility] Day # 8 PCP Zahraa Milton DO     Subjective:  Elsa Burnett is a(n) 58year old m MCHC  33.3  33.6  33.3   RDW  21.6*  21.3*  21.0*   NEPRELIM  1.93  2.39  2.85   WBC  2.5*  2.9*  3.5*   PLT  29.0*  29.0*  29.0*     Recent Labs   Lab  01/06/18   0838  01/07/18   0441  01/08/18   0430   GLU  62*  70  81   BUN  16  14  15   CREATSERUM

## 2018-01-08 NOTE — CM/SW NOTE
01/08/18 1400   CM/SW Referral Data   Referral Source Physician   Reason for Referral Discharge planning   Informant Patient   Social History   Recreational Drug/Alcohol Use no   Major Changes Last 6 Months no   Domestic/Partner Violence no   Suicidal I

## 2018-01-08 NOTE — PLAN OF CARE
CARDIOVASCULAR - ADULT    • Maintains optimal cardiac output and hemodynamic stability Progressing        Able to wean off Levophed drip at 0430H, BP was stable after, maintained MAP at 60 to 65 mm Hg, watched out for hypotension. AM care this morning.

## 2018-01-08 NOTE — SLP NOTE
SPEECH DAILY NOTE - INPATIENT    ASSESSMENT & PLAN   ASSESSMENT  Pt seen for meal assess/dysphagia therapy to monitor po tolerance of recommended diet and ensure adherence to aspiration precautions. Pt alert and sitting upright in bed.   Pt self feeding br (1-2)    Session: 1    If you have any questions, please contact Herlene Baumgarten.  Leelee Rosas M.S.,CCC-SLP  Speech Language Pathologist

## 2018-01-08 NOTE — PLAN OF CARE
CARDIOVASCULAR - ADULT    • Maintains optimal cardiac output and hemodynamic stability Progressing        HEMATOLOGIC - ADULT    • Maintains hematologic stability Progressing        Impaired Swallowing    • Minimize aspiration risk Progressing        METAB

## 2018-01-08 NOTE — PROGRESS NOTES
[facility]  Progress Note     Abdelrahman Langston Patient Status:  Inpatient    3/6/1955 MRN [id number]   [facility] 4SW-A Attending Cami Wallace MD   [facility] Day # 8 PCP Adali Andre DO     Chief Complaint: Septic shock    S: BP still l AC   • meropenem  500 mg Intravenous Q12H       ASSESSMENT / PLAN:     1. Septic Shock 2/2 Strep bacteremia/PNA, coronavirus  1. Wean pressor, add midodrine? 2. Cont Abx   2. Pneumobilia  1. chronic, eval by Gi, can f/u opt   3.  Acute encephalopathy 2/2 a

## 2018-01-08 NOTE — DIETARY NOTE
Nutrition Short Note      Calorie count ordered. Envelope hanging on the door. Calorie count to run from Breakfast 1/8 through [employer] 1/10.   Pt reports liking the Ensure Enlive had one last night for the first time and getting ready to drink one this mor

## 2018-01-08 NOTE — OCCUPATIONAL THERAPY NOTE
OCCUPATIONAL THERAPY TREATMENT NOTE - INPATIENT     Room Number: 458/458-A  Session: 2  Number of Visits to Meet Established Goals: 7    Presenting Problem: Sepsis, multiple myeloma    History related to current admission: Pt is 58year old male admitted o duodenitis  4-14-97: VASECTOMY    SUBJECTIVE  \"I'm tired but I will do what I need to do to get home. \"  Patient self-stated goal is to go home.     OBJECTIVE  Precautions: Limb alert - left;Limb alert - right    WEIGHT BEARING RESTRICTION  Weight Bearing general strength. Patient continues to require skilled OT intervention to maximize ADL I and safety d/t decreased balance, endurance, strength and higher level cognitive abilities.   Patient continues to be appropriate for discharge of home with HHPT/OT, w

## 2018-01-08 NOTE — PHYSICAL THERAPY NOTE
PHYSICAL THERAPY TREATMENT NOTE - INPATIENT    Room Number: 458/458-A     Session: 3     Number of Visits to Meet Established Goals: 3 (additional 3 visits to start 1/9/18)    Presenting Problem: Septic Shock     History related to current admission:  Pt Stomach ulcer    • Visual impairment        Past Surgical History  Past Surgical History:  1/14/08: COLONOSCOPY      Comment: normal  7/1/14: IR PORT A CATH PROCEDURE      Comment: double lumen power port placed  11/2014: OTHER      Comment: stem cell corral (ft): 60  Assistive Device: Rolling walker  Pattern: Comment (kyphotic, dec in step length, dec in tadeo. )  Stoop/Curb Assistance: Not tested  Comment : na      Bed Mobility  Rolling to the Right = mod indep  Rolling to the Left = mod indep  Supine to S 1/4/2018.     1/8/2018 all goals ongoing.

## 2018-01-09 NOTE — PROGRESS NOTES
[facility]  Progress Note     Julissa Rosa Patient Status:  Inpatient    3/6/1955 MRN [id number]   [facility] 4SW-A Attending Neyda Hutchinson MD   [facility] Day # 9 PCP Ana Fraser DO     Chief Complaint: Septic shock    S: reports fe coronavirus  1. Wean pressor, add midodrine  2. Meropenem Day 9/14 - reviewed with pharmacy  2. Pneumobilia  1. chronic, eval by GI, can f/u opt   3. Acute encephalopathy 2/2 above, improved  4. Acute Hypoxic Respiratory Failure, resolved extubated 1/4  5.

## 2018-01-09 NOTE — PLAN OF CARE
CARDIOVASCULAR - ADULT    • Maintains optimal cardiac output and hemodynamic stability Progressing        METABOLIC/FLUID AND ELECTROLYTES - ADULT    • Glucose maintained within prescribed range Progressing        NEUROLOGICAL - ADULT    • Achieves stable

## 2018-01-09 NOTE — PLAN OF CARE
CARDIOVASCULAR - ADULT    • Maintains optimal cardiac output and hemodynamic stability Progressing        METABOLIC/FLUID AND ELECTROLYTES - ADULT    • Glucose maintained within prescribed range Progressing        RESPIRATORY - ADULT    • Achieves optimal

## 2018-01-09 NOTE — OCCUPATIONAL THERAPY NOTE
OCCUPATIONAL THERAPY TREATMENT NOTE - INPATIENT     Room Number: 458/458-A  Session: 3  Number of Visits to Meet Established Goals: 7    Presenting Problem: Sepsis, multiple myeloma    History related to current admission: Pt is 58year old male admitted o REFERRAL      Comment: + H. Pylori / duodenitis  4-14-97: VASECTOMY    SUBJECTIVE  \"I just want to get out of here. \"  Patient self-stated goal is to go home.     OBJECTIVE  Precautions: Limb alert - left;Limb alert - right    WEIGHT BEARING RESTRICTION  We energy conservation techniques. Patient End of Session: Up in chair;Needs met;Call light within reach;RN aware of session/findings; All patient questions and concerns addressed    ASSESSMENT   Patient demonstrates much improved alertness, attention, orien

## 2018-01-09 NOTE — PROGRESS NOTES
SW received call from patient's wife advising that the patient has been depressed, and feeling alone. She and other family members have been sick, and they don't think they should visit. She is asking if a volunteer could spend some time with him.     Elma Persaud

## 2018-01-09 NOTE — PROGRESS NOTES
Heme/Onc Progress Note      Interim History:    Patient has no new complaints. Minimal cough. Wants to get out of the ICU.     Physical Examination:  BP 98/54   Pulse 100   Temp 98.1 °F (36.7 °C) (Temporal)   Resp 24   Wt 68.3 kg (150 lb 9.2 oz)   SpO2 94% Septic shock: Blood cultures from OSH positive 2/2 for S. pneumoniae. Repeat cultures negative. Remains on abx as per critical care. Remains on pressors. Patient has hypogammaglobulinemia which is expected with myeloma therapy.  This may have contributed to

## 2018-01-09 NOTE — CM/SW NOTE
The patient has been accepted by [facility]: [phone]. They will need periodic updates and will need to be informed when the patient is dc'd.     Yvonne Abarca LCSW

## 2018-01-09 NOTE — PROGRESS NOTES
[facility]  Progress Note    Thor Matute Patient Status:  Inpatient    3/6/1955 MRN [id number]   [facility] 4SW-A Attending Reny Nunes MD   [address] [facility] [address] Day # 9 PCP Nani Michelle DO     Subjective:  Thor Matute is a(n) 58year old m 3. 5*  4.5   PLT  29.0*  29.0*  37.0*     Recent Labs   Lab  01/07/18   0441  01/08/18   0430  01/09/18   0358   GLU  70  81  80   BUN  14  15  16   CREATSERUM  1.77*  1.74*  1.77*   CA  7.8*  8.0*  8.3   NA  140  139  137   K  3.6  3.9  3.9   CL  109  111

## 2018-01-10 NOTE — CONSULTS
[facility]      Endocrinology Consultation    Althea Becerril Patient Status:  Inpatient    3/6/1955 MRN [id number]   [facility] 4SW-A Attending Gasper Burgos MD   [address] Day # 8 PCP Wilder Suarez DO     Reason for Consultation:  Hypogly BREAKFAST Disp: 90 tablet Rfl: 0 12/30/2017 at Unknown time   sodium bicarbonate 650 MG Oral Tab Take 1 tablet (650 mg total) by mouth 3 (three) times daily.  Disp: 90 tablet Rfl: 5 12/30/2017 at Unknown time   ALLOPURINOL 100 MG Oral Tab TAKE 1 TABLET(100 organism    • Renal disorder     Myeloma kidney   • Stomach ulcer    • Visual impairment      Past Surgical History:  1/14/08: COLONOSCOPY      Comment: normal  7/1/14: IR PORT A CATH PROCEDURE      Comment: double lumen power port placed  11/2014: OTHER Intravenous, Q15 Min PRN  •  acetaminophen (TYLENOL) tab 650 mg, 650 mg, Oral, Q6H PRN **OR** acetaminophen (TYLENOL) 160 MG/5ML oral liquid 650 mg, 650 mg, Oral, Q6H PRN **OR** acetaminophen (TYLENOL) 650 MG rectal suppository 650 mg, 650 mg, Rectal, Q6H Latest Units: ug/dL 16.1     Recent Labs   01/07/18  1624 01/07/18  1821 01/07/18  2302 01/08/18  0545 01/08/18  1153 01/08/18  1716 01/08/18  2345 01/09/18  9617 01/09/18  1143 01/09/18  1406 01/09/18  1748 01/09/18  2009 01/10/18  0130 01/10/18  0424 01/

## 2018-01-10 NOTE — PROGRESS NOTES
[facility]  Progress Note     Sole Dean Patient Status:  Inpatient    3/6/1955 MRN [id number]   [facility] 4SW-A Attending Vera Colvin MD   [facility] Day # 8 PCP Juliana Lyons DO     Chief Complaint: Septic shock    S: reports h coronavirus  1. Off pressor currently, midodrine increased today per pulm  2. Meropenem Day 10/14 - possibly narrow abx? 3. Cultures were positive from [place]  2. Acute encephalopathy 2/2 above, resolved  3.  Acute Hypoxic Respiratory Failure, resolve

## 2018-01-10 NOTE — CONSULTS
Pharmacy Consult Note:  Joe Xiao is a 58year old male for whom Pharmacy has been consulted to evaluate his current medications as to their likelihood of causing hypoglycemia.     Current Facility-Administered Medications:  Midodrine HCl (PROAMATINE) incidence)         Thank you for the consult. Please let us know if pharmacy can provide further assistance in the care of this patient.     Queta Juan, PharmD  1/10/2018  8:59 AM

## 2018-01-10 NOTE — PLAN OF CARE
Diabetes/Glucose Control    • Glucose maintained within prescribed range Not Progressing          HEMATOLOGIC - ADULT    • Maintains hematologic stability Progressing          CARDIOVASCULAR - ADULT    • Maintains optimal cardiac output and hemodynamic sta

## 2018-01-10 NOTE — DIETARY NOTE
Nutrition Short Note      Calorie count ordered. Envelope hanging on the door. Calorie count to run from Breakfast 1/8 through [employer] 1/10. Per RN pt not drinking Ensure Jack, RN encouraging pt to eat and spoke with wife who reports pt likes nuts.   RN

## 2018-01-10 NOTE — PROGRESS NOTES
[facility]  Progress Note    Pedro Benton Patient Status:  Inpatient    3/6/1955 MRN [id number]   [facility] 4SW-A Attending Charlene Alba MD   [address] Day # 10 PCP Justin Ruelas DO     Subjective:  Pedro Benton is a(n) 58year old ma 21.0*  20.8*  20.7*   NEPRELIM  2.85   --    --    WBC  3.5*  4.5  5.7   PLT  29.0*  37.0*  49.0*     Recent Labs   Lab  01/08/18   0430 01/09/18   0358  01/10/18   0434   GLU  81  80  67*   BUN  15  16  21*   CREATSERUM  1.74*  1.77*  2.22*   CA  8.0*

## 2018-01-11 NOTE — PROGRESS NOTES
[facility]  Endocrinology Progress Note    Elkin Barclaymsted Patient Status:  Inpatient    3/6/1955 MRN [id number]   [facility] 4SW-A Attending Macarena Staton MD   [facility] Day # 11 PCP Oliva Carrel, DO     Subjective:   We started hydrocortiso 01/10/18  1010 01/10/18  1141   PGLU 93 97 94 94 96 88 85 97 79 82 75 78 74 66 160* 91 85 89 86     Results for Errol Catching (MRN [id number]) as of 1/11/2018 13:32   Ref.  Range 1/10/2018 16:24 1/10/2018 19:39 1/10/2018 23:39 1/11/2018 04:27 1/11/2018 09:

## 2018-01-11 NOTE — DIETARY NOTE
Nutrition Short Note        Calorie count ordered. Envelope hanging on the door. Calorie count to run from Breakfast 1/8 through [employer] 1/10. Per RN pt not drinking Yuriy Santiago, RN encouraging pt to eat and spoke with wife who reports pt likes nuts.

## 2018-01-11 NOTE — PROGRESS NOTES
[facility]  Progress Note     Faulk Evaristo Patient Status:  Inpatient    3/6/1955 MRN [id number]   [facility] 4SW-A Attending Keira Gonsalves MD   [address] Day # 6 PCP Jareth Egan DO     Chief Complaint: Septic shock    S: pt has been 1. Septic Shock 2/2 Strep bacteremia/PNA, coronavirus  1. Off pressor currently, midodrine/steroids  2. Meropenem Day 11/14 - possibly narrow abx? 3. Cultures were positive from [place]  2. Shock 2/2 sepsis/adrenal insuff  3.  Acute encephalopathy

## 2018-01-11 NOTE — PROGRESS NOTES
Heme/Onc Progress Note      Interim History:    Patient has no new complaints. Up walking today. Off pressors.      Physical Examination:  /60   Pulse 79   Temp 98.2 °F (36.8 °C) (Temporal)   Resp 21   Wt 62.2 kg (137 lb 2 oz)   SpO2 96%   BMI 20.19 k mmol/L   CO2 15.0 (L) 22.0 - 32.0 mmol/L   -POCT GLUCOSE   Collection Time: 01/11/18  9:33 AM   Result Value Ref Range   POC Glucose 139 (H) 65 - 99 mg/dL     Impression:   1.  Septic shock: Improving off pressors, afebrile, Blood cultures from OSH positive

## 2018-01-11 NOTE — PROGRESS NOTES
[facility]  Progress Note    Lynn Mojica Patient Status:  Inpatient    3/6/1955 MRN [id number]   [facility] 4SW-A Attending Kandice Disla MD   [facility] Day # 6 PCP Andreina Hutchins DO     Subjective:  Lynn Mojica is a(n) 58year old ma Hx of gastroesophageal reflux (GERD)     Light chain disease, lambda type (HCC)     Vitamin D deficiency     History of compression fracture of spine,lumbar,cervical- Multiple Myeloma     Reactivation of hepatitis B viral hepatitis     Fatigue due to treat

## 2018-01-11 NOTE — CM/SW NOTE
[facility] called [phone]. Advised that pt is still in ICU.     Valarie Carlos, 01/11/18, 3:06 PM

## 2018-01-11 NOTE — PROGRESS NOTES
Heme/Onc Progress Note      Interim History:    Patient has no new complaints. Minimal cough.      Physical Examination:  BP 92/55   Pulse 95   Temp 98.1 °F (36.7 °C) (Temporal)   Resp 21   Wt 68.3 kg (150 lb 9.2 oz)   SpO2 98%   BMI 22.18 kg/m²      Consti Phosphatase 322 (H) 45 - 117 U/L   AST 34 15 - 41 U/L   Alt 33 17 - 63 U/L   Bilirubin, Total 1.1 0.1 - 2.0 mg/dL   Total Protein 6.9 6.1 - 8.3 g/dL   Albumin 2.2 (L) 3.5 - 4.8 g/dL   Bilirubin, Direct 0.5 0.1 - 0.5 mg/dL   -POCT GLUCOSE   Collection Time[facility]

## 2018-01-12 NOTE — PROGRESS NOTES
Heme/Onc Progress Note      Interim History:    Patient sitting up in chair, awaiting bed for transfer     Physical Examination:  /55   Pulse 94   Temp 98.6 °F (37 °C) (Temporal)   Resp 21   Wt 64.5 kg (142 lb 3.2 oz)   SpO2 97%   BMI 20.94 kg/m² 4.00 x10(3) uL   Monocyte Absolute 0.61 (H) 0.10 - 0.60 x10(3) uL   Eosinophil Absolute 0.01 0.00 - 0.30 x10(3) uL   Basophil Absolute 0.01 0.00 - 0.10 x10(3) uL   Immature Granulocyte Absolute 0.08 0.00 - 1.00 x10(3) uL   Neutrophil % 69.9 %   Lymphocyte Cells None Seen Small /LPF   Renal Tubular Epithelial Cells None Seen Small /LPF   Transitional Cells None Seen Small /LPF   Mucous Urine 1+ (A) None Seen   Yeast Urine None Seen None Seen   Non-Squamous Epithelial None Seen None Seen   -UREA NITROGEN, U

## 2018-01-12 NOTE — PLAN OF CARE
Report given to receiving RN. Patient declined to have spouse notified, he will call her. 1630-taken to new unit via wheelchair.

## 2018-01-12 NOTE — CONSULTS
[facility]  Report of Consultation    Maulik Stanley Patient Status:  Inpatient    3/6/1955 MRN [id number]   [facility] 4SW-A Attending iVrginia Bernabe MD   [address] Day # 15 PCP Kandice Howell DO     Reason for Consultation:  JENNIE/CKD 4    Hi not drink alcohol or use drugs.     Allergies:    Pcn [Penicillins]           Comment:Lost voice  Revlimid [Lenalidom*        Comment:Liver problems  Tetracycline            Rash    Comment:Infection  Vancomycin              Fever, Other (See Comments)    C pain  Denies N/V/D  Denies change in urinary habits or gross hematuria  Denies LE edema  Denies skin rashes/myalgias/arthralgias      Physical Exam:   /55 (BP Location: Left arm)   Pulse 94   Temp 98.6 °F (37 °C) (Temporal)   Resp 21   Wt 142 lb 3.2 (mg/dL)   Date Value   08/09/2014 2.01 (H)   08/08/2014 2.11 (H)   08/07/2014 1.97 (H)   ----------  Creatinine (mg/dL)   Date Value   01/12/2018 2.94 (H)   01/11/2018 2.72 (H)   01/10/2018 2.22 (H)   ----------    No results found for: MICROALBCREA    Rec

## 2018-01-12 NOTE — PLAN OF CARE
RESPIRATORY - ADULT    • Achieves optimal ventilation and oxygenation Progressing          METABOLIC/FLUID AND ELECTROLYTES - ADULT    • Electrolytes maintained within normal limits Progressing    • Hemodynamic stability and optimal renal function maintain

## 2018-01-12 NOTE — PHYSICAL THERAPY NOTE
PHYSICAL THERAPY TREATMENT NOTE - INPATIENT    Room Number: 458/458-A     Session: 2  Number of Visits to Meet Established Goals: 3 (additional 3 visits to start 1/9/18)    Presenting Problem: Septic Shock     Problem List  Active Problems:    Multiple my -  Dynamic Standing: Fair -    ACTIVITY TOLERANCE  Room air  Blood Pressure: Supine 113/59, Sitting 95/55 and Standing 580/87    AM-PAC '6-Clicks' INPATIENT SHORT FORM - BASIC MOBILITY  How much difficulty does the patient currently have. ..  -   Turning ov Patient End of Session: Up in chair;Needs met;Call light within reach;RN aware of session/findings; All patient questions and concerns addressed    ASSESSMENT   Pt seen for transfer, gait, endurance and balance training and therapeutic exercises.  Pt dem

## 2018-01-12 NOTE — PROGRESS NOTES
[facility]  Progress Note    Reina Hollingsworth Patient Status:  Inpatient    3/6/1955 MRN [id number]   [facility] 4SW-A Attending Mando Polk MD   [facility] Day # 12 PCP Dariel Bojorquez DO     Subjective:  Reina Hollingsworth is a(n) 58year old ma 33.6   RDW  20.7*  20.1*  20.5*   NEPRELIM   --    --   4.79   WBC  5.7  5.2  6.9   PLT  49.0*  51.0*  67.0*     Recent Labs   Lab  01/10/18   0434  01/11/18   0450  01/12/18   0425   GLU  67*  125*  74   BUN  21*  26*  31*   CREATSERUM  2.22*  2.72*  2.9

## 2018-01-12 NOTE — DIETARY NOTE
[facility]     NUTRITION FOLLOW UP ASSESSMENT     Pt does not meet malnutrition criteria.           NUTRITION DIAGNOSIS/PROBLEM:     Inadequate energy intake related to inability to consume oral diet as evidenced by NPO and intubations- resolved; pt i poor  Intake: <50%  Intake Meeting Needs: No     ANTHROPOMETRICS:  Ht:  175.5 cm  Wt: 64.5kg (142 lbs). This is 88 % of IBW  BMI: Body mass index is 22.18 kg/m².   IBW: 73 kg     Wt Readings from Last 6 Encounters:  01/02/18 : 73.5 kg (162 lb 0.6 oz)  12/28

## 2018-01-12 NOTE — PROGRESS NOTES
[facility]  Progress Note     Reather Edge Patient Status:  Inpatient    3/6/1955 MRN [id number]   [facility] 4SW-A Attending Max Perea MD   [facility] Day # 15 PCP Trudy Sy DO     Chief Complaint: Septic shock    S: pt doing a mg Oral QAM AC       ASSESSMENT / PLAN:     1. Septic Shock 2/2 Strep bacteremia/PNA, coronavirus  1. Off pressor currently, midodrine/steroids  2. Meropenem Day 12/14 - possibly narrow abx? 3. Cultures were positive from [place]  2.  Shock 2/2 sepsis/

## 2018-01-12 NOTE — PROGRESS NOTES
[facility]  Endocrinology Progress Note    Julissa Rosa Patient Status:  Inpatient    3/6/1955 MRN [id number]   [facility] 4SW-A Attending Shilpi Ryan MD   [facility] Day # 15 PCP Ana Fraser DO     CC: hypoglycemia, hypotension    Sub hypoglycemia  1.  Hypoglycemia, hypotension  - Pt has been on intermittent dexamethasone 20 mg for several days per month x years due to MM treatment   - am cortisol 16.1 on 1/10/18 and would expect it to be higher for pt in septic shock  - hypotension and

## 2018-01-13 PROBLEM — N18.30 CKD (CHRONIC KIDNEY DISEASE) STAGE 3, GFR 30-59 ML/MIN (HCC): Status: ACTIVE | Noted: 2017-01-01

## 2018-01-13 NOTE — PROGRESS NOTES
Heme/Onc Progress Note      Interim History:    Patient feels well. Has no specific complaints.      Physical Examination:  /67 (BP Location: Left arm)   Pulse 74   Temp 98.1 °F (36.7 °C) (Oral)   Resp 16   Wt 66.7 kg (147 lb)   SpO2 97%   BMI 21.65 k Magnesium 1.9 1.7 - 3.0 mg/dL   -PHOSPHORUS   Collection Time: 01/13/18  6:00 AM   Result Value Ref Range   Phosphorus 5.1 (H) 2.5 - 4.9 mg/dL   -POCT GLUCOSE   Collection Time: 01/13/18  6:57 AM   Result Value Ref Range   POC Glucose 71 65 - 99 mg/dL

## 2018-01-13 NOTE — PROGRESS NOTES
[facility]  Progress Note    Roro Bonilla Patient Status:  Inpatient    3/6/1955 MRN [id number]   [facility] 4NW-A Attending Alfonso Rodríguez MD   [facility] Day # 15 PCP Bekah Silvestre DO     Subjective:  Roro Bonilla is a(n) 58year old ma BUN  26*  31*  36*   CREATSERUM  2.72*  2.94*  2.78*   CA  8.5  8.0*  7.6*   NA  136  139  143   K  3.9  3.8  3.3*   CL  108  110  119*   CO2  15.0*  15.0*  14.0*     No results for input(s): ABGPHT, TMFNOT0X, DZKEX7U, ABGHCO3, ABGBE, TEMP, STORMY, SITE,

## 2018-01-13 NOTE — PROGRESS NOTES
Pt received from ICU via wheelchair, aaox4, denies pain, encouraged pt to eat, ivf infusing via FABIEN PICC line, monitored.

## 2018-01-13 NOTE — PROGRESS NOTES
[facility]  Nephrology Progress Note    Oumar Vega Attending:  Javan Hernandez MD       Assessment and Plan:    1) JENNIE- due to mild volume depletion / hypoperfusion with sepsis / anorexia / diuresis / hypotension- improving with IVF    2) Metabolic 01/13/2018   PGLU 71 01/13/2018       Imaging: All imaging studies reviewed.     Meds:     Current Facility-Administered Medications:  hydrocortisone (CORTEF) tab 25 mg 25 mg Oral BID   ipratropium-albuterol (DUONEB) nebulizer solution 3 mL 3 mL Nebulizati

## 2018-01-13 NOTE — PROGRESS NOTES
[facility]  Endocrinology Progress Note    Dylan Kimble Patient Status:  Inpatient    3/6/1955 MRN [id number]   [facility] 4SW-A Attending Randall Aschoff, MD   [address] Day # 15 PCP Dahiana Garcia DO     CC: hypoglycemia, hypotension    Sub has been on intermittent dexamethasone 20 mg for several days per month x years due to MM treatment   - am cortisol 16.1 on 1/10/18 and would expect it to be higher for pt in septic shock  - hypotension and hypoglycemia likely due in part to adrenal suppre

## 2018-01-13 NOTE — PROGRESS NOTES
[facility] HOSPITALIST  Progress Note     Carry Bending Patient Status:  Inpatient    3/6/1955 MRN [id number]   [facility] 4SW-A Attending Jaleel Damian MD   [facility] Day # 15 PCP Artur Almaraz DO     Chief Complaint: Septic shock    S: Patient doi possibly narrow abx? 3. Cultures were positive from [place]  2. Shock 2/2 sepsis/adrenal insuff  3. Acute encephalopathy 2/2 above, resolved  4. Acute Hypoxic Respiratory Failure, resolved extubated 1/4  5.  Recurrent Hypoglycemia 2/2 sepsis, poor oral

## 2018-01-13 NOTE — PROGRESS NOTES
Afebrile. No events or complaints. Blood surgar 80. Appetite poor. Ambulated to bathroom with steady gait. No complaints of pain,.

## 2018-01-14 NOTE — PROGRESS NOTES
[facility]  Nephrology Progress Note    Prachi Rodriguez Attending:  Jazmín Kelly MD       Assessment and Plan:    1) JENNIE- due to mild volume depletion / hypoperfusion with sepsis / anorexia / diuresis / hypotension- improving with IVF- continue x 24 01/14/2018   CO2 19.0 01/14/2018   GLU 56 01/14/2018   CA 7.6 01/14/2018   ALB 1.8 01/14/2018   ALKPHO 258 01/14/2018   BILT 0.8 01/14/2018   TP 5.8 01/14/2018   AST 41 01/14/2018   ALT 36 01/14/2018   MG 1.7 01/14/2018   PHOS 2.4 01/14/2018   PGLU 66 01/1 bedside.           Cesar Flynn  1/14/2018  957 AM

## 2018-01-14 NOTE — PROGRESS NOTES
Spoke with  Dr Lisa Varela about patient bloodsugars and the treatment that he has received so far. also about his refusal to eat any food here and is waiting for his wife to come after 1pm.she states not to hang d5 and that patient has to drink and eat to [facility]

## 2018-01-14 NOTE — PROGRESS NOTES
[facility]  Progress Note    Kerrie Marcano Patient Status:  Inpatient    3/6/1955 MRN [id number]   [facility] 4NW-A Attending Brayan Lucas MD   [facility] Day # 15 PCP Maria De Jesus Torres DO     Subjective:  Kerrie Marcano is a(n) 58year old ma 52.0*     Recent Labs   Lab  01/13/18   0600  01/14/18   0630  01/14/18   0800   GLU  61*  54*  56*   BUN  36*  28*  28*   CREATSERUM  2.78*  2.29*  2.24*   CA  7.6*  7.7*  7.6*   NA  143  144  144   K  3.3*  3.1*  3.0*   CL  119*  117*  117*   CO2  14.0*

## 2018-01-14 NOTE — PROGRESS NOTES
Blood sugar this am at 0800 60 patient denies any symptoms of hypoglycemia .  At that time he received juice and gram cracker he states that he cant tolerate any of our food and is not going to eat until his wife comes and that he could send her for some fo

## 2018-01-14 NOTE — PROGRESS NOTES
Heme/Onc Progress Note      Interim History:    Patient feels well. Has no specific complaints. Wants to go home.      Physical Examination:  BP 99/50 (BP Location: Left arm)   Pulse 71   Temp 97.6 °F (36.4 °C) (Oral)   Resp 16   Wt 64.3 kg (141 lb 11.2 oz) harish Pike

## 2018-01-14 NOTE — PROGRESS NOTES
[facility] HOSPITALIST  Progress Note     Garlon Galeazzi Patient Status:  Inpatient    3/6/1955 MRN [id number]   [facility] 4SW-A Attending Seda Mayes MD   [address] Day # 15 PCP Miki Amanda DO     Chief Complaint: Septic shock    S: Patient not ASSESSMENT / PLAN:     1. Septic Shock 2/2 Strep bacteremia/PNA, coronavirus  1. Off pressor, on midodrine/steroids  2. Meropenem completed  3. Cultures were positive from [facility]  2. Shock 2/2 sepsis/adrenal insuff  3.  Acute encephalopathy 2/2 a

## 2018-01-14 NOTE — PROGRESS NOTES
[facility]  Endocrinology Progress Note    Bib Harveyfoster Patient Status:  Inpatient    3/6/1955 MRN [id number]   [facility] 4SW-A Attending Lulu Gore MD   [address] Day # 15 PCP Miki Amanda, DO     CC: hypoglycemia, hypotension      S ALT 36 01/14/2018   MG 1.7 01/14/2018   PHOS 2.4 01/14/2018   PGLU 121 01/14/2018       A/P  58year old man with multiple myeloma admitted with septic shock, hypotension, hypoglycemia, adrenal insufficiency  1.  Adrenal insufficiency  - Pt has been on in

## 2018-01-15 NOTE — PROGRESS NOTES
Heme/Onc Progress Note      Interim History:    Patient feels well. Has no specific complaints. Wants to go home.      Physical Examination:  /69 (BP Location: Right arm)   Pulse 63   Temp 98.4 °F (36.9 °C) (Oral)   Resp 18   Wt 64.3 kg (141 lb 11.2 o AM   Result Value Ref Range   Potassium 3.4 (L) 3.6 - 5.1 mmol/L   -MAGNESIUM   Collection Time: 01/15/18  6:20 AM   Result Value Ref Range   Magnesium 2.2 1.7 - 3.0 mg/dL   -CBC W/ DIFFERENTIAL   Collection Time: 01/15/18  6:22 AM   Result Value Ref Range Program  Section of Hematology/Oncology, [facility]

## 2018-01-15 NOTE — PROGRESS NOTES
[facility] HOSPITALIST  Progress Note     Valiant Spittle Patient Status:  Inpatient    3/6/1955 MRN [id number]   [facility] 4SW-A Attending Bessy Gallegos MD   [facility] Day # 13 PCP Bekah Silvestre DO     Chief Complaint: Septic shock    S: Patient rep hydrocortisone  25 mg Oral BID   • Midodrine HCl  10 mg Oral TID   • acyclovir  400 mg Oral BID   • Pantoprazole Sodium  40 mg Oral QAM AC       ASSESSMENT / PLAN:     1. Septic Shock 2/2 Strep bacteremia/PNA, coronavirus, resolved  2.  Shock 2/2 sepsis/adr

## 2018-01-15 NOTE — CM/SW NOTE
BPCI:  Met with patient at bedside to explain the BPCI/Medicare program. Patient agreed with phone f/u for 3 months from 0 [facility] after discharge from [facility]. Patient was enrolled under . BPCI/Medicare Letter and Brochure provided.  Plan for lynda

## 2018-01-15 NOTE — PROGRESS NOTES
[facility]  Endocrinology Progress Note    Roro Hernandezgraciela Patient Status:  Inpatient    3/6/1955 MRN [id number]   [facility] 4SW-A Attending Alfonso Rodríguez MD   [facility] Day # 13 PCP Bekah Silvestre DO     CC: hypoglycemia, hypotension      S therefore pt with adrenal suppression due to chronic steroid use  - pt still with intermittent hypoglycemia due to poor po intake and encouraged pt at length to eat while in the hospital, on IVF per renal  - continue hydrocortisone 25mg BID for now  - of n

## 2018-01-15 NOTE — PROGRESS NOTES
[facility]  Progress Note    Jerson Loera Patient Status:  Inpatient    3/6/1955 MRN [id number]   [facility] 4NW-A Attending Boaz Yang MD   [facility] Day # 15 PCP Jareth Egan DO       Impression     1.  Pneumococcal sepsis with septi Hypoglycemia     Depression      Subjective:  Reina Hollingsworth is a(n) 58year old male.    no new complaisn    no dysopnea   Objective:  Oxygen Therapy  SpO2: 99 %  O2 Device: None (Room air)  FiO2 (%): 50 %  O2 Flow Rate (L/min): 0 L/min  Pulse Oximetry Typ (H) 06/21/2014   PT 15.3 (H) 05/31/2014   PT 14.9 (H) 05/14/2014   INR 1.27 (H) 01/03/2018   INR 1.51 (H) 01/02/2018   INR 1.90 (H) 01/01/2018        No results for input(s): TSH in the last 72 hours.     No results for input(s): ABGPHT, OUGNFM5O, QSJFZ9J, PEAK flow  PF Readings from Last 1 Encounters:  No data found for PF        Daviskrystal Hoshelby Mcdonnell  1/15/2018  4:29 PM

## 2018-01-16 NOTE — DISCHARGE SUMMARY
[facility] [place] HOSPITALIST  DISCHARGE SUMMARY     Bebo Jean Patient Status:  Inpatient    3/6/1955 MRN [id number]   [facility] 4NW-A Attending Lizet Richardson MD   [facility] Day # 12 PCP Jocy Osborn DO     Date of Admission: 2017  Date of Dis distress, placed on BiPAP without any significant improvement. Patient was noted to have significant amount of lactic acidosis, was fluid resuscitated and intubated. He required pressor support due to hypotension.   He was started on broad-spectrum IV ant hydrocortisone 20 MG Tabs  Commonly known as:  CORTEF      Take one 20mg tablet and one 5mg tablet for a total of 25mg twice daily with breakfast and dinner   Quantity:  180 tablet  Refills:  0     Megestrol Acetate 40 MG/ML Susp  Commonly known as:  GATITO MG Tbec  Commonly known as:  PROTONIX      TAKE 1 TABLET(40 MG) BY MOUTH EVERY MORNING BEFORE BREAKFAST   Quantity:  90 tablet  Refills:  0     sodium bicarbonate 650 MG Tabs      Take 1 tablet (650 mg total) by mouth 3 (three) times daily.    Quantity:  90

## 2018-01-16 NOTE — PLAN OF CARE
PICC line discontinued. Cather tip intact. No bleeding noted. Pressure dressing applied over PICC line site.

## 2018-01-16 NOTE — PHYSICAL THERAPY NOTE
PHYSICAL THERAPY TREATMENT NOTE - INPATIENT    Room Number: 410/410-A     Session:  3  Number of Visits to Meet Established Goals: 3 (additional 3 visits to start 1/9/18)    Presenting Problem: Septic Shock     Problem List  Active Problems:    Multiple m right    WEIGHT BEARING RESTRICTION  Weight Bearing Restriction: None                PAIN ASSESSMENT   Ratin          BALANCE                                                                                                                     Static Sit CGA.Pt ambulated 25ft with RW at superivon to room and repositioned in bed at independent. Pt educated on safety with stairs for return to home.  Pt educated that he requires assistance for all stair negotiation at this time d/t loss of balance and increa maura w/ supervision.  -PROGRESSING 1/16   Goal #5     Goal #6

## 2018-01-16 NOTE — PROGRESS NOTES
[facility] HOSPITALIST  Progress Note     Lainey Thomas Patient Status:  Inpatient    3/6/1955 MRN [id number]   [facility] 4SW-A Attending Heidi Langley MD   [address] Day # 12 PCP Sugey Syed DO     Chief Complaint: Septic shock    S: Patient rep Megestrol Acetate  800 mg Oral Daily   • hydrocortisone  25 mg Oral BID   • Midodrine HCl  10 mg Oral TID   • acyclovir  400 mg Oral BID   • Pantoprazole Sodium  40 mg Oral QAM AC       ASSESSMENT / PLAN:     1.  Septic Shock 2/2 Strep bacteremia/PNA, coron

## 2018-01-16 NOTE — PROGRESS NOTES
[facility]  Endocrinology Progress Note    Reather Edge Patient Status:  Inpatient    3/6/1955 MRN [id number]   [facility] 4SW-A Attending Kenna Silva MD   [address] Day # 12 PCP Trudy Sy DO     CC: hypoglycemia, hypotension      S A/P  58year old man with multiple myeloma admitted with septic shock, hypotension, hypoglycemia, adrenal insufficiency  1.  Adrenal insufficiency  - Pt has been on intermittent dexamethasone 20 mg for several days per month x years due to MM treatmen

## 2018-01-16 NOTE — PROGRESS NOTES
[facility]  Nephrology Progress Note    Jerson Loera Attending:  Boaz Yang MD       Subjective:  Awake alert no new complaints  Hypoglycemic yesterday  Eating better today    Current Facility-Administered Medications:  potassium chloride (K-SOL BMI 21.44 kg/m²   Temp (24hrs), Av °F (36.7 °C), Min:97.7 °F (36.5 °C), Max:98.6 °F (37 °C)       Intake/Output Summary (Last 24 hours) at 18 0915  Last data filed at 18 0745   Gross per 24 hour   Intake             2656 ml   Output time    D/C planning     Rhonda Harvey  1/16/2018

## 2018-01-16 NOTE — PROGRESS NOTES
Heme/Onc Progress Note      Interim History:    Patient feels well. Has no specific complaints. Awaiting discharge.      Physical Examination:  /58 (BP Location: Right arm)   Pulse 72   Temp 99.6 °F (37.6 °C) (Oral)   Resp 16   Wt 66 kg (145 lb 9.6 oz 17.0 g/dL   HCT 20.8 (L) 37.0 - 53.0 %   PLT 60.0 (L) 150.0 - 450.0 10(3)uL   MCV 92.0 80.0 - 99.0 fL   MCH 31.4 27.0 - 33.2 pg   MCHC 34.1 31.0 - 37.0 g/dL   RDW 20.9 (H) 11.5 - 16.0 %   RDW-SD 67.6 (H) 35.1 - 46.3 fL   Neutrophil Absolute Prelim 2.71 1.3

## 2018-01-17 NOTE — PROGRESS NOTES
Initial Post Discharge Follow Up   Discharge Date: 1/16/18  Contact Date: 1/17/2018    Consent Verification:  Assessment Completed With: Patient  HIPAA Verified?   Yes    Discharge Dx:    Septic shock secondary to strep bacteremia/PNA Luther brumfield MOUTH TWICE DAILY Disp: 60 tablet Rfl: 0   DUREZOL 0.05 % Ophthalmic Emulsion START ON 12/02/17 . PLACE 1 DROP INTO LEFT EYE once a day up to [facility] 1/3/18.  Disp:  Rfl: 1   PANTOPRAZOLE SODIUM 40 MG Oral Tab EC TAKE 1 TABLET(40 MG) BY MOUTH EVERY MORNING BEFO prescribed? No  Are you having any concerns with constipation? No    Referrals/orders at D/C:  Home Health ordered at D/C? Yes   Has HH been set up? Yes   If Yes: With Whom: [facility]    DME ordered at D/C? No, patient has a rolling walker with a seat, bath Jason 19  [phone]          PCP TCM/HFU appointment: scheduled at D/C within 7-14 days yes     NCM Reviewed/scheduled/rescheduled PCP TCM/HFU appointment with pt:  NCM confirmed TCM HFU on 1/23/18      Have you made all of yo

## 2018-01-18 NOTE — PROGRESS NOTES
Patient is here today for follow up with Evaristo Ortiz. Post hospital visit. Patient denies pain. Appetite is still poor. Fatigued. Medication list, medical history and toxicities were reviewed and updated.     Education Record    Learner:  Patient and Spouse

## 2018-01-18 NOTE — PROGRESS NOTES
Nephrology Progress Note      Rea Gu is a 58year old male.     HPI:   Patient presents with:  Chronic Kidney Disease  Hypokalemia       59 y/o with MM - currently on palliative tx with daratumumab, CKD - stage III (baseline Cr <2) who presents for Packs/day: 1.00      Years: 10.00        Quit date: 4/20/1992  Smokeless tobacco: Never Used                      Alcohol use:  No                 Medications (Active prior to today's visit):    Current Outpatient P Oral Tab Take 1 tablet (0.5 mg total) by mouth daily. (Patient taking differently: Take 0.5 mg by mouth daily.  Dose decreased to Monday, Wednesday and Friday ) Disp: 30 tablet Rfl: 11       Allergies:    Pcn [Penicillins]           Comment:Lost voice  Revl uL  2.71 2.91   Lymphocytes Absolute      0.90 - 4.00 x10(3) uL  0.94 0.89 (L)   Monocytes Absolute      0.10 - 0.60 x10(3) uL  0.27 0.25   Eosinophils Absolute      0.00 - 0.30 x10(3) uL  0.02 0.03   Basophils Absolute      0.00 - 0.10 x10(3) uL  0.01 0.0 labs closely ; f/U in 6-8 wks    Romayne Ponder, MD  1/18/2018

## 2018-01-22 NOTE — TELEPHONE ENCOUNTER
FYI- c/o burning when swallows; has had to use swish and swallow medicine in past.  Patient has no appetite. Seeing Dr Fabricio Martinez tomorrow. Yes, Dr Fabricio Martinez will sign paperwork for home health.

## 2018-01-23 NOTE — PROGRESS NOTES
HPI:    Will Marin is a 58year old male here today for hospital follow up.    He was discharged from Inpatient hospital, [facility] to Home   Admission Date: 12/31/17   Discharge Date: 1/16/18  Hospital Discharge Diagnoses (since 12/24/2017)   Non dinner   hydrocortisone 20 MG Oral Tab Take one 20mg tablet and one 5mg tablet for a total of 25mg twice daily with breakfast and dinner   ACYCLOVIR 400 MG Oral Tab TAKE 1 TABLET BY MOUTH TWICE DAILY   PANTOPRAZOLE SODIUM 40 MG Oral Tab EC TAKE 1 TABLET(40 smoking about 25 years ago. He has a 10.00 pack-year smoking history. He has never used smokeless tobacco. He reports that he does not drink alcohol or use drugs. ROS:   Review of Systems    PHYSICAL EXAM:   No LMP for male patient.  Estimated body mass

## 2018-01-23 NOTE — PROGRESS NOTES
Progress Note Details  Patient Name: Umair Rivas Date: 1/22/2018   Patient Number: [id number] Physician / Jeremie Posada: Dave Schmitt   Patient YOB: 1955 Facility: [facility]    Chief Complaint  This information was obtained fro Former smoker - quit 4/20/92 1 pk/day x 10 yrs, Marital Status - , Alcohol Use - rarely , Tobacco Use - no, Caffeine Use - rarely, Lives with - wife    Past Medical History  This information was obtained from the patient, chart  Patient has a medica Medication reconciliation completed at today's visit. : Yes  History of chemotherapy?: Yes  Date and Type[de-identified] 2018  History of radiation?: No    Medications  midodrine 5 mg tablet oral tablet oral  sodium bicarbonate 650 mg tablet oral tablet oral  acyclovir Gastrointestinal (GI):  Soft, non-distended, non-tender, normoactive bowel sounds. .     Lymphatic:  No axillary lymphadenopathy. Integumentary (Hair, Skin)  Firm wound bed ,no subcutaneous nodules, induration or crepitus. .     Wound #4 Left, Distal, An Wound #6 Right, Proximal Upper Arm is an acute Partial Thickness Trauma Wound and has received a status of Not Healed. Subsequent wound encounter measurements are 2.6cm length x 5.5cm width with no measurable depth, with an area of 14.3 sq cm .  No tunnelin (Encounter Diagnosis) S41.111A - Laceration without foreign body of right upper arm, initial encounter  (Encounter Diagnosis) E12.384U - Laceration without foreign body of left forearm, initial encounter  (Encounter Diagnosis) T08.520R - Laceration without Wound #6 (Trauma Wound) is located on the right, proximal upper arm. A selective debridement with a total area debrided of 14.3 sq cm was performed by Aneesh Simpson NP. to remove devitalized tissue: fibrin.  The following instrument(s) were used: forc 4% Topical Lidocaine  Dermaplast Spray    Wound Cleansing & Dressings  May shower with protection.   Cleanse with saline or wound cleanser  Non-Adherent to wound bed - Silver restore to wound bed  Thick Foam  Other: - Surgilast  Change dressing every: - Fri Casey PAIZ, PAYTONP-BC 01/22/2018 18:33:22   Entered By: Casey Wang on 01/22/2018 18:32:40

## 2018-01-25 NOTE — TELEPHONE ENCOUNTER
Please call him. Is he feeling stronger. Is he eating more? His myeloma numbers from last week were a little worse. I want him to get labs locally today (order is in Epic). Patient not eating much. Still weak. Will see APN today.  Will come in p

## 2018-01-25 NOTE — PROGRESS NOTES
Per Silvia, plan is to give 1L of fluids with 2g of mag over 1hr. Post H&H and TS to be drawn. Possibly bring patient back for a blood transfusion tomorrow. We will add the patient tentatively and cancel if need be.

## 2018-01-26 NOTE — PROGRESS NOTES
Pt meeting definition for severe malnutrition in acute illness as evidenced by 12% unplanned wt loss x 4 wks and po intake meeting less than 50% estimated nutrition needs.      Nutrition Consultation    Patient Name: Rut Galvan  YOB: 1955 assess po intake.  Pt ate 100% of first meal.   Pt is on room air.       58year old male with past medical history of multiple myeloma last received palliative chemo on 12/28 transferred here from outside hospital with acute respiratory distress.  Pt is in mouth 3 (three) times daily. , Disp: 90 tablet, Rfl: 5  •  ALLOPURINOL 100 MG Oral Tab, TAKE 1 TABLET(100 MG) BY MOUTH DAILY, Disp: 90 tablet, Rfl: 0  •  Epoetin Alex (EPOGEN IJ), Inject as directed.  epogen procrit - once a week injection, Disp: , Rfl:   • urination nocturnally and then needing to coordinate pump and walker, etc. Pt stated he would prefer daytime bolus feeds. RD discussed nutrition needs w/ oncologist.      Pending MD discretion, suggest:     1.  Initiate bolus enteral nutrition via PEG: DAR I

## 2018-01-26 NOTE — TELEPHONE ENCOUNTER
Spoke to pt wife to arrange apt's at [address] [place] [address] for possible G-tube placement. They will then come to cancer center for possible blood transfusion. Pt spouse verbalized plan.

## 2018-01-27 NOTE — OR NURSING
Called Anesthesia, x [phone] spoke with Dr. Jennifer Arredondo. Informed doctor age, procedure, diagnosis, medical history; abnormal cmp & cbc, received 1 UPRBC & fluids today.  Order for cbc & cmp on admit

## 2018-01-29 NOTE — OPERATIVE REPORT
Rut Galvan Patient Status:  Hospital Outpatient Surgery    3/6/1955 MRN [id number]   [facility] ENDOSCOPY Attending Severo Posey, MD   Hosp Day # 0 PCP Adali Mathews DO     PREOPERATIVE DIAGNOSIS/INDICATION: Adult failure to Celester Gammons percutaneous gastrostomy tube, "[employer] - The Simple, Fast and Elegant Tablet Sales App" 20 Fr externally removable with a soft internal bolster was introduced with the Pull method, placement was confirmed endoscopically, a small incision was perform on the skin with a scalp to allow passage of

## 2018-01-29 NOTE — PROGRESS NOTES
[facility] Progress Note      Patient Name: Sidra Solis   YOB: 1955  Medical Record Number: [id number]  Attending Physician: Bibiana Chavez M.D.      Date of Visit: 1/18/2018      Chief Complaint  Multiple myeloma, light chain la maintenance lenolidomide 5 mg daily x 21 days every 28 days. Immediately liver function increased but initially mildly. However, they then increased significantly. Hepatitis B viral load was checked and found to be lower than before lenolidomide.  As a resu Able to care for most needs, requires occasional help. Past Medical History (historical data, reviewed)  Asthma; GERD; duodenal ulcer. Past Surgical History (historical data, reviewed)  Surgical correction of duodenal ulcer.     Family History (histor [lenalidomide]; tetracycline; and vancomycin. Review of Systems   Constitutional Fatigue. Respiratory No dyspnea, cough, hemoptysis, pleuritic chest pain. Musculoskeletal Chronic mild back pain.    Neurologic Stable mild peripheral neuropathy involvi tube must be considered to provide adequate nutrients. Planned Follow Up   Patient will return next week for labs. He will call us with an update. Risk Level: High - multiple myeloma. Electronically signed by:    VICKY Huffman

## 2018-01-29 NOTE — ANESTHESIA PREPROCEDURE EVALUATION
PRE-OP EVALUATION    Patient Name: Thor Matute    Pre-op Diagnosis: Weight loss [R63.4]  Failure to thrive in adult [R62.7]  Protein-calorie malnutrition, unspecified severity ([facility] 75.) [E46]    Procedure(s):  ESOPHAGOGASTRODUODENOSCOPY (EGD) WITH PERCUTANE taking differently: Take 0.5 mg by mouth. Dose decreased to Monday, Wednesday and Friday ) Disp: 30 tablet Rfl: 11   Lysine 1000 MG Oral Tab Take 1 tablet by mouth daily.  Disp:  Rfl:    Multiple Vitamins-Minerals (TAB-A-YURIDIA MAXIMUM) Oral Tab Take 1 tablet Available pre-op labs reviewed.     Lab Results  Component Value Date   WBC 7.9 01/25/2018   RBC 2.61 (L) 01/25/2018   HGB 7.5 (L) 01/25/2018   HCT 24.3 (L) 01/25/2018   MCV 93.1 01/25/2018   MCH 31.4 01/25/2018   MCHC 33.7 01/25/2018   RDW 21.5 (H) 01/

## 2018-01-29 NOTE — ANESTHESIA POSTPROCEDURE EVALUATION
[address] Patient Status:  Hospital Outpatient Surgery   Age/Gender 58year old male MRN [id number]   Location [facility]. Attending Mukul Chamorro, [address] [facility] [address] Day # 0 PCP Ty Montes DO       Anesthesia Post-op Note

## 2018-01-30 NOTE — TELEPHONE ENCOUNTER
Faxed orders to [facility] care recommendations from [facility] bolus enteral nutrition via PEG: FS Isosource 1.5 (or comparable) to be given via gravity syringe 2 cans at breakfast, lunch and 1 can at dinner.  Each bolus should be given over 30 min

## 2018-01-30 NOTE — PROGRESS NOTES
Faxed order and supporting documentation for nutrition supplies and education to [facility], [phone] (P: [phone]).

## 2018-01-30 NOTE — H&P
[address] Patient Status:  Hospital Outpatient Surgery    3/6/1955 MRN [id number]   [facility] ENDOSCOPY Attending No att. providers found   Hosp Day # 0 PCP Bekah Silvestre DO     CC: Adult failure to t Fever, Other (See Comments)    Comment:Generalized slough skin. Medications:  No current facility-administered medications for this encounter.      Physical Exam:    Blood pressure 144/84, pulse 71, temperature 98.4 °F (36.9 °C), temperature s

## 2018-01-31 NOTE — PROGRESS NOTES
Faxed application for Patient Assistance Program for Nutren 1.5 to [address] [place] [address], [phone].

## 2018-02-01 NOTE — TELEPHONE ENCOUNTER
PLEASE FAX TO DR Jhoana Lim-  THE ORIGINAL ORDERED VISIT FREQUENCY FOR SKILLED NURSING OF 2 TIMES WEEK OF 1/29/2018 HAS BEEN CHANGED TO 1 TIME DUE TO : RN ILLNESS.     PLEASE CONTACT [facility] HOME [facility]ARE [facility] VIA PHONE IF YOU FEEL THIS VISIT MUST OCCUR REGARDLESS

## 2018-02-02 NOTE — PROGRESS NOTES
02/02/18 1741   Clinical Encounter Type   Visited With Health care provider   Routine Visit (Request for consult - Advance Directive)   Patient's Supportive Strategies/Resources Consulted with RN who advised that pt. not appropriate for visit.  No family

## 2018-02-02 NOTE — CONSULTS
[facility] Report of Consultation      Patient Name: Naheed Love   YOB: 1955  Medical Record Number: [id number]  Consulting Physician: Maria Del Carmen George M.D.    Referring Physician: Gio Pratt DO    Date of Consultat Constitutional Fatigue and weight loss. No fevers or chills. Allergic/Immunologic No reactions. Eyes No significant visual changes. ENMT No oral pain, sore throat, epistaxis, hearing changes. Endocrine No hot flashes; no diabetes, thyroid disease. Time: 02/02/18 12:12 PM   Result Value Ref Range   Glucose 109 (H) 70 - 99 mg/dL   BUN 54 (H) 8 - 20 mg/dL   Creatinine 4.18 (H) 0.70 - 1.30 mg/dL   GFR 14 (L) >=60   Calcium, Total 12.6 (H) 8.3 - 10.3 mg/dL   Alkaline Phosphatase 133 (H) 45 - 117 U/L   AS Value Ref Range   Slide Review Slide reviewed,morphology review added    -MAGNESIUM   Collection Time: 02/02/18 12:12 PM   Result Value Ref Range   Magnesium 2.4 1.7 - 3.0 mg/dL   -ANTIBODY IDENTIFICATION   Collection Time: 02/02/18 12:12 PM   Result Value Otherwise, hospice is advised. 2.   Hypotension: Patient appears dehydrated so fluids started. At last visit, hydrocortisone and midodrine were started to help with blood pressure.  Will give stress dose steroids acutely but if these do not help, they c

## 2018-02-02 NOTE — PROGRESS NOTES
Education Record    Learner:  Patient, Spouse and Family Member    Disease / Diagnosis:Hypotension/ IV fluids/ Blood culture x 2 , Urine culture +UA    Barriers / Limitations:  None   Comments:    Method:  Brief focused   Comments:    General Topics:  Infe

## 2018-02-02 NOTE — PROGRESS NOTES
ANP Visit Note    Patient Name: Maryann Harden   YOB: 1955   Medical Record Number: [id number]   CSN: [id number]   Date of visit: 2/2/2018     Chief Complaint/Reason for Visit:  Follow up Multiple/Myeloma / Failure to Thrive    Oncologic Histor Immediately liver function increased but initially mildly. However, they then increased significantly. Hepatitis B viral load was checked and found to be lower than before lenolidomide. As a result it was felt that lenolidomide was the cause.  It was discon recovering from recent sepsis. Patient continues to progressively become weaker despite initiating tube feedings. He has been up to 5 cans a day with water flushes. He has only had a small amount of diarrhea. Wife states he sleeps most of the day.  He anahi Stomach ulcer    • Uncomfortable fullness after meals    • Visual impairment     reading   • Wears glasses    • Weight loss        Surgical History:  Past Surgical History:  1/14/08: COLONOSCOPY      Comment: normal  7/1/14: IR PORT A CATH PROCEDURE      C Chloride ER 20 MEQ Oral Tab CR, Take 1 tablet (20 mEq total) by mouth daily. , Disp: 30 tablet, Rfl: 6  •  Midodrine HCl 5 MG Oral Tab, Take 1 tablet (5 mg total) by mouth 3 (three) times daily.  Future refills per nephrologist, Disp: 90 tablet, Rfl: 0  •  V Review of Systems:  A comprehensive 14 point review of systems was completed. Pertinent positives and negatives noted in the the HPI.     Performance Status: ECOG 3     Physical Examination:  General: Patient is alert and oriented x 3, weak appearing, needs to improve in order to start any acute therapy. Will admit to rehydrate, and try to improve performance status and rule out infection. Cultures drawn, was unable to obtain a urine culture here in clinic. Lactic acid sent.      2.    Prophylaxis: Cont

## 2018-02-02 NOTE — TELEPHONE ENCOUNTER
Return call to patient wife. Patient to come in today for APN visit. For labs and evaluation.   Appointment scheduled for 12pm

## 2018-02-02 NOTE — PLAN OF CARE
NURSING ADMISSION NOTE      Patient admitted via Wheelchair  Oriented to room. Safety precautions initiated. Bed in low position. Call light in reach. Admitted form the cancer centre for hypotension and weakness.  Pt is rachid

## 2018-02-02 NOTE — H&P
[facility]  History and Physical     Clinch Wick Patient Status:  Inpatient    3/6/1955 MRN [id number]   [facility] 4NW-A Attending Mario Albarran DO   Hosp Day # 0 PCP Celia Almeida DO     Chief Complaint: hypotension     Histo transplant  1/14/08: COLONOSCOPY      Comment: normal  01/29/2018: FEEDING TUBE CARE FOR PEG  7/1/14: IR PORT A CATH PROCEDURE      Comment: double lumen power port placed  11/2014: OTHER      Comment: stem cell transplant   1973: OTHER SURGICAL HISTORY Oral Tab Take one 20mg tablet and one 5mg tablet for a total of 25mg twice daily with breakfast and dinner Disp: 180 tablet Rfl: 0   PANTOPRAZOLE SODIUM 40 MG Oral Tab EC TAKE 1 TABLET(40 MG) BY MOUTH EVERY MORNING BEFORE BREAKFAST Disp: 90 tablet Rfl: 0 bilaterally. No rhonchi. Cardiovascular: S1, S2. Regular rhythm. Regular rate. No murmurs, rubs or gallops. Equal pulses. Chest and Back: No tenderness or deformity. Abdomen: Soft, nontender, nondistended. Positive bowel sounds.  No rebound or guarding

## 2018-02-03 NOTE — OCCUPATIONAL THERAPY NOTE
Attempted to see pt for OT eval.  Pt hgb 6.8 today. Will hold per rehab protocol.  OT to f/u tomorrow

## 2018-02-03 NOTE — PROGRESS NOTES
Called by RN d/t bleeding from g-tube site  Hemodynamics have been okay  H&H drop noted    Plan;  Vitals to be repeated right now  PRBC to be transfused today  Stop Heparin  Will ask for GI evaluation  Thuy FULTON

## 2018-02-03 NOTE — CONSULTS
[facility]  Report of Consultation    Reina Hollingsworth Patient Status:  Inpatient    3/6/1955 MRN [id number]   [facility] 4NW-A Attending Leonard Mckeon MD   Hosp Day # 0 PCP Dariel Bojorquez DO     Reason for Consultation:  JENNIE/CKD      Hi duodenum - surgery- \"clips\" per pt  01/29/2018: TUBE INSERTION N/A      Comment: EGD with PEG tube placement  1/14/08: UPPER GI ENDOSCOPY - REFERRAL      Comment: + H. Pylori / duodenitis  4-14-97: VASECTOMY  Family History   Problem Relation Age of Onset cachectic  HEENT: dry mucous membranes. EOM-I. PERRL  Neck: No lymphadenopathy. No JVD. No carotid bruits. Respiratory: Clear to auscultation bilaterally. No wheezes. No rhonchi. Cardiovascular: S1, S2.  Regular rate and rhythm. No murmurs.  Equal puls

## 2018-02-03 NOTE — PLAN OF CARE
CARDIOVASCULAR - ADULT    • Maintains optimal cardiac output and hemodynamic stability Progressing        HEMATOLOGIC - ADULT    • Maintains hematologic stability Progressing        METABOLIC/FLUID AND ELECTROLYTES - ADULT    • Hemodynamic stability and op

## 2018-02-03 NOTE — PHYSICAL THERAPY NOTE
Pt is attempted for in patient physical therapy evaluation and is on hold due to Hb 6.8. Pt will be re-attempted once medically stable.

## 2018-02-03 NOTE — PROGRESS NOTES
[facility]  Progress Note     Dylan Kimble Patient Status:  Inpatient    3/6/1955 MRN [id number]   [facility] 4NW-A Attending Naseem Marroquin MD   Hosp Day # 1 PCP Dahiana Garcia DO     Chief Complaint: JENNIE, Anemia    S: Patient sta rate  2. Acute kidney injury on chronic kidney disease  3. Hypercalcemia sp Calcitonin, improving  1. IVF  2. Monitor UOP, creatinine and electrolytes  3. Nephrology following  4. Multiple myeloma  5. Anemia, multifactorial  6. Thrombocytopenia  1.  PRBC to

## 2018-02-03 NOTE — DIETARY MALNUTRITION NOTE
[facility]    NUTRITION INITIAL ASSESSMENT    Pt meets severe malnutrition criteria.     CRITERIA FOR MALNUTRITION DIAGNOSIS:  Criteria for severe malnutrition diagnosis: chronic illness related to energy intake less than 75% for greater than 1 month, Yes    NUTRITION RELATED PHYSICAL FINDINGS:     1. Body Fat/Muscle Mass: severe depletion body fat orbital region and severe depletion muscle mass temple region and clavicle region per visual exam.     2. Fluid Accumulation: none noted per visual exam.

## 2018-02-03 NOTE — PLAN OF CARE
Assumed care @0835. Patient refused to eat breakfast and lunch. GT site with moderate sanguinous drainage, Dr. Mikel Santos notified, Dr. Bonita Bush notified regarding consult, DR. Pacheco notified. Patient denies abdominal pain.  Patient's abdomen soft, non-dist

## 2018-02-03 NOTE — CONSULTS
[facility]  Report of Consultation    Abdelrahman Langston Patient Status:  Inpatient    3/6/1955 MRN [id number]   [facility] 4NW-A Attending Ryann Anglin MD   Hosp Day # 1 PCP Adali Andre DO     Reason for Consultation:  Bleeding at G t Ulcer - duodenum - surgery- \"clips\" per pt  01/29/2018: TUBE INSERTION N/A      Comment: EGD with PEG tube placement  1/14/08: UPPER GI ENDOSCOPY - REFERRAL      Comment: + H. Pylori / duodenitis  4-14-97: VASECTOMY  Family History   Problem Relation Age lips  Respiratory: Normal auscultation and effort  Abdomen: Normal liver,spleen, negative tenderness or masses, G tube in place with oozing at the site  Musculoskeletal: Normal gait, strength/tone  Neurologic: Normal cranial nerves and sensation  Laborator if low  Follow H/H   Transfuse PRBC PRN  Ok to resume tube feeds    Viviane Santiago  2/3/2018  2:56 PM

## 2018-02-03 NOTE — PROGRESS NOTES
[facility]  Progress Note    Elvira Guthrie Patient Status:  Inpatient    3/6/1955 MRN [id number]   [facility] 4NW-A Attending Chrystal Oppenheim, MD   Hosp Day # 1 PCP Elidia Gupta, DO     Feels tired; denies any acute changes overnight 23.9    Imaging:  Reviewed    Impression:  1. JENNIE on CKD- basline Cr ~ 2 with ongoing progression due to worsening underlying myeloma. He appears clinically volume depleted. Hypercalcemia also contributing to his JENNIE via renal vasoconstriction.   - continue

## 2018-02-03 NOTE — PROGRESS NOTES
[facility]    Progress Note    Garlon Galeazzi Patient Status:  Inpatient    3/6/1955 MRN [id number]   [facility] 4NW-A Attending Prudencio Buerger, MD   [facility] Day # 1 PCP Miki Later, DO     Subjective:  Garlon Galeazzi is a(n) 58 year ol myeloma (HCC)     GERD without esophagitis     Anemia in stage 4 chronic kidney disease (HCC)     CKD (chronic kidney disease) stage 4, GFR 15-29 ml/min (HCC)     Myeloma kidney (HCC)     CKD (chronic kidney disease) stage 3, GFR 30-59 ml/min     JENNIE (acut

## 2018-02-04 NOTE — PHYSICAL THERAPY NOTE
PHYSICAL THERAPY EVALUATION - INPATIENT     Room Number: 413/413-A  Evaluation Date: 2/4/2018  Type of Evaluation: Initial  Physician Order: PT Eval and Treat    Presenting Problem: hypotension  Reason for Therapy: Mobility Dysfunction and Discharge Pl GI ENDOSCOPY - REFERRAL      Comment: + H. Pylori / duodenitis  4-14-97: VASECTOMY    HOME SITUATION  Type of Home: House   Home Layout: Multi-level  Stairs to Enter : 6  Railing: Yes  Stairs to Bedroom: 5  Railing: Yes    Lives With: Spouse  Drives: No  Pa to a chair (including a wheelchair)?: A Little   -   Need to walk in hospital room?: A Little   -   Climbing 3-5 steps with a railing?: A Lot       AM-PAC Score:  Raw Score: 19   PT Approx Degree of Impairment Score: 41.77%   Standardized Score (AM-PAC Sca The patient is below baseline and would benefit from skilled inpatient PT to address the above deficits to assist patient in returning to prior to level of function.   DISCHARGE RECOMMENDATIONS  PT Discharge Recommendations: Home with home health PT    QUINCY

## 2018-02-04 NOTE — PROGRESS NOTES
02/04/18 1408   Clinical Encounter Type   Visited With Patient   Routine Visit (Responded to consult for advance directive)   Continue Visiting No   Patient's Supportive Strategies/Resources  offered emotional support including active listening

## 2018-02-04 NOTE — PLAN OF CARE
Maintains optimal cardiac output and hemodynamic stability Progressing      Maintains hematologic stability Progressing      Electrolytes maintained within normal limits Progressing      Hemodynamic stability and optimal renal function maintained Progressi

## 2018-02-04 NOTE — PROGRESS NOTES
[facility]  Progress Note    Rut Galvan Patient Status:  Inpatient    3/6/1955 MRN [id number]   [facility] 4NW-A Attending Tircia Juan MD   [facility] Day # 2 PCP Adali Mathews DO     Subjective:  Rut Galvan is a(n) 58year old Pancytopenia ([facility]     Hypogammaglobulinemia, acquired ([facility]     Metabolic acidosis     Hypotension     Hypoadrenalism (HCC)     Hypoglycemia     Depression     Acidosis     Lactic acidosis     Stage 4 chronic kidney disease ([facility]     Multiple myeloma not

## 2018-02-04 NOTE — PROGRESS NOTES
[facility]  Nephrology Progress Note    Abdelrahman Langston Attending:  Ryann Anglin MD       Assessment and Plan:    1) JENNIE- due to volume depletion, progressive myeloma, and hypercalcemia (nephrogenic DI + renal vascoconstriction)- improving with IVF imaging studies reviewed.     Meds:     Current Facility-Administered Medications:  0.9%  NaCl infusion  Intravenous Once   Midodrine HCl (PROAMATINE) tab 5 mg 5 mg Oral TID   Venlafaxine HCl (EFFEXOR) tab 37.5 mg 37.5 mg Oral TID   ondansetron HCl (ZOFRAN)

## 2018-02-04 NOTE — PROGRESS NOTES
[facility]    Progress Note    Wilbert Crespo Patient Status:  Inpatient    3/6/1955 MRN [id number]   [facility] 4NW-A Attending Omar Chase MD   [facility] Day # 2 PCP Chilango Driscoll DO     Subjective:  Wilbert Crespo is a(n) 58 year ol ml/min (HCC)     Myeloma kidney (HCC)     CKD (chronic kidney disease) stage 3, GFR 30-59 ml/min     JENNIE (acute kidney injury) ([address]     Septic shock (HCC)     Thrombocytopenia (HCC)     Anemia in neoplastic disease     Pancytopenia ([address]     Hypogammaglob

## 2018-02-05 NOTE — CM/SW NOTE
02/05/18 1300   CM/SW Referral Data   Referral Source Social Work (self-referral)   Reason for Referral Discharge planning   Informant Patient   Readmission Assessment   Pt's level of independence at discharge?  No assist/independent (minimal)   Pt's anastasia

## 2018-02-05 NOTE — PROGRESS NOTES
[facility]  Progress Note     Kings Guthrie Patient Status:  Inpatient    3/6/1955 MRN [id number]   [facility] 4NW-A Attending Salvador Desouza MD   [address] Day # 3 PCP Aniket Wright DO     Chief Complaint: JENNIE, Anemia    S: Patient Orren Alba Epic.    Medications:   • Midodrine HCl  5 mg Oral TID   • Venlafaxine HCl  37.5 mg Oral TID       ASSESSMENT / PLAN:     1. Hypotension d/t hypovolemia, cortisol elevated, resolved  1. IVF   2. Midodrine with parameters  3.  Monitor blood pressure and hear

## 2018-02-05 NOTE — PROGRESS NOTES
[facility] Progress Note      Patient Name: Colleen Park   YOB: 1955  Medical Record Number: [id number]  Attending Physician: Reza Resendez M.D.       SUBJECTIVE:  Thor Matute is a(n) 58year old male with relapse Result Value Ref Range   Phosphorus 2.8 2.5 - 4.9 mg/dL   -CBC W/ DIFFERENTIAL   Collection Time: 02/05/18  6:16 AM   Result Value Ref Range   WBC 2.9 (L) 4.0 - 13.0 x10(3) uL   RBC 2.20 (L) 4.30 - 5.70 x10(6)uL   HGB 7.0 (L) 13.0 - 17.0 g/dL   HCT 21.1 of significant prolonged benefit I would estimate is lower than the risks of major complications from chemotherapy. However, if patient wishes to pursue, I would recommend starting this week as his performance status is not likely to improve significantly.

## 2018-02-05 NOTE — PLAN OF CARE
GASTROINTESTINAL - ADULT    • Maintains adequate nutritional intake (undernourished) Progressing        NEUROLOGICAL - ADULT    • Achieves stable or improved neurological status Progressing        SKIN/TISSUE INTEGRITY - ADULT    • Incision(s), wounds(s) o

## 2018-02-05 NOTE — OCCUPATIONAL THERAPY NOTE
Attempted to see pt for skilled OT services this date. Pt is currently visiting with family/friends and refusing. Will re-attempt 2/6/18 as schedule allows.  Romana Suazo, 02/05/18, 2:34 PM

## 2018-02-05 NOTE — PROGRESS NOTES
[facility]  Progress Note    Jerson Loera Patient Status:  Inpatient    3/6/1955 MRN [id number]   [facility] 4NW-A Attending James Luna MD   [facility] Day # 3 PCP Jareth Egan, DO     Feels better; tolerating TFs  Denies f/c  No n/v 02/03/18  5722 02/03/18  1318 02/03/18  1921 02/04/18  0613 02/05/18  0616   WBC 7.7 5.4  --  5.4 4.6 2.9*   HGB 8.5* 6.8*  --  8.1* 7.7* 7.0*   MCV 96.3 96.3  --  95.7 95.5 95.9   PLT 74.0* 52.0*  --  50.0* 42.0* 57.0*   INR  --   --  1.22*  --   --   --

## 2018-02-05 NOTE — PHYSICAL THERAPY NOTE
Attempted to see pt in afternoon. Pt and wife refusing, with visitors present. D/w nsg, who stated family is considering hospice care.

## 2018-02-05 NOTE — PLAN OF CARE
Maintains optimal cardiac output and hemodynamic stability Progressing        Maintains adequate nutritional intake (undernourished) Progressing     Pt at goal rate for tube feeding 47cc/hr. Highest residual was around 80cc. Pt tolerating feeding well.  No

## 2018-02-05 NOTE — CDS QUERY
Clinical Significance – Hematology Results  Augusto Paul  Dear Doctor:  Clinical information (provided below) includes documentation of hematology results that are less than the normal range.  For accurate ICD-10-CM code assignm

## 2018-02-05 NOTE — PROGRESS NOTES
Gastroenterology Progress Note  Patient Name: Abdelrahman Langston  Chief Complaint: bleeding around G tube site  S: Pt denies further bleeding around G tube site. He denies abdominal pain.    O: /63 (BP Location: Left arm)   Pulse 85   Temp 97.8 °F (36.6 °

## 2018-02-05 NOTE — PROGRESS NOTES
Oncology Note:  Met with pt and wife who expressed wish to pursue hospice care. He is not interested in further cancer therapy and wife and pt both agree that comfort care is what they seek. Will consult SW. Also pt and wife express wish for DNR.   Pt un

## 2018-02-06 NOTE — PLAN OF CARE
CARDIOVASCULAR - ADULT    • Maintains optimal cardiac output and hemodynamic stability Progressing        GASTROINTESTINAL - ADULT    • Maintains adequate nutritional intake (undernourished) Progressing        SKIN/TISSUE INTEGRITY - ADULT    • Skin integr

## 2018-02-06 NOTE — OCCUPATIONAL THERAPY NOTE
Per chart, pt with plans to pursue hospice care. Will dc as acute OT is not consistent with end of life care. Please reconsult if POC changes.

## 2018-02-06 NOTE — PHYSICAL THERAPY NOTE
Per chart, pt with plans to pursue hospice care. Will dc as acute PT is not consistent with end of life care. Please reconsult if POC changes.

## 2018-02-06 NOTE — PROGRESS NOTES
[facility] Progress Note      Patient Name: Olga Spicer   YOB: 1955  Medical Record Number: [id number]  Attending Physician: Jadon Guthrie M.D.       SUBJECTIVE:  Wilbert Crespo is a(n) 58year old male with relapse 11.5 - 16.0 %   RDW-SD 65.3 (H) 35.1 - 46.3 fL   Neutrophil Absolute Prelim 1.80 1.30 - 6.70 x10 (3) uL   -MANUAL DIFFERENTIAL   Collection Time: 02/06/18  6:51 AM   Result Value Ref Range   Neutrophil Absolute Manual 1.91 1.30 - 6.70 x10(3) uL   Lymphocyt have decided to pursue hospice which is most appropriate. Will have social work see patient today and arrange for evaluation. Wife is interested in inpatient hospice options so hospice program should have such an option. Discontinue blood draws.      2.   N

## 2018-02-06 NOTE — CM/SW NOTE
SW received an order for hospice. SW met w/pt and pt's wife to discuss options. Pt's wife stated she would prefer the pt to dc to an inpatient unit. Informed her the pt may not meet criteria for that. Pt lives in [place], [place].  Informed pt's wife the closes

## 2018-02-06 NOTE — CM/SW NOTE
SW met w/pt and pt's wife for about 45 minutes to discuss dc plan. [facility] stated the pt does not meet criteria for the inpatient unit. Wife requesting to check if the pt would meet criteria for [facility].  CHRIS sent referral. Informed her the pt appears to be \"s

## 2018-02-06 NOTE — PROGRESS NOTES
[facility]  Progress Note     Pedro Benton Patient Status:  Inpatient    3/6/1955 MRN [id number]   [facility] 4NW-A Attending Maday Rojas MD   [address] Day # 4 PCP Justin Ruelas DO     Chief Complaint: JENNIE, Anemia    S: Patient Reese Alpha kidney disease, improving  3. Hypercalcemia sp Calcitonin, improving  4. Multiple myeloma  5. Anemia, multifactorial sp PRBC  6. Thrombocytopenia  7. Bleeding from PEG site, improving  8. Lactic acidosis, not sepsis  9.  Failure to thrive sp PEG      Goals

## 2018-02-06 NOTE — PROGRESS NOTES
[facility]  Nephrology Progress Note    Bolivar Vargas Attending:  Peyton Pavon MD       Assessment and Plan:    1) JENNIE- due to volume depletion, progressive myeloma, and hypercalcemia (nephrogenic DI + renal vascoconstriction)- improved with IVF / 108 02/06/2018   BILT 0.3 02/06/2018   TP 6.8 02/06/2018   AST 14 02/06/2018   ALT 16 02/06/2018       Imaging: All imaging studies reviewed.     Meds:     Current Facility-Administered Medications:  dextrose 5 %-0.45 % NaCl infusion  Intravenous Continuou

## 2018-02-07 NOTE — CM/SW NOTE
SW met w/pt. Pt's wife not at bedside. Not sure when she will arrive at the hospital. SW left a message for pt's wife. Awaiting a call back.

## 2018-02-07 NOTE — PROGRESS NOTES
[facility] Progress Note      Patient Name: Gwendolyn Gaucher   YOB: 1955  Medical Record Number: [id number]      SUBJECTIVE:  Edel Fournier is a(n) 58year old male with relapsed myeloma, reports increased fatigue.      OBJE Count Latest Ref Range: 150.0 - 450.0 10(3)uL 54.0 (L)   RBC Latest Ref Range: 4.30 - 5.70 x10(6)uL 2.10 (L)   MCH Latest Ref Range: 27.0 - 33.2 pg 31.4   MCHC Latest Ref Range: 31.0 - 37.0 g/dL 33.2   MCV Latest Ref Range: 80.0 - 99.0 fL 94.8   RDW Latest (TYLENOL) tab 650 mg 650 mg Oral Q6H PRN   LORazepam (ATIVAN) tab 1 mg 1 mg Oral Q4H PRN   Or      LORazepam (ATIVAN) injection 1 mg 1 mg Intravenous Q4H PRN   [COMPLETED] Calcitonin ([place]) (MIACALCIN) injection 200 Units 200 Units Subcutaneous Once   [facility]

## 2018-02-07 NOTE — CM/SW NOTE
CHRIS spoke w/Amada from [facility] [phone] regarding pt's situations. Sent updated notes/information to [facility]. Govind Jain stated she reviewed Silvia's note and will now be able to accept the pt to the inpatient unit.  Govind Jain stated they can accept the pt tonight and t

## 2018-02-07 NOTE — CM/SW NOTE
SW contact [address] who stated the pt could dc to the inpatient unit for a short stay to [facility] symptoms\" but would then need to be discharged. Pt's wife refusing this option because she does not want \"move the pt more than once. \"     Teddy Ewing

## 2018-02-07 NOTE — CM/SW NOTE
SW met w/pt's wife regarding dc plan. Informed her [facility] accepted. Pt's wife seemed satisfied w/this, but stated she still wanted to meet w/Carline from [facility] tomorrow.  CHRIS provided understanding to this, but informed her the MD felt the pt was ready for dc

## 2018-02-07 NOTE — HOSPICE RN NOTE
I met with pt and wife for 90 minutes, we discussed hospice and pts decline. We discussed IPU criteria and what that looks like. Wife does not want pt moved more than once, she states she cannot care from him at home and does not want a NH.  Pt appears comf

## 2018-02-07 NOTE — CM/SW NOTE
SW spoke w/Silvia/IZABEL who feels the pt may be appropriate for the inpatient unit since he is no longer receiving transfusions. CHRIS sent updated documentation to [facility] and [facility] to have them re assess for the inpatient unit.      Pt's wife still not at bedsid

## 2018-02-07 NOTE — PROGRESS NOTES
Patient fatigued. No complaints of pain or shortness of breath. Patient does not state any needs at this time. To discuss with  and Brittany Gusman with [facility] about plan today. Per primary hospitalist Md, decision should be made today.  Will c

## 2018-02-07 NOTE — CM/SW NOTE
Patient has a discharge order but family stating that they are not ready to discharge. SW has safe discharge plan in place and discussed with patient and wife. Wife stating that she will appeal discharge.  Detail Notice of Discharge faxed to Medical Records

## 2018-02-07 NOTE — PLAN OF CARE
GASTROINTESTINAL - ADULT    • Maintains adequate nutritional intake (undernourished) Progressing          SKIN/TISSUE INTEGRITY - ADULT    • Incision(s), wounds(s) or drain site(s) healing without S/S of infection Progressing    • Oral mucous membranes rem

## 2018-02-07 NOTE — PROGRESS NOTES
[facility] HOSPITALIST  Progress Note     Elsa Burnett Patient Status:  Inpatient    3/6/1955 MRN [id number]   [facility] 4NW-A Attending Cornelius Persaud MD   [facility] Day # 5 PCP Zahraa Milton DO     Chief Complaint: weakness  S:  Feels weak an PRBC  6. Thrombocytopenia  7. Bleeding from PEG site, improving  8. Lactic acidosis, not sepsis  9. Failure to thrive sp PEG    Hospice to make decision today with family regarding discharge plan.   Patient medically stable for Vivian Fitzgerald MD

## 2018-02-08 NOTE — PROGRESS NOTES
[facility] HOSPITALIST  Progress Note     Oumar Lipps Patient Status:  Inpatient    3/6/1955 MRN [id number]   [facility] 4NW-A Attending Sheila Hua MD   [facility] Day # 6 PCP Nat Graves DO     Chief Complaint: weakness  S:  No changes ov SW assistance appreciated.       Kallie Dougherty MD

## 2018-02-08 NOTE — PLAN OF CARE
GASTROINTESTINAL - ADULT    • Maintains adequate nutritional intake (undernourished) Progressing          Pt. A&Ox4. Flat affect. VSS. Afebrile. Continuous tube feed at 47cc/hr. Aspiration precautions maintained. No c/o pain on shift.  Call light within gregory

## 2018-02-08 NOTE — CM/SW NOTE
TC from Nursing SUpervisor that she received call and fax from [facility] confirming appeal.     Detailed Notice of Discharge, and initial IM brought to Nursing Supervisor per after hour protocol to contact .

## 2018-02-08 NOTE — CM/SW NOTE
CHRIS spoke w/Carline at [facility] who stated she will be meeting w/the pt's wife today. Lennis Epley is not able to confirm at this time if [facility] will be able to accept the pt at their inpatient unit. Lennis Epley stated she needed to see the pt.  Pt's wife filed [facility]

## 2018-02-08 NOTE — PROGRESS NOTES
NURSING DISCHARGE NOTE    Discharged Nursing home via Ambulance. Accompanied by Spouse  Belongings Taken by patient/family and support staff. Pt discharging to [facility] inpatient unit. AVS, transfer, DNR, and face sheet sent with EMS.  Yossi

## 2018-02-09 NOTE — DISCHARGE SUMMARY
Adonay Reyes HOSPITALIST  DISCHARGE SUMMARY     Joe Points Patient Status:  Inpatient    3/6/1955 MRN [id number]   [facility] 4NW-A Attending No att. providers found   Hosp Day # 6 PCP Han Franks DO     Date of Admission: 2018  Date of Procedures during hospitalization:   • None     Incidental or significant findings and recommendations (brief descriptions):  • Per Brief Synopsis of Hospital Course    Lab/Test results pending at Discharge:   · None     Consultants:  • Oncology, Renal INSTRUCTIONS: See electronic chart    Brittany Abad MD 2/9/2018    Time spent:  > 30 minutes

## 2018-02-16 NOTE — PROGRESS NOTES
Education Record    Learner:  Patient    Disease / Diagnosis:  Anemia    Barriers / Limitations:  None   Comments:    Method:  Brief focused   Comments:    General Topics:  Medication, Procedure and Plan of care reviewed   Comments:    Outcome:  Shows unde

## 2018-02-16 NOTE — PROGRESS NOTES
Patient presents with: Follow - Up: APN assessment, possible transfusion     Pt looks better than last visit and is able to interact with more ease. Pt denies N,V,D,C, and pain. He states he is eating well at rehab.      Education Record    Learner:  MARCIE

## 2018-02-16 NOTE — PATIENT INSTRUCTIONS
For Dr. Buck Turpin nurse, call  [phone], ask to speak with his nurse with any questions or concerns Monday through Friday 8:00 to 4:30.   After hours or weekends for emergent needs [phone]

## 2018-02-17 NOTE — PROGRESS NOTES
ANP Visit Note    Patient Name: Roro Bonilla   YOB: 1955   Medical Record Number: [id number]   CSN: [id number]   Date of visit: 2/16/2018       Chief Complaint/Reason for Visit:  Patient presents with:   Follow - Up: APN assessment, possible t Hypoglycemia     Depression     Acidosis     Lactic acidosis     Stage 4 chronic kidney disease ([facility] [address]     Multiple myeloma not having achieved remission (HCC)     Hypercalcemia of malignancy     Anemia associated with chemotherapy     Encounter for hospice Cancer Father    • Heart Disorder Mother    • Diabetes Mother        Social History:    Social History  Social History   Marital status:   Spouse name: N/A    Years of education: N/A  Number of children: N/A     Occupational History  None on file Labs:  Patient Active Problem List  Phosphorus                                    Date: 02/02/2018  Value: 0.8*        Ref range: 2.5 - 4.9 mg/dL    Status: Final  Uric Acid                                     Date: 02/02/2018  Value: 7.3         Ref r Status: Final  WBC                                           Date: 02/03/2018  Value: 5.4         Ref range: 4.0 - 13.0 x10(3*  Status: Final  RBC                                           Date: 02/03/2018  Value: 2.14*       Ref range: 4.30 - 5.70 [id number] Vieira 02/04/2018  Value: POS           Status: Final-Edited  Product Status                                Date: 02/04/2018  Value: Presumed Transfused                       Status: Final-Edited  Expiration Date                               Date: 02/04/2018  Va Status: Final  Neutrophil Absolute Prelim                    Date: 02/03/2018  Value: 3.63        Ref range: 1.30 - 6.70 x10 *  Status: Final  Neutrophil Absolute                           Date: 02/03/2018  Value: 3.63        Ref range: 1.30 - 6.70 [id number] Patter Date: 02/04/2018  Value: 3.84*       Ref range: 0.70 - 1.30 mg/dL  Status: Final  GFR                                           Date: 02/04/2018  Value: 16*         Ref range: >=60               Status: Final  Calcium, Total Date: 02/04/2018  Value: 32.9        Ref range: 31.0 - 37.0 g/dL   Status: Final  RDW                                           Date: 02/04/2018  Value: 19.5*       Ref range: 11.5 - 16.0 %      Status: Final  RDW-SD Ref range: %                  Status: Final  Blood Product                                 Date: 02/05/2018  Value: [id number]      Status: Final-Edited  Unit Number                                   Date: 02/05/2018  Value: [id number] Date: 02/05/2018  Value: 22.0        Ref range: 22.0 - 32.0 mmol*  Status: Final  Magnesium                                     Date: 02/05/2018  Value: 1.8         Ref range: 1.7 - 3.0 mg/dL    Status: Final  Phosphorus 0.48*       Ref range: 0.90 - 4.00 x10(*  Status: Final  Monocyte Absolute                             Date: 02/05/2018  Value: 0.19        Ref range: 0.10 - 0.60 x10(*  Status: Final  Eosinophil Absolute                           Date: 02/05/2018  Value: 10.3 mg/dL   Status: Final  Alkaline Phosphatase                          Date: 02/06/2018  Value: 108         Ref range: 45 - 117 U/L       Status: Final  AST                                           Date: 02/06/2018  Value: 14*         Ref range: 15 - 4 Final  MCV                                           Date: 02/06/2018  Value: 94.8        Ref range: 80.0 - 99.0 fL     Status: Final  MCH                                           Date: 02/06/2018  Value: 31.4        Ref range: 27.0 - 33.2 pg     Status: Date: 02/06/2018  Value: 6           Ref range: %                  Status: Final  Basophil % Manual                             Date: 02/06/2018  Value: 0           Ref range: %                  Status: Final  Total Cells Counted

## 2018-02-19 NOTE — TELEPHONE ENCOUNTER
Per Denise Stafford - patient to see  [facility] APN tomorrow. PL and transfusion. Doc Villa RN notified. Transferred to TOR/Chinmay Mccray to schedule appointment.

## 2018-02-20 PROBLEM — E87.1 HYPONATREMIA: Status: ACTIVE | Noted: 2018-01-01

## 2018-02-20 PROBLEM — E86.0 DEHYDRATION: Status: ACTIVE | Noted: 2018-01-01

## 2018-02-20 PROBLEM — J18.9 NOSOCOMIAL PNEUMONIA: Status: ACTIVE | Noted: 2018-01-01

## 2018-02-20 PROBLEM — D64.9 ANEMIA, UNSPECIFIED TYPE: Status: ACTIVE | Noted: 2018-01-01
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
## 2018-02-20 NOTE — PROGRESS NOTES
ANP Visit Note    Patient Name: Althea Becerril   YOB: 1955   Medical Record Number: [id number]   CSN: [id number]   Date of visit: 2/20/2018       Chief Complaint/Reason for Visit: Follow-up/ multiple myeloma /anemia/ low blood pressure    Oncol days. Immediately liver function increased but initially mildly. However, they then increased significantly. Hepatitis B viral load was checked and found to be lower than before lenolidomide. As a result it was felt that lenolidomide was the cause.  It was rehab. He is requiring chronic transfusions, using a G-tube for feedings although he is able to swallow and eat but has no appetite. Patient's blood was drawn yesterday at subacute rehab and hemoglobin of 5.6 was reported.   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   • Cancer ([facility] [address]     MULTIPLE MYELOMA, LIGHT CHAIN, STAGE 3   • Cataract Social History Main Topics   Smoking status: Former Smoker  1.00 Packs/day  For 10.00 Years     Quit date: 4/20/1992    Smokeless tobacco: Never Used    Alcohol use No    Drug use: No    Sexual activity: Not on file     Other Topics Concern    Caffeine Date: 02/17/2018  Value: O             Status: Final-Edited  UNIT RH                                       Date: 02/17/2018  Value: NEG           Status: Final-Edited  Product Status                                Date: 02/17/2018  Value: Presumed Transfus

## 2018-02-20 NOTE — ED INITIAL ASSESSMENT (HPI)
Sent from cancer center for low Hgb, abnormal creatinine. Pt denies c/o pain, pt c/o generalized fatigue/weakness.

## 2018-02-20 NOTE — CONSULTS
[facility]  Report of Consultation    Althea Becerril Patient Status:  Inpatient    3/6/1955 MRN [id number]   [facility] 4NW-A Attending Kenji Bermudez DO   Hosp Day # 0 PCP Wilder Suarez DO       Assessment / Plan:    1) JENNIE- due to vol PEG  7/1/14: IR PORT A CATH PROCEDURE      Comment: double lumen power port placed  11/2014: OTHER      Comment: stem cell transplant   1973: OTHER SURGICAL HISTORY      Comment: Ulcer - duodenum - surgery- \"clips\" per pt  01/29/2018: TUBE INSERTION N/A bicarbonate tab 650 mg, 650 mg, Oral, TID  •  Venlafaxine HCl (EFFEXOR) tab 37.5 mg, 37.5 mg, Oral, TID  •  acyclovir (ZOVIRAX) tab 400 mg, 400 mg, Oral, BID    No current outpatient prescriptions on file.     Review of Systems:  Please see HPI for pertinen

## 2018-02-20 NOTE — PROGRESS NOTES
Patient presents with: Infusion: APN assessment; pt HgB 5.9, blood transfusion today    Pt is more lethargic than last visit; will speak when spoken to, but not active in conversation.  Pt denies pain, he is more tired than normal. His wife is concerned ab

## 2018-02-20 NOTE — PALLIATIVE CARE NOTE
Consult noted, chart reviewed. Pt had discharged to inpt hospice about 10 days ago then was discharged home from the inpt unit and subsequently revoked hospice. Spoke with oncology APRNELI Cortez.   She states spouse is in \"crisis mode\" right now and does n

## 2018-02-20 NOTE — PROGRESS NOTES
[facility] Pharmacy Note:  Renal Adjustment for meropenem (MERREM)    Bolivar Vargas is a 58year old male who has been prescribed meropenem (MERREM) 500 mg every 8 hrs. CrCl is estimated creatinine clearance is 8.9 mL/min (based on SCr of 7.11 mg/dL (H)).

## 2018-02-20 NOTE — ED PROVIDER NOTES
Patient Seen in: [facility] Emergency Department    History   Patient presents with:  Hypotension (cardiovascular)  Abnormal Result (metabolic, cardiac)    Stated Complaint: low blood pressure/HGB 5.9, multiple myeloma    HPI    The patient is a 58- Weight loss        Past Surgical History:  11/2014: BONE MARROW/STEM XPLANT,AUTOLOGOUS      Comment: stem cell transplant  1/14/08: COLONOSCOPY      Comment: normal  01/29/2018: FEEDING TUBE CARE FOR PEG  7/1/14: IR PORT A CATH PROCEDURE      Comment: doub bilaterally. HEART: Regular rate and rhythm. Normal S1, S2 no S3, or S4. No murmur. ABDOMEN: There is no focal tenderness to palpation appreciated anywhere throughout the abdomen. . There is no guarding, no rebound, no mass, and no organomegaly appreciate Jacqueline Leiva MD on 2/20/2018 at 12:56     Approved by: Roxy Graf MD            Patient had IV line established and have blood work drawn including a set of coags, lactic acid, pro calcitonin, and blood cultures.   In reviewing the patient's records from ear (primary encounter diagnosis)  JENNIE (acute kidney injury) ([facility] [address]  Dehydration  Nosocomial pneumonia    Disposition:  Admit  2/20/2018  1:26 pm    Follow-up:  No follow-up provider specified.       Medications Prescribed:  Current Discharge Medication List

## 2018-02-20 NOTE — H&P
[facility] HOSPITAL[facility]  History and Physical     Elvira Jerri Patient Status:  Emergency    3/6/1955 MRN [id number]   Location [facility] Attending Serafin Glasgow MD   Hosp Day # 0 PCP Elidia Gupta DO     Chief Complaint: Ash Sheppard Comment: EGD with PEG tube placement  1/14/08: UPPER GI ENDOSCOPY - REFERRAL      Comment: + H. Pylori / duodenitis  4-14-97: VASECTOMY    Social History:  reports that he quit smoking about 25 years ago. He has a 10.00 pack-year smoking history.  He has n cyanosis. Integument: No rashes or lesions.       Diagnostic Data:      Labs:  Recent Labs   Lab  02/20/18   0943  02/20/18   1207   WBC  5.0   --    HGB  5.6*   --    MCV  93.8   --    PLT  50.0*   --    INR   --   1.25*       Recent Labs   Lab  02/20/18

## 2018-02-21 NOTE — PLAN OF CARE
HEMATOLOGIC - ADULT    • Maintains hematologic stability Not Progressing    • Free from bleeding injury Not Progressing        Impaired Activities of Daily Living    • Achieve highest/safest level of independence in self care Not Progressing        Impaire

## 2018-02-21 NOTE — CM/SW NOTE
02/21/18 1300   CM/SW Referral Data   Referral Source Social Work (self-referral)  ([id number] [facility])   Reason for Referral Discharge planning   Informant Patient;Spouse   Patient Info   Patient's Mental Status Alert;Oriented   Patient's Home Environment Hous pt and pts wife state they would like pt to be able to sleep in his own bed for as long as possible. They have at [address] [place] [address] at home if they ever run into trouble with getting pt into bed.  PCSW educated pt and pts wife that if they change their mind

## 2018-02-21 NOTE — CONSULTS
David Escobar  [id number]  Hospital Day #1  Date of Consult: 02/21/18       Reason for Consultation: Consult requested for evaluation of palliative care needs and goals of care discussion.     H Comment:Lost voice  Revlimid [Lenalidom*        Comment:Liver problems  Tetracycline            Rash    Comment:Infection  Vancomycin              Fever, Other (See Comments)    Comment:Generalized slough skin. Medications: Complete list reviewed.  Activ counseling and coordinating care. Thank you for allowing the Palliative Care Team to participate in the care of your patient. We will continue to follow.     ROE Kaufman  Palliative Care Nurse Practitioner  Pager [phone], Phone [phone]  2/21/2018  11 scheduling f/u at [facility]. An additional 50 minutes was spent on goals of care discussions and advance care planning.

## 2018-02-21 NOTE — PROGRESS NOTES
[facility] Report of Consultation      Patient Name: Gwendolyn Gaucher   YOB: 1955  Medical Record Number: [id number]  Consulting Physician: Jamie Mirza M.D.    Referring Physician: Luann Holman DO    Date of Consultat chloride 0.9 % 250 mL IVPB 500 mg Intravenous Q24H   acetaminophen (TYLENOL) tab 650 mg 650 mg Oral Q6H PRN   ondansetron HCl (ZOFRAN) injection 4 mg 4 mg Intravenous Q6H PRN   Megestrol Acetate (MEGACE) oral suspension 200 mg 200 mg Oral Daily   Pantopraz Examination   Constitutional Thin; ill appearing. Head Normocephalic and atraumatic. Eyes Conjunctiva clear; sclera anicteric. ENMT External nose normal; external ears normal.  Neck Supple.   Hematologic/Lymphatic No cervical, supraclavicular lymphadeno 57.2 %   Lymphocyte % 27.2 %   Monocyte % 11.6 %   Eosinophil % 1.0 %   Basophil % 0.0 %   Immature Granulocyte % 3.0 %   -ABORH (BLOOD TYPE)   Collection Time: 02/20/18  9:43 AM   Result Value Ref Range   ABO BLOOD TYPE O    RH BLOOD TYPE Positive    -ANT Negative Negative   Influenza A PCR: Negative Negative   Influenza B PCR: Negative Negative   Parainfluenza 1 PCR: Negative Negative   Parainlfuenza 2 PCR Negative Negative   Parainlfuenza 3 PCR Negative Negative   Parainlfuenza 4 PCR Negative Negative   R 53.0 %   PLT 36.0 (L) 150.0 - 450.0 10(3)uL   MCV 92.3 80.0 - 99.0 fL   MCH 30.8 27.0 - 33.2 pg   MCHC 33.3 31.0 - 37.0 g/dL   RDW 17.3 (H) 11.5 - 16.0 %   RDW-SD 57.4 (H) 35.1 - 46.3 fL   Neutrophil Absolute Prelim 1.81 1.30 - 6.70 x10 (3) uL   -MANUAL DI Acute on chronic renal failure: Multifactorial including volume depletion. This has been the patient's patter - he becomes volume depleted and develops rise in creatinine and electrolyte abnormalities. Patient on IVF. Nephrology is following.      2.   [facility]  Section of Hematology/Oncology, [facility]

## 2018-02-21 NOTE — PLAN OF CARE
PRBC 1 unit ordered per Dr Ramirez Rosas and Dr Raj Troncoso. Per Dr Raj Troncoso, only transfuse 1 unit. Blood bank notified.

## 2018-02-21 NOTE — DIETARY MALNUTRITION NOTE
[facility]    NUTRITION INITIAL ASSESSMENT    Pt meets severe malnutrition criteria.     CRITERIA FOR MALNUTRITION DIAGNOSIS:  Criteria for severe malnutrition diagnosis: chronic illness related to wt loss greater than 5% in 1 month, energy intake less Encounters:  02/21/18 : 60.1 kg (132 lb 8 oz)  02/20/18 : 58.4 kg (128 lb 12.8 oz)  02/16/18 : 58.4 kg (128 lb 12.8 oz)  02/02/18 : 58.1 kg (128 lb)  02/02/18 : 59 kg (130 lb)  01/29/18 : 61.2 kg (135 lb)  01/26/18 : 61.2 kg (135 lb)  01/25/18 : 59.7 kg (1

## 2018-02-21 NOTE — PROGRESS NOTES
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

## 2018-02-21 NOTE — PROGRESS NOTES
[facility]PITAL[facility]  Progress Note     Thor Matute Patient Status:  Inpatient    3/6/1955 MRN [id number]   [facility] 4NW-A Attending Jessica Pop DO   Hosp Day # 1 PCP Nani Michelle DO     Chief Complaint: Anemia    S: Patient seen and • meropenem  500 mg Intravenous Q24H   • azithromycin  500 mg Intravenous Q24H   • Megestrol Acetate  200 mg Oral Daily   • Pantoprazole Sodium  40 mg Oral QAM AC   • Venlafaxine HCl  37.5 mg Oral TID       ASSESSMENT / PLAN:     1.  Acute anemia in [facility]

## 2018-02-21 NOTE — PLAN OF CARE
Patient admitted to  417 with anemia,blood transfusion & temp 100 in ED,history of multiple myeloma. 2nd unit of blood transfusing. No adverse reactions. With stable vital signs at this time. No fevers. Tolerating 2nd unit of blood. Bicarb drip started as well

## 2018-02-22 NOTE — PLAN OF CARE
HEMATOLOGIC - ADULT    • Maintains hematologic stability Progressing        A/O x4. HGB 6.8 this am.  1 unit PRBC ordered and transfused without incident. VSS. Signed consent on chart. HGB checked post transfusion 8. Pt aware. No bleeding noted. RA.  Poor

## 2018-02-22 NOTE — PROGRESS NOTES
[facility]  Nephrology Progress Note    Maulik Stanley Attending:  Juan Morelos DO       Assessment and Plan:    1) JENNIE- due to volume depletion, progressive myeloma kidney +/- hypercalcemia (nephrogenic DI + renal vascoconstriction) leading to meta HCT 20.4 02/22/2018   PLT 34.0 02/22/2018   CREATSERUM 5.97 02/22/2018   BUN 78 02/22/2018    02/22/2018   K 3.8 02/22/2018    02/22/2018   CO2 25.0 02/22/2018    02/22/2018   CA 8.7 02/22/2018   MG 2.0 02/22/2018   PHOS 3.3 02/22/2018

## 2018-02-22 NOTE — PROGRESS NOTES
[facility] Progress Note      Patient Name: Paul Calvillo   YOB: 1955  Medical Record Number: [id number]  Attending Physician: Jeannette Hernandez M.D.       SUBJECTIVE:  Reina Hollingsworth is a(n) 58year old male with relapse 78 (H) 8 - 20 mg/dL   Creatinine 5.97 (H) 0.70 - 1.30 mg/dL   GFR, Non- 9 (L) >=60   GFR, -American 11 (L) >=60   Calcium, Total 8.7 8.3 - 10.3 mg/dL   Sodium 140 136 - 144 mmol/L   Potassium 3.8 3.6 - 5.1 mmol/L   Chloride 101 101 - [COMPLETED] meropenem (MERREM) IVPB 1 gram/100 ml MBP 1 g Intravenous Once   [] sodium chloride 0.9% IV bolus 1,749 mL 30 mL/kg Intravenous Continuous   meropenem (MERREM) IVPB 500 mg/100 ml  mg Intravenous Q24H   acetaminophen (TYLENOL) ta

## 2018-02-22 NOTE — PHYSICAL THERAPY NOTE
Multiple PT orders received to evaluate for home PT. Pt does not require acute PT eval to determine need for home PT as already set up. D/w SW. Will f/u on 2/23 if pt remains hospitalized.

## 2018-02-22 NOTE — DIETARY NOTE
Nutrition Short Note    Spoke with RN regarding bolus TF recommendations for home. Recommend 4.5 cans (240 mL) of  Osmolite 1.5 daily, recommend 1.5 cans in morning, 2 cans in the afternoon, and 1 can in the evening with each bolus given over 30 minutes.  R

## 2018-02-22 NOTE — PROGRESS NOTES
Patient feeling better today; denies pain,sob,nausea. Labs improving, save for hemoglobin still on low side. i'm ok with d/c since he will have QOD f/u with heme/onc. Stop abx, as no source of infxn. Await renal recs.     Delisa Doherty MD  Donn Sergei

## 2018-02-22 NOTE — CM/SW NOTE
RN asking about tube feeding pump. SW called  [phone] and spoke w/Bella who stated the pt does not qualify for a tube feeding pump because of the dx of severe malnutrition. She stated he also does not qualify because he has a PO intake.  Wife very ups

## 2018-02-22 NOTE — PLAN OF CARE
HEMATOLOGIC - ADULT    • Maintains hematologic stability  Hgb 7.1 this am. No signs of active bleeding Adequate for Discharge    • Free from bleeding injury Adequate for Discharge        Impaired Activities of Daily Living    • Achieve highest/safest level

## 2018-02-22 NOTE — CM/SW NOTE
SW spoke w/pt and wife regarding rental costs of tube feeding pump. Informed her it would be $71 per month and $6 per day. Wife agreeable to this. Paged MD for corrected tube feeding pump order. CHRIS sent referral to [facility].  Magy Byrd from [place] to Hans Ellis

## 2018-02-22 NOTE — PALLIATIVE CARE NOTE
Discharge Orders Placed. PCSW spoke with pts [facility]/Tami who states Dietary should meet with the pts wife to make recommendations on what type of tube feedings to purchase for pt once d/c.  Pts [facility]/Tami also states that PT will see pt before d/c to document

## 2018-02-23 NOTE — PROGRESS NOTES
Palliative Care Consult Note    Patient Name: Pedro Benton   YOB: 1955   Medical Record Number: [id number]   CSN: [id number]   Date of visit: 2/23/2018       Chief Complaint/Reason for Visit:  Palliative initial visit for goals and symptoms disease     Pancytopenia ([facility]     Hypogammaglobulinemia, acquired ([facility]     Metabolic acidosis     Hypotension     Hypoadrenalism (HCC)     Hypoglycemia     Depression     Acidosis     Lactic acidosis     Stage 4 chronic kidney disease ([facility]     Multiple Topics   Smoking status: Former Smoker  1.00 Packs/day  For 10.00 Years     Quit date: 4/20/1992    Smokeless tobacco: Never Used    Alcohol use No    Drug use: No    Sexual activity: Not on file     Other Topics Concern    Caffeine Concern Yes    Comment: palpitations  Abdomen:  Denies pain, G tube, See HPI  Psych:  No complaints. Sleeping well      Palliative Performance Scale:   50%      Physical Examination:   General: alert and oriented, not in acute distress. Respiratory: Clear to auscultation.   Card

## 2018-02-23 NOTE — PROGRESS NOTES
02/22/18 7454   Clinical Encounter Type   Visited With Patient and family together  (Patient refused ministry from the , reporting his own  had come earlier)   Continue Visiting No

## 2018-02-23 NOTE — DISCHARGE SUMMARY
[facility] HOSPITAL[facility]  DISCHARGE SUMMARY     Elvira Guthrie Patient Status:  Inpatient    3/6/1955 MRN [id number]   [facility] 4NW-A Attending No att. providers found   Hosp Day # 2 PCP Elidia Gupta DO     Date of Admission: 2018  Date of code and full measures for now, as he is not ready for hospice. He and wife will continue talking, as they have somewhat different outlooks. See palliative note.   Will f/u with heme/onc every 2-3 days outpatinet for fluids, transfusions, etc.    Procedur PROAMATINE      Take 5 mg by mouth 3 (three) times daily.    Refills:  0     Pantoprazole Sodium 40 MG Tbec  Commonly known as:  PROTONIX      Take 40 mg by mouth every morning before breakfast.   Refills:  0     sodium bicarbonate 650 MG Tabs      Take 650

## 2018-02-23 NOTE — PROGRESS NOTES
Pt seen in clinic today, no blood orders at this time. Per Dr. Renetta Resendez, pt to RTC on Sunday for IVF, then again on Tuesday to get labs, see Josse Jimenez, IVF and possible transfusion. Pt and wife verbalized plan.       Education Record    Learner:  Patient    Dis

## 2018-02-23 NOTE — PROGRESS NOTES
Initial Post Discharge Follow Up   Discharge Date: 2/22/18  Contact Date: 2/23/2018    Consent Verification:  Assessment Completed With: Patient  Spouse: Shima Dubon received per patient?  written  HIPAA Verified?   Yes    Discharge Dx:    Anemia D 400 MG Oral Tab Take 0.5 tablets (200 mg total) by mouth 2 (two) times daily. Disp: 30 tablet Rfl: 2   Entecavir 0.5 MG Oral Tab Take 1 tablet (0.5 mg total) by mouth once a week.  Disp: 6 tablet Rfl: 0   allopurinol 100 MG Oral Tab Take 100 mg by mouth tatiana [facility] the nurse will be going out tomorrow 2/24/18. NCM called patient's wife Jo back to let her know. DME ordered at D/C? No, patient has a toilet seat riser, slide bench for shower    Services ordered at D/C? No, not at this time.        [facility]  [facility] [address] [address] [phone]          PCP TCM/HFU appointment: scheduled at D/C within 7-14 days yes     NCM Reviewed/scheduled/rescheduled PCP TCM/HFU appointment with anna

## 2018-02-25 NOTE — PROGRESS NOTES
Education Record    Learner:  Patient and Spouse    Disease / Diagnosis:MM/dehydration    Barriers / Limitations:  None   Comments:    Method:  Discussion   Comments:    General Topics:  Procedure, Plan of care reviewed and Fall risk and prevention   Comme

## 2018-02-26 NOTE — PROGRESS NOTES
[employer] PAP Application completed and faxed to [employer] at [phone] with financial documents.

## 2018-02-27 NOTE — PROGRESS NOTES
Patient presents with: Follow - Up: APN assessment possible hydration or transfusion    Pt is not taking much by mouth, getting 3 bottles of TF per day currently. Pt color is good, he will interact when directed.  Wife continues to direct the majority of t

## 2018-02-27 NOTE — PROGRESS NOTES
Education Record    Learner:  Patient and Spouse    Disease / Diagnosis: Multiple Myeloma    Barriers / Limitations:  None   Comments:    Method:  Brief focused   Comments:    General Topics:  Side effects and symptom management and Plan of care reviewed

## 2018-02-27 NOTE — PROGRESS NOTES
ANP Visit Note    Patient Name: Roro Bonilla   YOB: 1955   Medical Record Number: [id number]   CSN: [id number]   Date of visit: 2/27/2018       Chief Complaint/Reason for Visit: Follow-up multiple myeloma    Oncologic History  Roro Bonilla function increased but initially mildly. However, they then increased significantly. Hepatitis B viral load was checked and found to be lower than before lenolidomide. As a result it was felt that lenolidomide was the cause.  It was discontinued and the tra home health at home and aide for bathing. He is currently has a g-tube for feeding and is using 3-4 cans a day ensure enlive per dietary recommended orders. Spouse reports two episodes of loose stools yesterday.   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   Vital Signs: /58 (BP Location: Left arm, Patient Position: Sitting, Cuff Size: adult)   Pulse 87   Temp 98.6 °F (37 °C) (Tympanic)   Resp 18 02/23/2018  Value: 16.9*       Ref range: 11.5 - 16.0 %      Status: Final  RDW-SD                                        Date: 02/23/2018  Value: 53.6*       Ref range: 35.1 - 46.3 fL     Status: Final  Neutrophil Absolute Prelim                    Date: Date: 02/23/2018  Value: 0           Ref range: %                  Status: Final  Total Cells Counted                           Date: 02/23/2018  Value: 100           Status: Final  RBC Morphology                                Date: 02/23/2018  Va

## 2018-03-01 NOTE — PROGRESS NOTES
Nephrology Progress Note      Thor Matute is a 58year old male. 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   He was recently hospitalized for severe volume depletion and associa Social History: Smoking status: Former Smoker                                                              Packs/day: 1.00      Years: 10.00        Quit date: 4/20/1992  Smokeless tobacco: Never Used                      Alcohol use:  No                 M lb   BMI 20.08 kg/m²   Wt Readings from Last 6 Encounters:  03/01/18 : 136 lb  03/01/18 : 136 lb 3.2 oz  03/01/18 : 135 lb  02/27/18 : 136 lb 6.4 oz  02/23/18 : 137 lb 6.4 oz  02/22/18 : 132 lb 4.4 oz       General: Alert and oriented in no apparent distre

## 2018-03-01 NOTE — PROGRESS NOTES
ANP Visit Note    Patient Name: Rea Gu   YOB: 1955   Medical Record Number: [id number]   CSN: [id number]   Date of visit: 1/25/18    Chief Complaint/Reason for Visit:  Patient presents with:  Weakness: APN assessment; pt with 10 lb weig ([facility]     Anemia, unspecified type     Dehydration     Nosocomial pneumonia     Palliative care by specialist     Goals of care, counseling/discussion     Acute renal failure with acute tubular necrosis superimposed on stage 5 chronic kidney disease, not o Father    • Heart Disorder Mother    • Diabetes Mother        Social History:    Social History  Social History   Marital status:   Spouse name: N/A    Years of education: N/A  Number of children: N/A     Occupational History  None on file     Keith (three) times daily. , Disp: , Rfl:     Review of Systems:  A comprehensive 14 point review of systems was completed. Pertinent positives and negatives noted in the the HPI.     Performance Status: ECOG 1    Physical Examination:  General: Patient is alert Date: 01/23/2018  Value: 1.68*       Ref range: <=0.00 g/dL        Status: Final  M-Luis Carlos                                       Date: 01/23/2018  Value: 0.37*       Ref range: <=0.00 g/dL        Status: Final  Protein Elect Interpret

## 2018-03-01 NOTE — PROGRESS NOTES
HPI:    Abdelrahman Langtson is a 58year old male here today for hospital follow up.    He was discharged from Inpatient hospital, [facility] to Home   Admission Date: 2/20/18   Discharge Date: 2/22/18  Hospital Discharge Diagnoses (since 1/30/2018)     see Oral Tab Take 1,000 mg by mouth 2 (two) times daily. Vitamin B-12 1000 MCG Oral Tab Take 1,000 mcg by mouth daily. hydrocortisone 20 MG Oral Tab Take 20 mg by mouth 2 (two) times daily.  Total dose= 25mg twice daily   hydrocortisone 5 MG Oral Tab Take 5 No LMP for male patient. Estimated body mass index is 19.94 kg/m² as calculated from the following:    Height as of this encounter: 69\". Weight as of this encounter: 135 lb.    /64 (BP Location: Right arm, Patient Position: Sitting, Cuff Size: a

## 2018-03-01 NOTE — PROGRESS NOTES
Patient presents with: Follow - Up: APN assessment, possible fluids    Pt states he is improving with his PT/OT, continues to deny pain.  He is still not eating very much via mouth, he has ideas about some things he would like to eat, however his wife is c

## 2018-03-01 NOTE — PROGRESS NOTES
Axel Griffin, NP, aware of Hgb 7.5 - no further orders. Patient to return next Tuesday for labs, APN, and fluids.     Education Record    Learner:  Patient and Spouse    Disease / Diagnosis: Multiple Myeloma    Barriers / Limitations:  None   Comments:    ALBERTO

## 2018-03-06 NOTE — PROGRESS NOTES
Patient presents with: Follow - Up: APN assessment - possible fluids, transfusion    Pt experiencing increased tremors on the left side, decreased energy; denies N,V,C and pain. He had episode of explosive diarrhea this am. Wife is frustrated about care.

## 2018-03-06 NOTE — PROGRESS NOTES
Education Record    Learner:  Patient and Spouse    Disease / Diagnosis: Multiple Myeloma    Barriers / Limitations:  None   Comments:    Method:  Brief focused and Discussion   Comments:    General Topics:  Medication, Side effects and symptom management,

## 2018-03-06 NOTE — PROGRESS NOTES
ANP Visit Note    Patient Name: Rea Gu   YOB: 1955   Medical Record Number: [id number]   CSN: [id number]   Date of visit: 3/6/2018       Chief Complaint/Reason for Visit: Follow-up /multiple myeloma    Oncologic History  Rea Gu function increased but initially mildly. However, they then increased significantly. Hepatitis B viral load was checked and found to be lower than before lenolidomide. As a result it was felt that lenolidomide was the cause.  It was discontinued and the tra reports he is having tremors at home. She feels that he is sleepier and his mentation is off. He often doesn't answer questions at home appropriately. He had one explosive bowel movement today.   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     MULTIPLE MYELOMA, LIGHT CHAIN, STAGE 3   • Cataract     H/O Cataract surgery both Packs/day  For 10.00 Years     Quit date: 4/20/1992    Smokeless tobacco: Never Used    Alcohol use No    Drug use: No    Sexual activity: Not on file     Other Topics Concern    Caffeine Concern Yes    Comment: 2/ day    Exercise No    Weight Concern No History 3/6/2018   /64   BP Location Left arm   Patient Position Sitting   Cuff Size adult   Pulse 85   Resp 18   Temp 98.4   SpO2 98   Weight 137 lbs 10 oz   Height 69.016   BODY MASS INDEX    HEENT: EOMs intact. PERRL. Oropharynx is clear.    Neck: 03/01/2018  Value: 0.6         Ref range: 0.1 - 2.0 mg/dL    Status: Final  Total Protein                                 Date: 03/01/2018  Value: 8.6*        Ref range: 6.1 - 8.3 g/dL     Status: Final  Albumin                                       Date: Ref range: 11.5 - 16.0 %      Status: Final  RDW-SD                                        Date: 03/01/2018  Value: 58.1*       Ref range: 35.1 - 46.3 fL     Status: Final  Neutrophil Absolute Prelim                    Date: 03/01/2018  Value: 3.12 Final  ------------    Impression/Plan:    Multiple Myeloma: Patient has failed multiple lines of therapy. There are other options for the patient but his current performance status precludes such therapy. Patient not interested in hospice at this time.   P

## 2018-03-08 NOTE — PROGRESS NOTES
Patient is here today for follow up with Dr. Renetta Resendez for multiple myeloma. Patient denies pain today. Wife stated patient is having increased weakness for the last three days - knees buckling when trying to stand.  Medication list, medical history and toxic

## 2018-03-08 NOTE — PROGRESS NOTES
Education Record    Learner:  Patient and Spouse    Disease / Diagnosis: Multiple Myeloma    Barriers / Limitations:  None   Comments:    Method:  Brief focused   Comments:    General Topics:  Plan of care reviewed   Comments:    Outcome:  Shows understand

## 2018-03-09 NOTE — PROGRESS NOTES
SW spoke to patient's wife Zeina Kimble and advised that [facility] will allow IV hydration, but they advise they will teach the wife to manage.   Initially, she advised that he is not yet ready -- maybe next week -- but SW suggested sending in the referral, a

## 2018-03-12 NOTE — PROGRESS NOTES
CHRIS made a referral to [facility] as requested by patients wife. The order is for informational information eval and treat. CHRIS faxed all pertinent information to [facility] fax # [phone].

## 2018-03-12 NOTE — TELEPHONE ENCOUNTER
Wife called wanting to know what medications patient should be taking and which ones they should discontinue? She would also like to know if they should keep doing whatever the dietician in the hospital recommended?

## 2018-03-12 NOTE — TELEPHONE ENCOUNTER
Spoke to wife, updated med list per Dr Tiffanie Malone. Patient to continue same nutrition that he was getting in the hospital 2/3 tube feedings and about 1/3 eating himself.

## 2018-03-15 NOTE — TELEPHONE ENCOUNTER
Patient is being admitted to [facility]- they will be sending paperwork for  to sign

## 2018-03-16 NOTE — PROGRESS NOTES
[facility] Progress Note      Patient Name: Lucía Reynoso   YOB: 1955  Medical Record Number: [id number]  Attending Physician: Sarahi Kennedy M.D.      Date of Visit: 3/8/2018      Chief Complaint  Multiple myeloma, light chain lloyd maintenance lenolidomide 5 mg daily x 21 days every 28 days. Immediately liver function increased but initially mildly. However, they then increased significantly. Hepatitis B viral load was checked and found to be lower than before lenolidomide.  As a resu fatigue. He reports continued poor oral intake. He denies any new pain. Wife states that he is unsteady on his feet. She is afraid of him falling. Performance Status   Karnofsky 50% - Requires frequent medical help and considerable assistance.     P Take 650 mg by mouth 3 (three) times daily. Disp:  Rfl:    [DISCONTINUED] Venlafaxine HCl 37.5 MG Oral Tab Take 37.5 mg by mouth 3 (three) times daily.  Disp:  Rfl:        Allergies   Mr. Wilber Olivera is allergic to pcn [penicillins]; revlimid [lenalidomide]; Leane Prim Intact <6.3 (L) 11.1 - 79.5 pg/mL   -PHOSPHORUS   Collection Time: 03/06/18 11:55 AM   Result Value Ref Range   Phosphorus 2.4 (L) 2.5 - 4.9 mg/dL   -MAGNESIUM   Collection Time: 03/06/18 11:55 AM   Result Value Ref Range   Magnesium 1.7 1.7 - 3.0 mg/dL Impression and Plan   1. Multiple myleoma: Patient's performance status declining. Not a candidate for aggressive anticancer therapy. 2.  Reactivation of hepatitis B: He remains on entecavir. 3.   Anemia: Due to underlying myeloma.  He rece

## 2018-03-23 NOTE — TELEPHONE ENCOUNTER
OK to stop medications. V/o Dr. Maulik Grove. Left message on Karey's confidential voicemail advising of this.

## 2018-04-02 ENCOUNTER — SOCIAL WORK SERVICES (OUTPATIENT)
Dept: HEMATOLOGY/ONCOLOGY | Facility: HOSPITAL | Age: 63
End: 2018-04-02

## 2018-04-02 NOTE — PROGRESS NOTES
Patient's wife asked if we can use leftover osmolite and \"kangaroo bags. \"  CHRIS discussed with Alvarado Siddiqui who advises htat we cannot accept donated supplies.   SW advised patient that she can donate back to the [facility], and the person who needs the prod

## 2018-04-23 NOTE — PROGRESS NOTES
[facility] Progress Note    Patient Name: Nury Vidal   YOB: 1955   Medical Record Number: [id number]   CSN: [id number]   Date of visit: 3/1/2018   Provider: IZABEL Pierre  Referring Physician: No ref.  provider found    Problem L initially presented to the emergency room on 05/12/2014 with severe lower back pain, renal failure, and marked anemia and thrombocytopenia.  He was subsequently diagnosed with multiple myeloma with diffuse lytic skeletal lesions.  Workup showed dominant la improved.      On 08/26/2016 patient began therapy with carlfizomib and dexamethasone because of an increase in lambda free light chains and new lytic lesions on skeletal survey. Cycle 1 was uncomplicated.  Cycle 2 was complicated by grade 2 thrombocytopeni Ensure Plus 4 bottles bolus/day. Trying to get a different formula per wife so that he can have it continuously at night time. She is concerned that the bolus feeds during the day is interfering with his appetite.   He is using cannabis gummies to help wi moist.  Neck:  Supple, no tenderness, no masses or adenopathy  Lungs:  Clear to auscultation bilaterally  CV:  Regular rate and rhythm  Abdomen:  Non-distended, normoactive bowel sounds, soft,nontender, no hepatosplenomegaly.   Extremities:  No edema, no te pleasure feeds. General debility:  Due to malignancy and complications from past sepsis, pt unable to recover his strength. Pt was in hospice care but that was revoked therefore conitnuing palliative mgmt. He restarted PT/OT.     [facility]

## 2018-08-29 NOTE — PROGRESS NOTES
[facility] Progress Note      Patient Name: Le Mitchell   YOB: 1955  Medical Record Number: [id number]  Attending Physician: Cristiano Barnard M.D.       SUBJECTIVE:  Jono Hidalgo is a(n) 58year old male with relapse place. Electronically signed by:    Alanna Tom M.D.   [facility]  Director, Bone and Soft Tissue Sarcoma Program  Section of Hematology/Oncology, [address] [facility] [address]:

## 2018-12-20 NOTE — PROGRESS NOTES
02/22/18 1207   Clinical Encounter Type   Visited With Patient not available   Routine Visit (Responded to the consult)    attempted to visit; however the patient was sleeping at the time. Respected his time of rest/healing.   will remain no

## 2021-06-14 NOTE — PHYSICAL THERAPY NOTE
"6/14/2021       RE: Lesley Stafford  [address]  [place] [address]     Dear Colleague,    Thank you for referring your patient, Lesley Stafford, to the [facility] NEUROLOGY [facility] [place] at [facility]. Please see a copy of my visit note below.    [facility] Neurology Follow Up Visit    Lesley Stafford MRN# [id number]   Age: 62 year old YOB: 1958     Brief history of symptoms: The patient was initially seen in neurologic consultation on 12/23/2019 through 12/24/2019 for R-frontal and parietal ischemic strokes (ESUS) with the stroke team, and most recently for follow up with myself on 3/12/2021 after being seen again 3/1/2021 as an inpatient consult for left arm numbness and R-eye scotoma following a fall where she struck her head.  Please refer to these notes for further details on prior history, impression, and plans.  Overall, the patient has had neuropathy in her feet leading to pain along with her history of stroke.  She was to continue with statin, aspirin, and HTN goals for stroke prophylaxis as well as continue gabapentin for neuropathic pain.   After her follow up with ophthalmology, 3/9/2021, she was found to have posterior vitreous detachment of both eyes, and was still reporting left visual field obscuration with a  \"pacman\" like shape.  After our last visit, the patient was still reporting left sided UE numbness occurring a few times per week and lasting minutes.  Given that she was having repeated stereotyped sensory events without changes noted on MRI done in early 3/2021, it was thought that she may be having focal seizures and an EEG was to be done.  Prior MRI (3/1/2021) was revealing for resolution of prior diffusion restriction of right frontoparietal junction and right sided bryson, but there was also a questionable round T2 hyperintensity near the right inferior olivary nucleus (artifact versus other). " PHYSICAL THERAPY TREATMENT NOTE - INPATIENT    Room Number: 458/458-A     Session: 1     Number of Visits to Meet Established Goals: 3 (additional 3 visits to start 1/9/18)    Presenting Problem: Septic Shock     History related to current admission: Pt i She also reported a history of somnambulism, yelling our in her sleep and acting out dreams.  She was referred to sleep medicine for concern of REM behavior sleep disorder and RLS.  She was to follow up with me to discuss whether her concussion symptoms (headache, blurred vision, nausea, imbalance) were improving over time as expected.    Interval history: EEG on 4/6/2021 did show occasional runs of left temporal alpha activities in drowsiness, but otherwise there was noted generalized theta slowing in waking.  Impression was for mild electrographic encephalopathy.    The patient still feels that she is having visual obscuration (seeing things in odd ways (shapes/people)) over her left visual field.  She still has a Pac-man shape in her right eye alone.  She does feel that her nausea, blurred vision, headache are improved but has a continued sense of being off-balance. She may have some tinnitus occasionally, mostly in her left ear.  Her headaches are present 1-2 times per week, and has tried improving water intake.  The pain is in the posterior occipital, and throbbing.  The pain comes from a her neck, and improves with a tylenol.  The headaches last 30-40 minutes.  There is no migrainous qualities with these headaches.  When describing her imbalance, she reports a feeling of light-headedness and swirling sensation.  She doesn't feel that her head is clear or steady.  The sensations come on quickly and sub-acutely (over a few minutes).  She may feel like her brain isn't moving at the speed of time around her, like she is in the twilight zone in her head.     Medications:     Current Outpatient Medications   Medication Sig     albuterol (PROAIR HFA/PROVENTIL HFA/VENTOLIN HFA) 108 (90 Base) MCG/ACT inhaler Inhale 2 puffs into the lungs every 4 hours as needed for shortness of breath / dyspnea, wheezing or other (coughing)     aspirin (ASA) 325 MG EC tablet TAKE 1 TABLET BY MOUTH EVERY DAY START TAKING 1/15/2020 AFTER  • Renal disorder     Myeloma kidney   • Stomach ulcer    • Visual impairment        Past Surgical History  Past Surgical History:  1/14/08: COLONOSCOPY      Comment: normal  7/1/14: IR PORT A CATH PROCEDURE      Comment: double lumen power port placed  1 Impairment Score: 41.77%   Standardized Score (AM-PAC Scale): 45.44   CMS Modifier (G-Code): CK    FUNCTIONAL ABILITY STATUS  Gait Assessment   Gait Assistance: Maximum assistance; Other (Comment) (actual = min assist)  Distance (ft): 120  Assistive Device: TAKING 81 MG TAB.     atorvastatin (LIPITOR) 80 MG tablet Take 0.5 tablets (40 mg) by mouth daily     DEEP SEA NASAL SPRAY 0.65 % nasal spray SPRAY 1 SPRAY INTO BOTH NOSTRILS DAILY AS NEEDED FOR CONGESTION     DULoxetine (CYMBALTA) 60 MG capsule Take 60 mg by mouth daily     flunisolide (NASALIDE) 25 MCG/ACT (0.025%) SOLN spray INHALE 2 SPRAYS INTO EACH NOSTRIL 2 TIMES DAILY     fluticasone (FLONASE) 50 MCG/ACT nasal spray Spray 1 spray into both nostrils daily     gabapentin (NEURONTIN) 300 MG capsule TAKE 2 CAPSULES BY MOUTH 3 TIMES A DAY     gentamicin (GARAMYCIN) 0.3 % ophthalmic solution Place 1-2 drops into the right eye every 4 hours     hydroxychloroquine (PLAQUENIL) 200 MG tablet TAKE 1 TABLET BY MOUTH DAILY     hydrOXYzine (ATARAX) 25 MG tablet TAKE 1 TABLET BY MOUTH THREE TIMES A DAY     multivitamin, therapeutic with minerals (MULTI-VITAMIN) TABS Take 1 tablet by mouth daily     naproxen sodium 220 MG capsule Take 220 mg by mouth 2 times daily (with meals)     omega 3 1200 MG CAPS      order for DME Cane,     SM ASPIRIN 325 MG tablet TAKE 1 TABLET BY MOUTH EVERY DAY START TAKING 1/15/2020 AFTER TAKING 81 MG TAB     tiZANidine (ZANAFLEX) 4 MG tablet TAKE 1-2 TABLETS BY MOUTH AS NEEDED THREE TIMES A DAY     valACYclovir (VALTREX) 500 MG tablet Take 1 tablet (500 mg) by mouth 2 times daily     VYVANSE 10 MG capsule 10 mg by Oral or Feeding Tube route daily      Physical Exam:   General: Seated comfortably in no acute distress.  HEENT: Neck supple with normal range of motion.   Skin: No rashes  Neurologic:     Mental Status: Fully alert, attentive and oriented. Speech clear and fluent, no paraphasic errors.     Cranial Nerves: EOMI with normal smooth pursuit. Facial movements symmetric. Hearing not formally tested but intact to conversation.  No dysarthria.     Motor: No tremors or other abnormal movements observed.          Assessment and Plan:   Assessment:  Central vertigo, transient  Cervicogenic  headache, posterior occiput b/l    The patient is still having some headaches, as well as imbalance that is more of a light-headed feeling or atypical sensation of personal space/processing information around her.  Her prior MRI findings of frontal diffusion restriction had abated, and she has no current symptoms (focal) that are attributed to her prior region of insult.  I still am uncertain as to what to make f her olivary T2 hyperintensity, given her lack of symptoms associated with a lesion in that region.  Given her ongoing imbalance, headaches, I think expanding her w/up to include other etiologies that could cause a olivary signal change as seen in her MRI as well as other signal changes in prior MRI needs to be done (paraneoplastic, vitamin deficiencies, autoimmune related etiologies).  She would also benefit from having vestibular testing to indicate if there is truly an ongoing central vertigo versus peripheral vertigo.  I will want close follow up with the patient, as pending the results of testing, she will likely need repeat MRI brain in the near future to compare to 3/2021 imaging.     Plan:  Vestibular evaluation  Paraneoplastic, anti-ASHLEY, anti-gliadin, anti-endomysial, TPO, TSH w/T4 reflex, B1, B12, B6    Follow up in Neurology clinic in 4 weeks or earlier as needed should new concerns arise.    THOMAS Salgado D.O.   of Neurology    Total time today (45 min) in this patient encounter was spent on pre-charting, counseling and/or coordination of care. We reviewed diagnostic results, impressions, and discussed other possible tests if symptoms do not improve. We discussed the implications of the diagnosis, as well as risks and benefits of management options. We reviewed treatment instructions and our scheduled follow-up as specified in the discharge plan. We also discussed the importance of compliance with the chosen course of treatment. The patient is in agreement with this plan  mechanics; Endurance; Patient education;Gait training;Strengthening;Stoop training;Stair training;Transfer training;Balance training  Rehab Potential : Good  Frequency (Obs): 5x/week      CURRENT GOALS        Goal #1 Patient is able to demonstrate supine - s and has no further questions.      Lesley is a 62 year old who is being evaluated via a billable video visit.      How would you like to obtain your AVS? Mychart  If the video visit is dropped, the invitation should be resent by: [phone]      Video Start Time: 0900  Video-Visit Details    Type of service:  Video Visit    Video End Time:9:23 AM    Originating Location (pt. Location): [place]    Distant Location (provider location):  [facility] NEUROLOGY [facility]     Platform used for Video Visit: audrey        Again, thank you for allowing me to participate in the care of your patient.      Sincerely,    Deven Salgado, DO

## 2022-09-29 NOTE — PROGRESS NOTES
[facility]  Nephrology Progress Note    Kerrie Marcano Attending:  Brayan Lucas MD       Subjective:  Awake alert no new complaints      Current Facility-Administered Medications:  glucose (DEX4) oral liquid 15 g 15 g Oral PRN   dextrose 50% injecti Spoke with pt, he has not been able to get the Freestyle. He has a follow up with Dr Nyla Ramirez on Monday.      Blood sugar readings  9/18 121 (am) 140 (hs)  9/19 220 (am)   9/20 182 ( am) 234 ( HS)  9/21 173 (am)   9/23 188 ( am)  159 (hs)  9/24 85( am)  159 (hs)  9/25 107 (am) 114 (hs)  9/26 85  9/27 270 (am) 286 (pm)  9/28 146 (am) 128 (pm)  9/29 163 (am) 4557   Gross per 24 hour   Intake             2694 ml   Output              900 ml   Net             1794 ml     Wt Readings from Last 3 Encounters:  01/14/18 : 141 lb 11.2 oz  12/28/17 : 152 lb 3.2 oz  12/26/17 : 151 lb 12.8 oz    General: awake alert  HE

## 2023-01-09 NOTE — Clinical Note
CT renal scan ordered by urology shows the following  Results were reviewed with the patient  Made aware that there was no renal mass identified on this CAT scan  Urologist that ordered test no longer with their practice  Patient has a follow-up with a new urologist at the end of March  Counseled to keep that appointment  Patient also counseled that if he develops signs of urinary tract infection, fever, chills, urinary burning, frequency urgency, hematuria to follow-up with this office  1   Cholelithiasis  2   No renal mass identified  3   Compared to the prior study from 10/29/2022, there is interval resolution of focal edema in the left upper pole kidney which has evolved into cortical scarring  This is most suggestive of resolved focal pyelonephritis  Consider follow-up renal   ultrasound in 6 months    4   Persistent nonspecific perinephric stranding bilaterally and haziness of fat within the abdomen and subcutaneous tissue, suggestive of component of third spacing of fluid/mild anasarca FYI, TCM call made, see notes. NCM confirmed TCM HFU On 3/2/18.

## 2023-02-10 NOTE — TELEPHONE ENCOUNTER
Per Alireza Lanier. Monocolonal protien studies are worse. Patient needs to continue with tube feedings to increase strength. To see Alireza Lanier next week Thursday with labs followed by treatment. Transferred to [facility] to schedule. no

## 2025-01-31 NOTE — PLAN OF CARE
Chief Complaint   Patient presents with   • Rash     Rash on hands, pt mom states it gets painful and flared up at night x1 week.         SHAUNNA Beckham is an otherwise healthy 10 year old female who presents to clinic today with her mom for evaluation of a hand rash. Her mom reports that her rash first developed about a week ago on the back of her hands. She reports that the rash on the back of her hands flares up slighlty at night. They have been applying Weleda lotion at night and at first the lotion burns and then ultimately she has significant reduction in the burning symptoms. She reports that the irritation has persisted despite using otc steroid cream from the pharmacy yesterday. Her mom reports that they will be traveling to [place] tomorrow and wants to make sure she is not at risk for infection.       Recent PHQ 2/9 Score    PHQ 2:       PHQ 9:        Most Recent ENOC 7 Score           ROS: All other systems negative.    Past Medical History:   Diagnosis Date   • ADHD     borderline eval in process 10/2024   • Dyslexia         History reviewed. No pertinent surgical history.     Family History   Problem Relation Age of Onset   • Patient is unaware of any medical problems Mother    • Patient is unaware of any medical problems Father    • Asthma Brother    • Valvular heart disease Maternal Grandmother    • Diabetes Maternal Grandfather    • Cancer, Breast Paternal Grandmother    • Dementia/Alzheimers Paternal Grandmother    • Diabetes Paternal Grandfather         Social History     Socioeconomic History   • Marital status: Single     Spouse name: Not on file   • Number of children: Not on file   • Years of education: Not on file   • Highest education level: Not on file   Occupational History   • Not on file   Tobacco Use   • Smoking status: Never     Passive exposure: Never   • Smokeless tobacco: Never   Vaping Use   • Vaping status: never used   Substance and Sexual Activity   • Alcohol use: Not on file   •  Problem: RESPIRATORY - ADULT  Goal: Achieves optimal ventilation and oxygenation  INTERVENTIONS:  - Assess for changes in respiratory status  - Assess for changes in mentation and behavior  - Position to facilitate oxygenation and minimize respiratory effo Drug use: Never   • Sexual activity: Never   Other Topics Concern   • Not on file   Social History Narrative    Mother-Meryl Christie    Brother- Ishmael    Father- Wallace    Born in [place].    Currently residing in [place] due to father's employment.  Moved to [place] September 2024.    New patient 10/16/2024    4 th grade     Social Determinants of Health     Financial Resource Strain: Not on file   Food Insecurity: Not on file   Transportation Needs: Not on file   Physical Activity: Not on file   Stress: Not on file   Social Connections: Not on file   Interpersonal Safety: Not on file        Immunization History   Administered Date(s) Administered   • DTaP 02/27/2015, 05/08/2015, 07/22/2016, 01/16/2019   • HIB, Unspecified Formulation 02/27/2015, 05/08/2015, 08/06/2015, 01/13/2016   • Hep A, ped/adol, 2 dose 10/29/2024   • Hep B, adolescent or pediatric 2014, 02/27/2015, 05/08/2015, 08/06/2015   • Influenza, split virus, trivalent, PF 10/29/2024   • MMR 01/13/2016, 07/22/2016   • Meningococcal C conjugate 01/13/2016   • Pneumococcal conjugate PCV 13 02/27/2015, 05/08/2015   • Polio, Unspecified Formulation 02/27/2015, 05/08/2015, 08/06/2015, 01/16/2019   • Rotavirus, Unspecified Formulation 02/27/2015, 05/08/2015   • Varicella 03/13/2016, 10/29/2024        PHYSICAL EXAM   Visit Vitals  /68 (BP Location: RUE - Right upper extremity, Patient Position: Sitting, Cuff Size: Pediatric)   Pulse 78   Temp 98.3 °F (36.8 °C) (Oral)   Ht 4' 5.74\" (1.365 m)   Wt 30.3 kg (66 lb 12.8 oz)   SpO2 97%   BMI 16.26 kg/m²        Physical Exam  Vitals reviewed. Exam conducted with a chaperone present.   Constitutional:       Appearance: Normal appearance.   HENT:      Head: Normocephalic.      Right Ear: Tympanic membrane, ear canal and external ear normal.      Left Ear: Tympanic membrane, ear canal and external ear normal.      Nose: Nose normal.      Mouth/Throat:      Mouth: Mucous membranes are moist.       Pharynx: Oropharynx is clear.      Neck: Normal range of motion and neck supple.   Eyes:      Conjunctiva/sclera: Conjunctivae normal.      Pupils: Pupils are equal, round, and reactive to light.   Cardiovascular:      Rate and Rhythm: Normal rate and regular rhythm.      Pulses: Normal pulses.      Heart sounds: Normal heart sounds. No murmur heard.     No gallop.   Pulmonary:      Effort: Pulmonary effort is normal. No respiratory distress or nasal flaring.      Breath sounds: Normal breath sounds. No decreased air movement. No wheezing.   Abdominal:      General: Bowel sounds are normal. There is no distension.      Palpations: Abdomen is soft. There is no mass.      Tenderness: There is no abdominal tenderness.      Hernia: No hernia is present.   Musculoskeletal:         General: Normal range of motion.   Skin:     General: Skin is warm and dry.      Capillary Refill: Capillary refill takes less than 2 seconds.      Findings: Rash present.             Comments: Erythematous dry flaky dermatitis on the backs of bilateral hands and no rashes anywhere else including chest legs feet head or mouth.   Neurological:      General: No focal deficit present.      Mental Status: She is alert and oriented for age.   Psychiatric:         Mood and Affect: Mood normal.         Behavior: Behavior normal.     Current Outpatient Medications   Medication Sig Dispense Refill   • triamcinolone (ARISTOCORT) 0.1 % ointment Apply 1 Application topically in the morning and 1 Application in the evening. 30 g 0     No current facility-administered medications for this visit.        ASSESSMENT AND PLAN  1. Atopic dermatitis of both hands  Patient reports that she has been having slight erythema and dry skin on the backs of both hands for the past week or so she has been moisturizing the back of her hands with weleda lotion but despite this has had persistent symptoms.  She will be traveling to [place] tomorrow and wants to make sure  things are good for her travels.  On exam it looks as if the patient has a nonspecific atopic dermatitis on the backs of both hands I suspect this is due to the cold weather and dry environment with the heat.  We discussed the use of topical steroid cream given that she will be outside she was advised to apply a strong sunscreen periodically throughout the day to prevent sunburn on the back of her hands as steroids can have a tendency to thin the skin.  She will apply the steroid cream and a light layer nightly for the next 10 days.  In the daytime she was advised that she could apply Aquaphor but not when she is going out in the sun she should use sunscreen when out in the sun.  Patient and mother voiced understanding and will look to change of soaps to ensure that soap is more hydrating.  We also discussed having the patient travel with a small hand lotion while at school to prevent skin flareups while in school.    Follow-up in 1 week if symptoms persist.

## (undated) DEVICE — 3M™ RED DOT™ MONITORING ELECTRODE WITH FOAM TAPE AND STICKY GEL, 50/BAG, 20/CASE, 72/PLT 2570: Brand: RED DOT™

## (undated) DEVICE — Device

## (undated) DEVICE — 1200CC GUARDIAN II: Brand: GUARDIAN

## (undated) DEVICE — Device: Brand: DEFENDO AIR/WATER/SUCTION AND BIOPSY VALVE

## (undated) DEVICE — ENDOSCOPY PACK UPPER: Brand: MEDLINE INDUSTRIES, INC.

## (undated) DEVICE — FILTERLINE NASAL ADULT O2/CO2

## (undated) NOTE — MR AVS SNAPSHOT
After Visit Summary   4/6/2017    Bolivar Vargas    MRN: [id number]           Diagnoses this Visit     Light chain disease, lambda type ([facility].)    -  Primary       Allergies     Pcn [Penicillins]     Lost voice    Revlimid [Lenalidomide]     Liver pro Thursday May 04, 2017 11:30 AM     Appointment with Haja Marmolejo Dr at [facility] ([phone])   [address] [facility] [address]       Wednesday June 07, 2017 1:00 PM     Appointment with Dinorah Nieto at [facility]

## (undated) NOTE — MR AVS SNAPSHOT
After Visit Summary   4/14/2017    Roro Bonilla    MRN: [id number]           Diagnoses this Visit     Light chain disease, lambda type ([facility] 75.)    -  Primary       Allergies     Pcn [Penicillins]     Lost voice    Revlimid [Lenalidomide]     Liver pr [phone]       Tuesday September 26, 2017 9:00 AM     Appointment with Tessie Blizzard at [place] [address] ([phone])   [address]

## (undated) NOTE — MR AVS SNAPSHOT
After Visit Summary   1/12/2017    Elkin Hand    MRN: [id number]           Diagnoses this Visit     Stage III multiple myeloma ([facility]    -  Primary       Allergies     Pcn [Penicillins]     Lost voice    Revlimid [Lenalidomide]     Liver problems Appointment with Joanne Bower at [facility] ([phone])   [address] [facility] [address]       Wednesday June 07, 2017 1:00 PM     Appointment with Lindsey Awad at [facility] [phone] Appforma  You can access your MyChart to more actively manage your health care and view more details from this visit by going to [email or web address].   If you've recently had a stay at the Hospital you can access your discharge instructions i

## (undated) NOTE — MR AVS SNAPSHOT
After Visit Summary   3/9/2017    Oumar Vega    MRN: [id number]           Diagnoses this Visit     Light chain disease, lambda type ([facility] 75.)    -  Primary       Allergies     Pcn [Penicillins]     Lost voice    Revlimid [Lenalidomide]     Liver pro LAB:  [id number] A/G/M, QUANT        Thursday March 09, 2017     LAB:  MONOCLONAL PROTEIN STUDY        Thursday March 09, 2017     LAB:  [facility] MORPHOLOGY SCAN        Thursday March 09, 2017     LAB:  [facility] SLIDE        Friday March 10, 2017 11:00 AM Component Value Flag Ref Range Units Status    Immunoglobulin A <7.83 (L) 70..00  mg/dL Final    Immunoglobulin G 435 (L) 791-1643  mg/dL Final    Immunoglobulin M 6.9 (L) 43.0-279.0  mg/dL Final         Result Summary for CBC WITH DIFFERENTIAL WITH RBC 3.16 (L) 4.30-5.70  x10(6)uL Final    HGB 11.4 (L) 13.0-17.0  g/dL Final    HCT 34.1 (L) 37.0-53.0  % Final    .0 (L) 150.0-450.0  10(3)uL Final    .9 (H) 80.0-99.0  fL Final    MCH 36.1 (H) 27.0-33.2  pg Final    MCHC 33.4  31.0-37.0  g Call [phone] for help. [place] is NOT to be used for urgent needs. For medical emergencies, dial 911.

## (undated) NOTE — LETTER
[address] [facility] FOR BLOOD OR BLOOD COMPONENTS      In the course of your treatment, it may become necessary to administer a transfusion of blood or blood components.  This form provides basic information concerning this proc explain the alternatives to you if it has not already been done. I,Grady Domínguez, have read/had read to me the above. I understand the matters bearing on the decision whether or not to authorize a transfusion of blood or blood components.  I have no quest

## (undated) NOTE — MR AVS SNAPSHOT
After Visit Summary   6/2/2017    Rea Gu    MRN: [id number]           Allergies     Pcn [Penicillins]     Lost voice    Revlimid [Lenalidomide]     Liver problems    Tetracycline Rash    Infection    Vancomycin       Your Vital Signs Were Appointment with Richi Adams at [facility], [address] [facility] ([phone])   [place] [phone]            MyChart     Visit MyChart  You can access your MyChart to more actively manage your health care

## (undated) NOTE — MR AVS SNAPSHOT
After Visit Summary   3/17/2017    Julissa Rosa    MRN: [id number]           Diagnoses this Visit     Light chain disease, lambda type ([facility] 75.)    -  Primary       Allergies     Pcn [Penicillins]     Lost voice    Revlimid [Lenalidomide]     Liver pr ([phone])   [address] [facility] [address] [address]       Thursday April 06, 2017 11:00 AM     Appointment with Jocelyn Morrissey at [facility] [place] ([phone])   [address]  [address]

## (undated) NOTE — MR AVS SNAPSHOT
After Visit Summary   1/12/2017    Jerson Loera    MRN: [id number]           Diagnoses this Visit     Light chain disease, lambda type ([facility] 75.)    -  Primary       Allergies     Pcn [Penicillins]     Lost voice    Revlimid [Lenalidomide]     Liver pr [phone])   [address]  [address] [facility] [address]       Wednesday June 07, 2017 1:00 PM     Appointment with Reyes Cortes at [address] [place] [address] ([phone])   [address] [place] [address], [address] [place] [address]

## (undated) NOTE — Clinical Note
01/04/2017    To whom it may concern,       This letter is being written on behalf of Suri Barbour in regards to insurance coverage for immunizations post stem cell transplant.

## (undated) NOTE — MR AVS SNAPSHOT
After Visit Summary   5/19/2017    Maryann Harden    MRN: [id number]           Diagnoses this Visit     Light chain disease, lambda type ([facility] 75.)    -  Primary       Allergies     Pcn [Penicillins]     Lost voice    Revlimid [Lenalidomide]     Liver pr office, you can view your past visit information in CUPSharChromatik by going to Visits < Visit Summaries. [employer] questions? Call [phone] for help. [employer] is NOT to be used for urgent needs. For medical emergencies, dial 911.

## (undated) NOTE — MR AVS SNAPSHOT
After Visit Summary   6/1/2017    Elkin Hand    MRN: [id number]           Diagnoses this Visit     Multiple myeloma in relapse [facility])    -  Primary     Disorder of bone         Light chain disease, lambda type ([facility]         GERD without esophagit ([phone])   [address] [place] [address]       Thursday Lolly 15, 2017 11:30 AM     Appointment with Joanne Bower at [facility] ([phone])   [address] [place] [address]       Friday Immunoglobulin G 380 (L) [phone]  mg/dL Final    Immunoglobulin M <5.3 (L) 43.0-279.0  mg/dL Final         Result Summary for MONOCLONAL PROTEIN STUDY      Component Results     Component Value Flag Ref Range Units Status    Total Protein,Serum 6.1  6.1- BUN 26 (H) 8-20  mg/dL Final    Creatinine 1.58 (H) 0.70-1.30  mg/dL Final    GFR 46 (L) >=60   Final    Comment:       Estimated GFR units: mL/min/1.73 square meters   eGFR calculated by the CKD-EPI equation.         Calcium, Total 9.0  8.3-10.3  mg/dL Fi Basophil % 0.3   % Final    Immature Granulocyte % 2.1   % Final               MyChart     Visit MyChart  You can access your MyChart to more actively manage your health care and view more details from this visit by going to [email or web address].

## (undated) NOTE — MR AVS SNAPSHOT
After Visit Summary   2/9/2017    Bebo Jean    MRN: [id number]           Diagnoses this Visit     Stage III multiple myeloma ([facility]    -  Primary     Light chain disease, lambda type ([facility]           Allergies     Pcn [Penicillins]     Lost voice Nguyen Internal:  CBC W/ DIFFERENTIAL        Friday February 10, 2017 11:00 AM     Appointment with Rebecca Bills at [address] [facility] ([phone])   [facility] [address]  [address] [facility] [address]       Wednesday June 07, 2017 1:00

## (undated) NOTE — MR AVS SNAPSHOT
After Visit Summary   2/17/2017    Elsa Burnett    MRN: [id number]           Diagnoses this Visit     Light chain disease, lambda type ([facility] 75.)    -  Primary       Allergies     Pcn [Penicillins]     Lost voice    Revlimid [Lenalidomide]     Liver pr Visit [employer]  You can access your MyChart to more actively manage your health care and view more details from this visit by going to [email or web address].   If you've recently had a stay at the Hospital you can access your discharge instructions i

## (undated) NOTE — MR AVS SNAPSHOT
After Visit Summary   2/10/2017    Prachi Rodriguez    MRN: [id number]           Diagnoses this Visit     Light chain disease, lambda type ([facility] 75.)    -  Primary       Allergies     Pcn [Penicillins]     Lost voice    Revlimid [Lenalidomide]     Liver pr [address] [place] [address]  [address] [facility] [address] [address]       Wednesday June 07, 2017 1:00 PM     Appointment with Linda [place] at [address] [place] [address] ([phone])   [address] [place] [address], [address] [place] [phone]       Tuesday September 26

## (undated) NOTE — MR AVS SNAPSHOT
After Visit Summary   6/8/2017    Kerrie Marcano    MRN: [id number]           Diagnoses this Visit     Light chain disease, lambda type ([facility] 75.)    -  Primary       Allergies     Pcn [Penicillins]     Lost voice    Revlimid [Lenalidomide]     Liver pro [phone])   [address] [place] [address]       Thursday Lolly 15, 2017 11:30 AM     Appointment with Joanne Bower at [facility] ([phone])   [address] [place] [address]       Friday Component Value Flag Ref Range Units Status    WBC 11.0  4.0-13.0  x10(3) uL Final    RBC 2.58 (L) 4.30-5.70  x10(6)uL Final    HGB 9.4 (L) 13.0-17.0  g/dL Final    HCT 27.5 (L) 37.0-53.0  % Final    PLT 62.0 (L) 150.0-450.0  10(3)uL Final    .6 (H office, you can view your past visit information in Identyx by going to Visits < Visit Summaries. [employer]x questions? Call [phone] for help. [employer] is NOT to be used for urgent needs. For medical emergencies, dial 911.

## (undated) NOTE — MR AVS SNAPSHOT
After Visit Summary   4/6/2017    Jackelin Martin    MRN: [id number]           Diagnoses this Visit     Light chain disease, lambda type ([facility].)           Allergies     Pcn [Penicillins]     Lost voice    Revlimid [Lenalidomide]     Liver problems    [address] [address]       Thursday April 13, 2017 11:00 AM     Appointment with Rebecca Bills at [facility] ([phone])   [address] [address]       Friday April 14, 2017 1 WITH PLATELET.   Procedure                               Abnormality         Status                     ---------                               -----------         ------                     CBC W/ DIFFERENTIAL[[id number]]          Abnormal            Final Neutrophil Absolute Prelim 5.22  1.30-6.70  x10 (3) uL Final    Neutrophil Absolute 5.22  1.30-6.70  x10(3) uL Final    Lymphocyte Absolute 0.89 (L) 0.90-4.00  x10(3) uL Final    Monocyte Absolute 1.06 (H) 0.10-0.60  x10(3) uL Final    Eosinophil Absolute

## (undated) NOTE — MR AVS SNAPSHOT
After Visit Summary   3/16/2017    Elvira Guthrie    MRN: [id number]           Diagnoses this Visit     Light chain disease, lambda type ([facility] 75.)    -  Primary       Allergies     Pcn [Penicillins]     Lost voice    Revlimid [Lenalidomide]     Liver pr Appointment with Jade Bush at [facility] ([phone])   [address] [place] [address]       Thursday March 23, 2017 11:00 AM     Appointment with Haja Marmolejo Dr at [facility] (630 HGB 11.2 (L) 13.0-17.0  g/dL Final    HCT 32.4 (L) 37.0-53.0  % Final    PLT 56.0 (L) 150.0-450.0  10(3)uL Final    .2 (H) 80.0-99.0  fL Final    MCH 36.7 (H) 27.0-33.2  pg Final    MCHC 34.6  31.0-37.0  g/dL Final    RDW 14.8  11.5-16.0  % Final Call [phone] for help. TextHubhart is NOT to be used for urgent needs. For medical emergencies, dial 911.

## (undated) NOTE — MR AVS SNAPSHOT
After Visit Summary   4/21/2017    Julissa Rosa    MRN: [id number]           Diagnoses this Visit     Light chain disease, lambda type ([facility] 75.)    -  Primary       Allergies     Pcn [Penicillins]     Lost voice    Revlimid [Lenalidomide]     Liver pr [email or web address]. If you've recently had a stay at the Hospital you can access your discharge instructions in [employer] by going to Visits < Admission Summaries.  If you've been to the Emergency Department or your doctor's office, you can view yo

## (undated) NOTE — MR AVS SNAPSHOT
After Visit Summary   3/24/2017    Pedro Benton    MRN: [id number]           Diagnoses this Visit     Light chain disease, lambda type ([facility] 75.)    -  Primary       Allergies     Pcn [Penicillins]     Lost voice    Revlimid [Lenalidomide]     Liver pr WITH IV FLUIDS BEFORE AND AFTER.   POSSIBLE SIDE EFFECTS INCLUDE:     LOWERED BLOOD COUNTS; FATIGUE;  NAUSEA;  DIARRHEA;  FEVER  YOU ARE TO TAKE ORAL DEXAMETHASONE 20 MG ON  [address] AND [address] WHEN YOU DO NOT  - [address] (SINCE YOU GET IV DEXAMETHAS

## (undated) NOTE — MR AVS SNAPSHOT
After Visit Summary   2/24/2017    Abdelrahman Langston    MRN: [id number]           Diagnoses this Visit     Light chain disease, lambda type ([facility] 75.)    -  Primary       Allergies     Pcn [Penicillins]     Lost voice    Revlimid [Lenalidomide]     Liver pr Summaries. If you've been to the Emergency Department or your doctor's office, you can view your past visit information in LETSGROOP by going to Visits < Visit Summaries. LETSGROOP questions? Call [phone] for help.   LETSGROOP is NOT to be used for urge

## (undated) NOTE — MR AVS SNAPSHOT
After Visit Summary   3/23/2017    Althea Becerril    MRN: [id number]           Diagnoses this Visit     Light chain disease, lambda type ([facility] 75.)    -  Primary       Allergies     Pcn [Penicillins]     Lost voice    Revlimid [Lenalidomide]     Liver pr Procedure                               Abnormality         Status                     ---------                               -----------         ------                     CBC W/ DIFFERENTIAL[[id number]]          Abnormal            Final result Call [phone] for help. Blendspacehart is NOT to be used for urgent needs. For medical emergencies, dial 911.

## (undated) NOTE — LETTER
February 23, 2018        Maryann Harden  [address] [facility] [address]      Dear Chandni Reeves:    I am the Nurse Care Manager with Dr. Lynn Esposito office. Just reaching out to see how you are doing since your discharge home.    When you have

## (undated) NOTE — MR AVS SNAPSHOT
After Visit Summary   5/5/2017    Oumar Vega    MRN: [id number]           Diagnoses this Visit     Light chain disease, lambda type ([facility] 75.)    -  Primary       Allergies     Pcn [Penicillins]     Lost voice    Revlimid [Lenalidomide]     Liver pro Appointment with [facility] [place] at [facility] MRI ([phone])   Very little preparation is required for an MRI. You can eat, drink, and take your medication(s). Prior to the scan, you are encouraged to go to the bathroom.   IF YOU REQUIRE ORAL SEDATI would fill at your local pharmacy. Please follow your doctor's instructions when taking oral sedation.   If you will be taking oral sedation, you must bring a  who will drive you home (the  does not necessarily have to stay throughout the proced extremely important to present documentation of the implant information at time of your appointment.   FOR FEMALES HAVING GADOLINIUM EXAMS :If you are breastfeeding and your exam requires an IV Contrast (Gadolinium) injection, you need to stop breastfeeding You can access your MyChart to more actively manage your health care and view more details from this visit by going to [email or web address].   If you've recently had a stay at the Hospital you can access your discharge instructions in [address] by angelique

## (undated) NOTE — MR AVS SNAPSHOT
After Visit Summary   2/16/2017    Ruth     MRN: [id number]           Diagnoses this Visit     Light chain disease, lambda type ([facility] 75.)    -  Primary       Allergies     Pcn [Penicillins]     Lost voice    Revlimid [Lenalidomide]     Liver pr Appointment with [address] at [facility] ([phone])   [address] [facility] [place] [address]       Wednesday June 07, 2017 1:00 PM     Appointment with Andriy Whitfield at [facility] ([phone] Basophil Absolute Manual 0.00  0.00-0.10  x10(3) uL Final    Metamyelocyte Absolute Manual 0.17 (H) <0.01  x10(3) uL Final    Myelocyte Absolute Manual 0.17 (H) <0.01  x10(3) uL Final    Neutrophils % Manual 66   % Final    Band % 4   % Final    Lymphocyt

## (undated) NOTE — MR AVS SNAPSHOT
After Visit Summary   5/12/2017    Kings Guthrie    MRN: [id number]           Diagnoses this Visit     Light chain disease, lambda type ([facility] 75.)    -  Primary       Allergies     Pcn [Penicillins]     Lost voice    Revlimid [Lenalidomide]     Liver pr discharge instructions in "[employer]hart by going to Visits < Admission Summaries. If you've been to the Emergency Department or your doctor's office, you can view your past visit information in "[employer]hart by going to Visits < Visit Summaries. ZeePearl questions?

## (undated) NOTE — MR AVS SNAPSHOT
After Visit Summary   5/11/2017    Bib Harveyfoster    MRN: [id number]           Diagnoses this Visit     Light chain disease, lambda type ([facility] 75.)    -  Primary       Allergies     Pcn [Penicillins]     Lost voice    Revlimid [Lenalidomide]     Liver pr breastfeeding and your exam requires an IV Contrast (Gadolinium) injection, you need to stop breastfeeding for 24-48 hours after the procedure.      IF A CHILD is scheduled for this procedure, parents should be advised that they will not be able to accompan Friday May 12, 2017 12:00 PM     Appointment with [address] at [facility] MRI ([phone])   Very little preparation is required for an MRI. You can eat, drink, and take your medication(s).   Prior to the scan, you are encouraged to go to the bathr [address] [address]       Thursday June 01, 2017 11:00 AM     Appointment with Jennifer Ortiz at [facility] [place] ([phone])   [address] [address]       Friday June 02, 2 Lymphocyte Absolute 1.10  0.90-4.00  x10(3) uL Final    Monocyte Absolute 1.14 (H) 0.10-0.60  x10(3) uL Final    Eosinophil Absolute 0.29  0.00-0.30  x10(3) uL Final    Basophil Absolute 0.06  0.00-0.10  x10(3) uL Final    Immature Granulocyte Absolute 0.

## (undated) NOTE — MR AVS SNAPSHOT
[address] [facility] [address] [address] 7170 1155                    After Visit Summary   1/27/2017    Roro Bonilla    MRN: [id number]           Visit Information        Provider Department Dept Phone 9/26/2017 9:00 AM Lio [address] [place] [address], Nahid Kwon       Future Appointments Provider Department Dept Phone    2/10/2017 11:00 AM [address] [facility] [phone]    6/7/2017 1:00 PM Aniyah Ford

## (undated) NOTE — MR AVS SNAPSHOT
After Visit Summary   6/15/2017    Wilbert Crespo    MRN: [id number]           Diagnoses this Visit     Light chain disease, lambda type ([facility] 75.)    -  Primary       Allergies     Pcn [Penicillins]     Lost voice    Revlimid [Lenalidomide]     Liver pr [address] [id number]       Thursday June 29, 2017 11:15 AM     Appointment with Dev Hamilton at [facility] [place] ([phone])   [address]  [place] [address]       Thursday June 29, 2017 11:30 AM     Appointm Neutrophil Absolute Prelim 4.26  1.30-6.70  x10 (3) uL Final    Neutrophil Absolute 4.26  1.30-6.70  x10(3) uL Final    Lymphocyte Absolute 0.91  0.90-4.00  x10(3) uL Final    Monocyte Absolute 0.91 (H) 0.10-0.60  x10(3) uL Final    Eosinophil Absolute 0.

## (undated) NOTE — MR AVS SNAPSHOT
After Visit Summary   4/7/2017    Danny Guthrie    MRN: [id number]           Diagnoses this Visit     Light chain disease, lambda type ([email or web address] 75[facility]    -  Primary       Allergies     Pcn [Penicillins]     Lost voice    Revlimid [Lenalidomide]     Liver pro WITH IV FLUIDS BEFORE AND AFTER.   POSSIBLE SIDE EFFECTS INCLUDE:     LOWERED BLOOD COUNTS; FATIGUE;  NAUSEA;  DIARRHEA;  FEVER  YOU ARE TO TAKE ORAL DEXAMETHASONE 20 MG ON  [address] AND [address] WHEN YOU DO NOT  - [address] (SINCE YOU GET IV DEXAMETHAS Appointment with Yesy Alvares at [address] [place] [address] ([phone])   [address] [facility], [address] [place] [phone]       Tuesday September 26, 2017 9:00 AM     Appointment with Zev Warren at [address] [address]

## (undated) NOTE — MR AVS SNAPSHOT
After Visit Summary   1/20/2017    Kerrie Marcano    MRN: [id number]           Diagnoses this Visit     Light chain disease, lambda type ([facility] 75.)    -  Primary       Allergies     Pcn [Penicillins]     Lost voice    Revlimid [Lenalidomide]     Liver pr Visit [employer]  You can access your MyChart to more actively manage your health care and view more details from this visit by going to [email or web address].   If you've recently had a stay at the Hospital you can access your discharge instructions i

## (undated) NOTE — MR AVS SNAPSHOT
After Visit Summary   6/9/2017    Oumar Vega    MRN: [id number]           Diagnoses this Visit     Chronic viral hepatitis B without delta agent and without coma ([facility] 75.)    -  Primary     Light chain disease, lambda type ([facility] 75.)           Allergies Appointment with Roberto  at [address] [place] [address] ([phone])   [address] [place] [address], [place] [phone]                     Result Summary for HEPATITIS B DNA,QUANTITATIVE,RTPCR      MyChart     Visit MyChart  You can

## (undated) NOTE — MR AVS SNAPSHOT
After Visit Summary   4/13/2017    Ruth     MRN: [id number]           Diagnoses this Visit     Light chain disease, lambda type ([facility] 75.)    -  Primary       Allergies     Pcn [Penicillins]     Lost voice    Revlimid [Lenalidomide]     Liver pr Appointment with Rebecca Bills at [facility] ([phone])   [address] [place] [address]       Thursday April 20, 2017 11:00 AM     Appointment with Haaj Marmolejo Dr at [facility] ([phone] CBC W/ DIFFERENTIAL[[id number]]          Abnormal            Final result                 Please view results for these tests on the individual orders.          Result Summary for CBC W/ DIFFERENTIAL      Component Results     Component Value Flag Ref Range Call [phone] for help. "[employer]t is NOT to be used for urgent needs. For medical emergencies, dial 911.

## (undated) NOTE — MR AVS SNAPSHOT
After Visit Summary   3/9/2017    Jono Hidalgo    MRN: [id number]           Diagnoses this Visit     Light chain disease, lambda type ([facility] 75.)    -  Primary       Allergies     Pcn [Penicillins]     Lost voice    Revlimid [Lenalidomide]     Liver pro [address] [facility], [address] [place] [phone]       Tuesday September 26, 2017 9:00 AM     Appointment with Idalmis De La Torre at [facility] 26, [facility] ([phone])   [address] [place] [phone]

## (undated) NOTE — MR AVS SNAPSHOT
After Visit Summary   5/4/2017    Reina Hollingsworth    MRN: [id number]           Allergies     Pcn [Penicillins]     Lost voice    Revlimid [Lenalidomide]     Liver problems    Tetracycline Rash    Infection    Vancomycin       Your Vital Signs Were Appointment with [facility] MR [place] at [facility] MRI ([phone])   Very little preparation is required for an MRI. You can eat, drink, and take your medication(s). Prior to the scan, you are encouraged to go to the bathroom.   IF YOU REQUIRE ORAL SEDATI would fill at your local pharmacy. Please follow your doctor's instructions when taking oral sedation.   If you will be taking oral sedation, you must bring a  who will drive you home (the  does not necessarily have to stay throughout the proced extremely important to present documentation of the implant information at time of your appointment.   FOR FEMALES HAVING GADOLINIUM EXAMS :If you are breastfeeding and your exam requires an IV Contrast (Gadolinium) injection, you need to stop breastfeeding Total Calciums are not corrected for effects of low albumin. If needed, use the following correction formula. Corrected Calcium Formula:      ((4.0 - Albumin) x 0.8 + Calcium    Note: Calculation is only valid when Albumin is less than 4.0g/dL.       A If you've recently had a stay at the Hospital you can access your discharge instructions in Jazzdesk by going to Visits < Admission Summaries.  If you've been to the Emergency Department or your doctor's office, you can view your past visit information in My

## (undated) NOTE — LETTER
[facility] Testing Department  Phone: [phone]  Right Fax: [phone]  [facility] 20 By:  Linda Gold RN Date: 1/26/18    [facility]  Surgery Date: 1/29/2018    CSN: [id number]  Medical Record: [id number]   DO

## (undated) NOTE — MR AVS SNAPSHOT
After Visit Summary   1/26/2017    Oumar Vega    MRN: [id number]           Diagnoses this Visit     Light chain disease, lambda type ([facility] 75.)    -  Primary       Allergies     Pcn [Penicillins]     Lost voice    Revlimid [Lenalidomide]     Liver pr [place] [phone]            Result Summary for CBC WITH DIFFERENTIAL WITH PLATELET      Narrative     The following orders were created for panel order CBC WITH DIFFERENTIAL WITH PLATELET.   Procedure discharge instructions in JobSpicehart by going to Visits < Admission Summaries. If you've been to the Emergency Department or your doctor's office, you can view your past visit information in JobSpicehart by going to Visits < Visit Summaries. [employer]co questions?

## (undated) NOTE — MR AVS SNAPSHOT
After Visit Summary   2/23/2017    Joe Xiao    MRN: [id number]           Diagnoses this Visit     Light chain disease, lambda type ([facility] 75.)    -  Primary     Stage III multiple myeloma ([facility] 75.)           Allergies     Pcn [Penicillins]     Lost voic Appointment with Adriana Vidal at [facility] in [place] ([phone])   [address]  [facility] [address] [facility] [address]       Thursday March 09, 2017 11:30 AM     Appointment with Joanne Bower at [facility] CO2 21.0 (L) 22.0-32.0  mmol/L Final         Result Summary for CBC WITH DIFFERENTIAL WITH PLATELET      Narrative     The following orders were created for panel order CBC WITH DIFFERENTIAL WITH PLATELET.   Procedure                               Abnormal Summaries. If you've been to the Emergency Department or your doctor's office, you can view your past visit information in Reds10 by going to Visits < Visit Summaries. Reds10 questions? Call [phone] for help.   Reds10 is NOT to be used for urge

## (undated) NOTE — MR AVS SNAPSHOT
After Visit Summary   1/13/2017    Wilbert Crespo    MRN: [id number]           Diagnoses this Visit     Light chain disease, lambda type ([facility] 75.)    -  Primary       Allergies     Pcn [Penicillins]     Lost voice    Revlimid [Lenalidomide]     Liver pr [address] [facility] [address]       Wednesday June 07, 2017 1:00 PM     Appointment with Molina Tomlinson at [address] [place] [address] [phone])   [address] [place] [address], [address] [place] [phone]            [facility]     Visit [place]  You can access y

## (undated) NOTE — MR AVS SNAPSHOT
After Visit Summary   1/27/2017    Nury Vidal    MRN: [id number]           Diagnoses this Visit     Light chain disease, lambda type ([facility] 75.)    -  Primary       Allergies     Pcn [Penicillins]     Lost voice    Revlimid [Lenalidomide]     Liver pr office, you can view your past visit information in [employer] by going to Visits < Visit Summaries. [employer] questions? Call [phone] for help. [employer] is NOT to be used for urgent needs. For medical emergencies, dial 911.

## (undated) NOTE — LETTER
Printed: 2017    Patient Name: Elkin Hand  : 3/6/1955   Medical Record #: [id number]    Consent to Chemotherapy    I, Elkin Hand, understand that I have been diagnosed with multiple myeloma.     I understand that the treatment suggested by Patient Signature                                                            Date      For patients requiring translation or verbal reading of this document, the person reading/translating should document and sign below:    [place]/ Signature

## (undated) NOTE — MR AVS SNAPSHOT
[facility] [address], [address] [place] [address]                    After Visit Summary   6/7/2017    Kate Combs    MRN: [id number]           Visit Information        Provider Department Dept Phone    6/7/201 [[id number]]  -  Primary       Future Appointments        Provider Department    6/8/2017 10:30  Emily Jean    6/9/2017 11:30 AM [address]    6/15/2017 11:30  Simon Ruiz If you've recently had a stay at the Hospital you can access your discharge instructions in [employer] by going to Visits < Admission Summaries.  If you've been to the Emergency Department or your doctor's office, you can view your past visit information in My

## (undated) NOTE — MR AVS SNAPSHOT
After Visit Summary   5/18/2017    Danny Guthrie    MRN: [id number]           Diagnoses this Visit     Light chain disease, lambda type ([facility] 75.)    -  Primary       Allergies     Pcn [Penicillins]     Lost voice    Revlimid [Lenalidomide]     Liver pr Result Summary for CBC WITH DIFFERENTIAL WITH PLATELET      Narrative     The following orders were created for panel order CBC WITH DIFFERENTIAL WITH PLATELET.   Procedure                               Abnormality         Status                     - Summaries. If you've been to the Emergency Department or your doctor's office, you can view your past visit information in [employer] by going to Visits < Visit Summaries. [employer] questions? Call [phone] for help.   [employer] is NOT to be used for urge

## (undated) NOTE — MR AVS SNAPSHOT
After Visit Summary   5/4/2017    Joe Xiao    MRN: [id number]           Diagnoses this Visit     Light chain disease, lambda type ([facility] 75.)    -  Primary       Allergies     Pcn [Penicillins]     Lost voice    Revlimid [Lenalidomide]     Liver pro present documentation of the implant information at time of your appointment.   FOR FEMALES HAVING GADOLINIUM EXAMS :If you are breastfeeding and your exam requires an IV Contrast (Gadolinium) injection, you need to stop breastfeeding for 24-48 hours after with the child upon completion of the MRI. [place] [address] [place] [address]       Friday May 12, 2017 12:00 PM     Appointment with [facility] MR [place] at [facility] MRI ([phone])   Very little preparation is required for an MRI.   You can ea Friday May 19, 2017 11:00 AM     Appointment with Rebecca Bills at [facility] ([phone])   [facility] . [address] [place] [address]       Thursday June 01, 2017 11:00 AM     Appointment with Reynold Escamilla at [facility]

## (undated) NOTE — MR AVS SNAPSHOT
After Visit Summary   4/20/2017    Elvira Guthrie    MRN: [id number]           Diagnoses this Visit     Light chain disease, lambda type ([facility] 75.)    -  Primary       Allergies     Pcn [Penicillins]     Lost voice    Revlimid [Lenalidomide]     Liver pr [address] [facility], [address] [place] [phone]       Tuesday September 26, 2017 9:00 AM     Appointment with Zuhair Rodriguez at [facility] 26, [facility] ([phone])   [address] [place] [phone] Call [phone] for help. [employer] is NOT to be used for urgent needs. For medical emergencies, dial 911.

## (undated) NOTE — IP AVS SNAPSHOT
Patient Demographics     Address  [place] Vasquez  [address] Phone  [phone] (Home) *Preferred*  [phone] [facility]) E-mail Address  [email or web address]      Emergency Contact(s)     Name Relation Home Work Mobile    Jo Domínguez [id number] Venlafaxine HCl [facility]) tab 37.5 mg 02/07/18 2044 Given      [id number] Venlafaxine HCl [facility]) tab 37.5 mg 02/08/18 0827 Given                    Recent Vital Signs    Flowsheet Row Most Recent Value   Vitals  111/62 Filed at 02/08/2018 1210 A comprehensive 14 point review of systems was completed. Pertinent positives and negatives noted in the HPI.   Past Medical History:  Past Medical History:   Diagnosis Date   • Anemia    • Back problem     H/O compression fracture   • Bone marrow replac Comment:Infection  Vancomycin              Fever, Other (See Comments)    Comment:Generalized slough skin. Medications:    No current facility-administered medications on file prior to encounter.    Current Outpatient Prescriptions on File Prior to [facility] daily. Disp:  Rfl:    ascorbic acid (VITAMIN C) 1000 MG Oral Tab Take 1,000 mg by mouth 2 (two) times daily. Disp:  Rfl:    Lenalidomide (REVLIMID) 5 MG Oral Cap Take 1 tablet by mouth See Admin Instructions.  On 21 days, off 7 days Disp: 21 capsule Rfl: No results for input(s): TROP, CK in the last 72 hours. Imaging: Imaging data reviewed in Epic. ASSESSMENT / PLAN:   1. FTT/malnutrition[MD.1]- dietary consult[MD.3]   2. Hypotension[MD.1]-IV fluids and monitor[MD.2]  3.  Lactic acidosis- suspect 2/2 JENNIE Bleeding at G tube site    History of Present Illness:  Julissa Rsoa is a a(n) 58year old male with underlying multiple myeloma poor response to therapy including stem cell transplant and currently on palliative CT, chronic thrombocytopenia, h/o HBV gregory Problem Relation Age of Onset   • Cancer Father    • Heart Disorder Mother    • Diabetes Mother       reports that he quit smoking about 25 years ago. He has a 10.00 pack-year smoking history.  He has never used smokeless tobacco. He reports that he does no Neurologic: Normal cranial nerves and sensation  Laboratory Data:    Lab Results  Component Value Date   WBC 5.4 02/03/2018   HGB 6.8 02/03/2018   HCT 20.6 02/03/2018   PLT 52.0 02/03/2018   CREATSERUM 4.06 02/03/2018   BUN 52 02/03/2018    02/03/201 Attribution Vital    GP. 1 - [place] MD on 2/3/2018  2:56 PM                     D/C Summary     No notes of this type exist for this encounter.          Physical Therapy Notes (last 72 hours) (Notes from 2/5/2018  3:47 PM through 2/8/2018  3:47 PM Pneumovax 23 03/18/15     TDAP 11/18/16     TDAP 02/17/16     TDAP 12/14/15     TDAP 04/20/13       Future Appointments        Provider Eric Jansen    3/1/2018 3:00 PM MD [address] Nephrology [facility]    6/18/2018 1:15 P

## (undated) NOTE — MR AVS SNAPSHOT
After Visit Summary   3/10/2017    Sole Moran    MRN: [id number]           Diagnoses this Visit     Light chain disease, lambda type ([facility] 75.)    -  Primary       Allergies     Pcn [Penicillins]     Lost voice    Revlimid [Lenalidomide]     Liver pr [place] [phone]       Tuesday September 26, 2017 9:00 AM     Appointment with Fernanda Willis at [place] [address] ([phone])   [address]

## (undated) NOTE — MR AVS SNAPSHOT
After Visit Summary   1/19/2017    Oumar Vega    MRN: [id number]           Diagnoses this Visit     Light chain disease, lambda type ([facility] 75.)    -  Primary       Allergies     Pcn [Penicillins]     Lost voice    Revlimid [Lenalidomide]     Liver pr Friday February 10, 2017 11:00 AM     Appointment with Rebecca Bills at [facility] ([phone])   [address] [facility] [address] [place] [address]       Wednesday June 07, 2017 1:00 PM     Appointment with Oneil Counter at [facility] NO  If you've recently had a stay at the Hospital you can access your discharge instructions in [employer] by going to Visits < Admission Summaries.  If you've been to the Emergency Department or your doctor's office, you can view your past visit information in My